# Patient Record
Sex: MALE | Race: WHITE | HISPANIC OR LATINO
[De-identification: names, ages, dates, MRNs, and addresses within clinical notes are randomized per-mention and may not be internally consistent; named-entity substitution may affect disease eponyms.]

---

## 2017-03-10 ENCOUNTER — APPOINTMENT (OUTPATIENT)
Dept: OPHTHALMOLOGY | Facility: CLINIC | Age: 72
End: 2017-03-10

## 2017-03-10 DIAGNOSIS — Z78.9 OTHER SPECIFIED HEALTH STATUS: ICD-10-CM

## 2017-03-10 DIAGNOSIS — H26.493 OTHER SECONDARY CATARACT, BILATERAL: ICD-10-CM

## 2017-11-14 ENCOUNTER — APPOINTMENT (OUTPATIENT)
Dept: UROLOGY | Facility: CLINIC | Age: 72
End: 2017-11-14
Payer: MEDICARE

## 2017-11-14 VITALS
HEART RATE: 88 BPM | SYSTOLIC BLOOD PRESSURE: 147 MMHG | TEMPERATURE: 98.6 F | BODY MASS INDEX: 30.53 KG/M2 | RESPIRATION RATE: 17 BRPM | HEIGHT: 66 IN | WEIGHT: 190 LBS | DIASTOLIC BLOOD PRESSURE: 79 MMHG

## 2017-11-14 PROCEDURE — 99213 OFFICE O/P EST LOW 20 MIN: CPT

## 2017-11-14 PROCEDURE — 51798 US URINE CAPACITY MEASURE: CPT

## 2017-11-14 RX ORDER — CEPHALEXIN 500 MG/1
500 CAPSULE ORAL
Qty: 30 | Refills: 0 | Status: DISCONTINUED | COMMUNITY
Start: 2016-11-15

## 2017-11-14 RX ORDER — AMOXICILLIN 500 MG/1
500 TABLET, FILM COATED ORAL
Qty: 30 | Refills: 0 | Status: DISCONTINUED | COMMUNITY
Start: 2017-07-22

## 2017-11-16 LAB — PSA SERPL-MCNC: 9.05 NG/ML

## 2017-11-20 ENCOUNTER — MESSAGE (OUTPATIENT)
Age: 72
End: 2017-11-20

## 2017-11-21 ENCOUNTER — FORM ENCOUNTER (OUTPATIENT)
Age: 72
End: 2017-11-21

## 2017-11-22 ENCOUNTER — APPOINTMENT (OUTPATIENT)
Dept: MRI IMAGING | Facility: IMAGING CENTER | Age: 72
End: 2017-11-22
Payer: MEDICARE

## 2017-11-22 ENCOUNTER — OUTPATIENT (OUTPATIENT)
Dept: OUTPATIENT SERVICES | Facility: HOSPITAL | Age: 72
LOS: 1 days | End: 2017-11-22
Payer: COMMERCIAL

## 2017-11-22 DIAGNOSIS — R97.20 ELEVATED PROSTATE SPECIFIC ANTIGEN [PSA]: ICD-10-CM

## 2017-11-22 PROCEDURE — 82565 ASSAY OF CREATININE: CPT

## 2017-11-22 PROCEDURE — 72197 MRI PELVIS W/O & W/DYE: CPT

## 2017-11-22 PROCEDURE — A9585: CPT

## 2017-11-22 PROCEDURE — 72197 MRI PELVIS W/O & W/DYE: CPT | Mod: 26

## 2018-11-13 ENCOUNTER — APPOINTMENT (OUTPATIENT)
Dept: UROLOGY | Facility: CLINIC | Age: 73
End: 2018-11-13
Payer: MEDICARE

## 2018-11-13 VITALS
TEMPERATURE: 98.4 F | HEART RATE: 91 BPM | HEIGHT: 66 IN | SYSTOLIC BLOOD PRESSURE: 106 MMHG | BODY MASS INDEX: 30.53 KG/M2 | WEIGHT: 190 LBS | RESPIRATION RATE: 17 BRPM | DIASTOLIC BLOOD PRESSURE: 65 MMHG

## 2018-11-13 PROCEDURE — 99214 OFFICE O/P EST MOD 30 MIN: CPT

## 2018-11-14 LAB — PSA SERPL-MCNC: 10.83 NG/ML

## 2018-12-10 ENCOUNTER — FORM ENCOUNTER (OUTPATIENT)
Age: 73
End: 2018-12-10

## 2018-12-11 ENCOUNTER — OUTPATIENT (OUTPATIENT)
Dept: OUTPATIENT SERVICES | Facility: HOSPITAL | Age: 73
LOS: 1 days | End: 2018-12-11
Payer: COMMERCIAL

## 2018-12-11 ENCOUNTER — APPOINTMENT (OUTPATIENT)
Dept: UROLOGY | Facility: CLINIC | Age: 73
End: 2018-12-11
Payer: MEDICARE

## 2018-12-11 ENCOUNTER — APPOINTMENT (OUTPATIENT)
Dept: ULTRASOUND IMAGING | Facility: IMAGING CENTER | Age: 73
End: 2018-12-11
Payer: MEDICARE

## 2018-12-11 DIAGNOSIS — Z87.442 PERSONAL HISTORY OF URINARY CALCULI: ICD-10-CM

## 2018-12-11 DIAGNOSIS — Z00.8 ENCOUNTER FOR OTHER GENERAL EXAMINATION: ICD-10-CM

## 2018-12-11 PROCEDURE — 76770 US EXAM ABDO BACK WALL COMP: CPT | Mod: 26

## 2018-12-11 PROCEDURE — 99212 OFFICE O/P EST SF 10 MIN: CPT

## 2018-12-11 PROCEDURE — 76770 US EXAM ABDO BACK WALL COMP: CPT

## 2018-12-13 LAB — BACTERIA UR CULT: NORMAL

## 2019-01-08 ENCOUNTER — MEDICATION RENEWAL (OUTPATIENT)
Age: 74
End: 2019-01-08

## 2019-01-14 ENCOUNTER — APPOINTMENT (OUTPATIENT)
Dept: UROLOGY | Facility: HOSPITAL | Age: 74
End: 2019-01-14

## 2019-02-01 ENCOUNTER — OUTPATIENT (OUTPATIENT)
Dept: OUTPATIENT SERVICES | Facility: HOSPITAL | Age: 74
LOS: 1 days | End: 2019-02-01
Payer: COMMERCIAL

## 2019-02-01 ENCOUNTER — APPOINTMENT (OUTPATIENT)
Dept: MRI IMAGING | Facility: IMAGING CENTER | Age: 74
End: 2019-02-01
Payer: MEDICARE

## 2019-02-01 DIAGNOSIS — Z00.8 ENCOUNTER FOR OTHER GENERAL EXAMINATION: ICD-10-CM

## 2019-02-01 PROCEDURE — 72197 MRI PELVIS W/O & W/DYE: CPT | Mod: 26

## 2019-02-01 PROCEDURE — A9585: CPT

## 2019-02-01 PROCEDURE — 72197 MRI PELVIS W/O & W/DYE: CPT

## 2019-08-11 ENCOUNTER — EMERGENCY (EMERGENCY)
Facility: HOSPITAL | Age: 74
LOS: 1 days | Discharge: HOME | End: 2019-08-11
Attending: EMERGENCY MEDICINE
Payer: COMMERCIAL

## 2019-08-11 VITALS
DIASTOLIC BLOOD PRESSURE: 89 MMHG | OXYGEN SATURATION: 97 % | HEART RATE: 89 BPM | RESPIRATION RATE: 16 BRPM | SYSTOLIC BLOOD PRESSURE: 147 MMHG

## 2019-08-11 VITALS
RESPIRATION RATE: 17 BRPM | HEART RATE: 93 BPM | OXYGEN SATURATION: 97 % | SYSTOLIC BLOOD PRESSURE: 144 MMHG | TEMPERATURE: 98 F | DIASTOLIC BLOOD PRESSURE: 92 MMHG

## 2019-08-11 LAB
ALBUMIN SERPL ELPH-MCNC: 4 G/DL — SIGNIFICANT CHANGE UP (ref 3.3–5)
ALP SERPL-CCNC: 69 U/L — SIGNIFICANT CHANGE UP (ref 40–120)
ALT FLD-CCNC: 47 U/L — HIGH (ref 10–45)
ANION GAP SERPL CALC-SCNC: 11 MMOL/L — SIGNIFICANT CHANGE UP (ref 5–17)
APTT BLD: 30.9 SEC — SIGNIFICANT CHANGE UP (ref 27.5–36.3)
AST SERPL-CCNC: 42 U/L — HIGH (ref 10–40)
BASOPHILS # BLD AUTO: 0 K/UL — SIGNIFICANT CHANGE UP (ref 0–0.2)
BASOPHILS NFR BLD AUTO: 0.2 % — SIGNIFICANT CHANGE UP (ref 0–2)
BILIRUB SERPL-MCNC: 0.3 MG/DL — SIGNIFICANT CHANGE UP (ref 0.2–1.2)
BUN SERPL-MCNC: 14 MG/DL — SIGNIFICANT CHANGE UP (ref 7–23)
CALCIUM SERPL-MCNC: 9.1 MG/DL — SIGNIFICANT CHANGE UP (ref 8.4–10.5)
CHLORIDE SERPL-SCNC: 106 MMOL/L — SIGNIFICANT CHANGE UP (ref 96–108)
CO2 SERPL-SCNC: 23 MMOL/L — SIGNIFICANT CHANGE UP (ref 22–31)
CREAT SERPL-MCNC: 0.87 MG/DL — SIGNIFICANT CHANGE UP (ref 0.5–1.3)
EOSINOPHIL # BLD AUTO: 0.4 K/UL — SIGNIFICANT CHANGE UP (ref 0–0.5)
EOSINOPHIL NFR BLD AUTO: 6.2 % — HIGH (ref 0–6)
GLUCOSE SERPL-MCNC: 127 MG/DL — HIGH (ref 70–99)
HCT VFR BLD CALC: 46.9 % — SIGNIFICANT CHANGE UP (ref 39–50)
HGB BLD-MCNC: 14.7 G/DL — SIGNIFICANT CHANGE UP (ref 13–17)
INR BLD: 0.95 RATIO — SIGNIFICANT CHANGE UP (ref 0.88–1.16)
LYMPHOCYTES # BLD AUTO: 1.6 K/UL — SIGNIFICANT CHANGE UP (ref 1–3.3)
LYMPHOCYTES # BLD AUTO: 25.2 % — SIGNIFICANT CHANGE UP (ref 13–44)
MCHC RBC-ENTMCNC: 27.1 PG — SIGNIFICANT CHANGE UP (ref 27–34)
MCHC RBC-ENTMCNC: 31.4 GM/DL — LOW (ref 32–36)
MCV RBC AUTO: 86.4 FL — SIGNIFICANT CHANGE UP (ref 80–100)
MONOCYTES # BLD AUTO: 0.7 K/UL — SIGNIFICANT CHANGE UP (ref 0–0.9)
MONOCYTES NFR BLD AUTO: 10.4 % — SIGNIFICANT CHANGE UP (ref 2–14)
NEUTROPHILS # BLD AUTO: 3.7 K/UL — SIGNIFICANT CHANGE UP (ref 1.8–7.4)
NEUTROPHILS NFR BLD AUTO: 58 % — SIGNIFICANT CHANGE UP (ref 43–77)
PLATELET # BLD AUTO: 297 K/UL — SIGNIFICANT CHANGE UP (ref 150–400)
POTASSIUM SERPL-MCNC: 3.9 MMOL/L — SIGNIFICANT CHANGE UP (ref 3.5–5.3)
POTASSIUM SERPL-SCNC: 3.9 MMOL/L — SIGNIFICANT CHANGE UP (ref 3.5–5.3)
PROT SERPL-MCNC: 7 G/DL — SIGNIFICANT CHANGE UP (ref 6–8.3)
PROTHROM AB SERPL-ACNC: 10.9 SEC — SIGNIFICANT CHANGE UP (ref 10–12.9)
RBC # BLD: 5.43 M/UL — SIGNIFICANT CHANGE UP (ref 4.2–5.8)
RBC # FLD: 13 % — SIGNIFICANT CHANGE UP (ref 10.3–14.5)
SODIUM SERPL-SCNC: 140 MMOL/L — SIGNIFICANT CHANGE UP (ref 135–145)
WBC # BLD: 6.4 K/UL — SIGNIFICANT CHANGE UP (ref 3.8–10.5)
WBC # FLD AUTO: 6.4 K/UL — SIGNIFICANT CHANGE UP (ref 3.8–10.5)

## 2019-08-11 PROCEDURE — 70498 CT ANGIOGRAPHY NECK: CPT | Mod: 26

## 2019-08-11 PROCEDURE — 93005 ELECTROCARDIOGRAM TRACING: CPT

## 2019-08-11 PROCEDURE — 82962 GLUCOSE BLOOD TEST: CPT

## 2019-08-11 PROCEDURE — 70496 CT ANGIOGRAPHY HEAD: CPT

## 2019-08-11 PROCEDURE — 85027 COMPLETE CBC AUTOMATED: CPT

## 2019-08-11 PROCEDURE — 70498 CT ANGIOGRAPHY NECK: CPT

## 2019-08-11 PROCEDURE — 80053 COMPREHEN METABOLIC PANEL: CPT

## 2019-08-11 PROCEDURE — 70496 CT ANGIOGRAPHY HEAD: CPT | Mod: 26

## 2019-08-11 PROCEDURE — 99291 CRITICAL CARE FIRST HOUR: CPT

## 2019-08-11 PROCEDURE — 70450 CT HEAD/BRAIN W/O DYE: CPT | Mod: 26,59

## 2019-08-11 PROCEDURE — 85730 THROMBOPLASTIN TIME PARTIAL: CPT

## 2019-08-11 PROCEDURE — 70450 CT HEAD/BRAIN W/O DYE: CPT

## 2019-08-11 PROCEDURE — 93010 ELECTROCARDIOGRAM REPORT: CPT

## 2019-08-11 PROCEDURE — 85610 PROTHROMBIN TIME: CPT

## 2019-08-11 PROCEDURE — 99291 CRITICAL CARE FIRST HOUR: CPT | Mod: 25

## 2019-08-11 RX ORDER — ASPIRIN/CALCIUM CARB/MAGNESIUM 324 MG
81 TABLET ORAL DAILY
Refills: 0 | Status: DISCONTINUED | OUTPATIENT
Start: 2019-08-11 | End: 2019-08-15

## 2019-08-11 NOTE — ED ADULT TRIAGE NOTE - CHIEF COMPLAINT QUOTE
Patient c/o left sided numbness since 3 am, denies weakness, accompanied with nausea. Wife gave him 2 baby aspirin. No medical history, no medications, only vitamins.

## 2019-08-11 NOTE — ED ADULT NURSE NOTE - OBJECTIVE STATEMENT
74 year old male presents to the ED via walk in with c/o numbness. Pt awoke at 3am to use the restroom and felt pins and needles in left leg, left cheek, and left arm. Last known normal 12am when he went to bed. 74 year old male presents to the ED via walk in with c/o numbness. Pt awoke at 3am to use the restroom and felt pins and needles in left leg, left cheek, and left arm. Last known normal 12am when he went to bed. Code stroke called at 0437. Pt ambulated with steady gait with no assist. Equal sensation on all extremities. Normal speech, symmetrical face. Pt endorses all feelings have resolved at this time other than tingling in left fingers. Pt has no other PMH aside from prostate. Denies c/o pain or discomfort at this time. Comfort and safety measures maintained.

## 2019-08-11 NOTE — ED PROVIDER NOTE - PHYSICAL EXAMINATION
Physical Exam:  Gen: NAD, AOx3, non-toxic appearing, able to ambulate without assistance  Head: NCAT  HEENT: EOMI, PEERLA, normal conjunctiva, tongue midline, oral mucosa moist  Lung: CTAB, no respiratory distress, no wheezes/rhonchi/rales B/L, speaking in full sentences  CV: RRR, no murmurs, rubs or gallops  Abd: soft, NT, ND, no guarding, no rigidity, no rebound tenderness, no CVA tenderness   MSK: no visible deformities, ROM normal in UE/LE, no back pain  Neuro: No focal sensory or motor deficits, subjective numbness in left perioral area, left fingertips, left outerthigh  Skin: Warm, well perfused, no rash, no leg swelling  Psych: normal affect, calm  ~Curly Catherine D.O. -Resident

## 2019-08-11 NOTE — ED PROVIDER NOTE - ATTENDING CONTRIBUTION TO CARE
74M, pmh bph, presents with subjective numbness to L face, arms, torso, leg, onset ~1.5 hours PTA. pt states he awoke to urinate when noted sx. last noted normal ~4.5 hours pta when went to sleep. denies weakness, vision changes, speech changes, confusion, headache, dizziness. Denies cp, sob. Pt's sx improving with time, only with numbness noted to fingers L hand and region of L face. Took 2 81 mg asa pta. Denies recnet illnesses, f/c, n/v/d.    PE: NAD, NCAT, MMM, Trachea midline, Normal conjunctiva, lungs CTAB, S1/S2 RRR, Normal perfusion, 2+ radial pulses bilat, Abdomen Soft, NTND, No rebound/guarding, No LE edema, No deformity of extremities, No rashes,  No focal motor or sensory deficits. CN II-XII intact. Visual fields intact. EOMI, PERRLA. Facial sensation equal bilat. Smile and eye closure equal bilat. Hearing intact bilat. Palate elevation equal, tongue protrusion midline. Lateral head rotation equal bilat. 5/5 strength UE and LE bilat. Sensation grossly intact. No pronator drift. Normal finger to nose. Negative Romberg. Normal gait.     Due to acute onset of sx, possible cva distribution, code stroke called on arrival. Neuro to bedside in minutes. Stat CT/CTA ordered, along with labs. To re-eval, appreciate neuro rec's. - Clay Nowak MD

## 2019-08-11 NOTE — ED PROVIDER NOTE - NSFOLLOWUPCLINICS_GEN_ALL_ED_FT
A Neurologist  Neurology  .  [place]   Phone:   Fax:   Follow Up Time: 4-6 Days    [facility]  Neurology  [address] [place] [id number]  Phone: [phone]  Fax:   Follow Up Time: 4-6 Days A Neurologist  Neurology  .  [place]   Phone:   Fax:   Follow Up Time: 4-6 Days

## 2019-08-11 NOTE — ED PROVIDER NOTE - CARE PROVIDER_API CALL
Lb Mendez (DO)  Neurology; Vascular Neurology  [address] [facility] [address]  [place] [address]  Phone: [phone]  Fax: [phone]  Follow Up Time: 1-3 Days

## 2019-08-11 NOTE — ED PROVIDER NOTE - CLINICAL SUMMARY MEDICAL DECISION MAKING FREE TEXT BOX
74 year old male with past medical history of BPH presenting for 1.5 hours of left sided numbness. Patient reports improvement of symptoms in ED compared to onset. Strength in all extremities and face is symmetric bilaterally on exam, but continues to report subjective numbness on his face and left fingertips. Code stroke activated. 74 year old male with past medical history of BPH presenting for 1.5 hours of left sided numbness. Patient reports improvement of symptoms in ED compared to onset. Strength in all extremities and face is symmetric bilaterally on exam, but continues to report subjective numbness on his face and left fingertips. Code stroke called, will get cthead, cta, labs, r/o metabolic causes of numbness, reassess neruo cnslt

## 2019-08-11 NOTE — ED PROVIDER NOTE - OBJECTIVE STATEMENT
74 year old male with past medical history of BPH presenting after waking up 1.5 hours ago with subjective numbness of his entire left side, including face, arms, torso and leg, without any loss in strength. He reports that he woke up to urinate when he noticed the symptoms. He reports the last time he was normal was 4.5 hours prior to arrival to ED when he went to sleep. He is currently denying any pain, nausea or vomiting. At the time of exam, he is reporting improvement of his symptoms, with only numbness in the fingers of his left hand and left sided face. Prior to arrival, he reports he took 2 baby aspirins. 74 year old male with past medical history of BPH presenting after waking up 1.5 hours ago with subjective numbness of his entire left side, including face, arms, torso and leg, without any loss in strength. He reports that he woke up to urinate when he noticed the symptoms. He reports the last time he was normal was 4.5 hours prior to arrival to ED when he went to sleep. He is currently denying any pain, nausea or vomiting. At the time of exam, he is reporting improvement of his symptoms, with only numbness in the fingers of his left hand and left sided face. Prior to arrival, he reports he took 2 baby aspirin. Denies recent trauma, fevers, chills, headache, dizziness, nausea, vomiting, dysuria, freq, hematuria, diarrhea, constipation, chest pain, shortness of breath, cough.

## 2019-08-11 NOTE — ED PROVIDER NOTE - NSFOLLOWUPINSTRUCTIONS_ED_ALL_ED_FT
1. TAKE BABY ASPIRIN DAILY   2. FOR PAIN OR FEVER YOU CAN TAKE IBUPROFEN (MOTRIN, ADVIL) OR ACETAMINOPHEN (TYLENOL) AS NEEDED, AS DIRECTED ON PACKAGING.  3. FOLLOW UP WITH YOUR NEUROLOGIST WITHIN 5 DAYS AS DIRECTED.  4. IF YOU HAD LABS OR IMAGING DONE, YOU WERE GIVEN COPIES OF ALL LABS AND/OR IMAGING RESULTS FROM YOUR ER VISIT--PLEASE TAKE THEM WITH YOU TO YOUR FOLLOW UP APPOINTMENTS.  5. IF NEEDED, CALL PATIENT ACCESS SERVICES AT [phone] TO FIND A PRIMARY CARE PHYSICIAN.  OR CALL [phone] TO MAKE AN APPOINTMENT WITH THE CLINIC.  6. RETURN TO THE ER FOR ANY WORSENING SYMPTOMS OR CONCERNS.

## 2019-08-11 NOTE — ED PROVIDER NOTE - NS ED ATTENDING STATEMENT MOD
Pt c/o near syncopal episode tonight, cough and congestion x 1 week I have personally seen and examined this patient.  I have fully participated in the care of this patient. I have reviewed all pertinent clinical information, including history, physical exam, plan and the Resident’s note and agree except as noted.

## 2019-08-11 NOTE — ED PROVIDER NOTE - PROGRESS NOTE DETAILS
patient re-evaluated and doing well.  No acute issues at  this time. Updated patient with results thus far and pending neuro reccs Pt re-evaluated. Asymptomatic. Pt evaluated by neuro resident, who discussed case with stroke fellow. They recommend d/c with 81 asa and outpatient neuro f/u. Pt updated on this. To discharge. - Clay Nowak MD Patient feels well, tolerating PO at baseline ms, asymptomatic at present. Discussed lab and radiology findings with patient. Patient feels comfortable going home. Gave home care and follow up instructions. Discussed which symptoms to look out for and when to return to the ED for further evaluation. Patient given opportunity to ask questions about their medical condition and had all questions answered.

## 2019-08-11 NOTE — STROKE CODE NOTE - NIH STROKE SCALE: 9. BEST LANGUAGE, QM
(0) No aphasia; normal normal... Well appearing, well nourished, awake, alert, oriented to person, place, time/situation and in no apparent distress.

## 2020-03-03 NOTE — ED ADULT NURSE NOTE - CHIEF COMPLAINT
Pending prescription.  Does he have a local pharmacy to send this to?   The patient is a 74y Male complaining of numbness.

## 2021-01-21 ENCOUNTER — APPOINTMENT (OUTPATIENT)
Dept: OPHTHALMOLOGY | Facility: CLINIC | Age: 76
End: 2021-01-21

## 2021-09-04 ENCOUNTER — TRANSCRIPTION ENCOUNTER (OUTPATIENT)
Age: 76
End: 2021-09-04

## 2021-09-05 ENCOUNTER — INPATIENT (INPATIENT)
Facility: HOSPITAL | Age: 76
LOS: 2 days | Discharge: HOME | DRG: 854 | End: 2021-09-08
Attending: UROLOGY | Admitting: UROLOGY
Payer: COMMERCIAL

## 2021-09-05 VITALS
WEIGHT: 184.97 LBS | RESPIRATION RATE: 18 BRPM | HEART RATE: 123 BPM | HEIGHT: 66 IN | SYSTOLIC BLOOD PRESSURE: 113 MMHG | TEMPERATURE: 100 F | OXYGEN SATURATION: 92 % | DIASTOLIC BLOOD PRESSURE: 65 MMHG

## 2021-09-05 DIAGNOSIS — A41.9 SEPSIS, UNSPECIFIED ORGANISM: ICD-10-CM

## 2021-09-05 LAB
ALBUMIN SERPL ELPH-MCNC: 3.7 G/DL — SIGNIFICANT CHANGE UP (ref 3.3–5)
ALP SERPL-CCNC: 102 U/L — SIGNIFICANT CHANGE UP (ref 40–120)
ALT FLD-CCNC: 55 U/L — HIGH (ref 10–45)
ANION GAP SERPL CALC-SCNC: 16 MMOL/L — SIGNIFICANT CHANGE UP (ref 5–17)
APPEARANCE UR: ABNORMAL
APTT BLD: 28.7 SEC — SIGNIFICANT CHANGE UP (ref 27.5–35.5)
AST SERPL-CCNC: 44 U/L — HIGH (ref 10–40)
BACTERIA # UR AUTO: ABNORMAL
BASE EXCESS BLDV CALC-SCNC: 0.7 MMOL/L — SIGNIFICANT CHANGE UP (ref -2–2)
BASOPHILS # BLD AUTO: 0 K/UL — SIGNIFICANT CHANGE UP (ref 0–0.2)
BASOPHILS NFR BLD AUTO: 0 % — SIGNIFICANT CHANGE UP (ref 0–2)
BILIRUB SERPL-MCNC: 1.3 MG/DL — HIGH (ref 0.2–1.2)
BILIRUB UR-MCNC: NEGATIVE — SIGNIFICANT CHANGE UP
BLD GP AB SCN SERPL QL: NEGATIVE — SIGNIFICANT CHANGE UP
BUN SERPL-MCNC: 19 MG/DL — SIGNIFICANT CHANGE UP (ref 7–23)
CA-I SERPL-SCNC: 1.23 MMOL/L — SIGNIFICANT CHANGE UP (ref 1.15–1.33)
CALCIUM SERPL-MCNC: 9.9 MG/DL — SIGNIFICANT CHANGE UP (ref 8.4–10.5)
CHLORIDE BLDV-SCNC: 102 MMOL/L — SIGNIFICANT CHANGE UP (ref 96–108)
CHLORIDE SERPL-SCNC: 102 MMOL/L — SIGNIFICANT CHANGE UP (ref 96–108)
CO2 BLDV-SCNC: 27 MMOL/L — HIGH (ref 22–26)
CO2 SERPL-SCNC: 19 MMOL/L — LOW (ref 22–31)
COLOR SPEC: YELLOW — SIGNIFICANT CHANGE UP
CREAT SERPL-MCNC: 1.05 MG/DL — SIGNIFICANT CHANGE UP (ref 0.5–1.3)
DIFF PNL FLD: ABNORMAL
EOSINOPHIL # BLD AUTO: 0 K/UL — SIGNIFICANT CHANGE UP (ref 0–0.5)
EOSINOPHIL NFR BLD AUTO: 0 % — SIGNIFICANT CHANGE UP (ref 0–6)
EPI CELLS # UR: 2 /HPF — SIGNIFICANT CHANGE UP
GAS PNL BLDV: 133 MMOL/L — LOW (ref 136–145)
GAS PNL BLDV: SIGNIFICANT CHANGE UP
GAS PNL BLDV: SIGNIFICANT CHANGE UP
GLUCOSE BLDV-MCNC: 163 MG/DL — HIGH (ref 70–99)
GLUCOSE SERPL-MCNC: 154 MG/DL — HIGH (ref 70–99)
GLUCOSE UR QL: NEGATIVE — SIGNIFICANT CHANGE UP
HCO3 BLDV-SCNC: 25 MMOL/L — SIGNIFICANT CHANGE UP (ref 22–29)
HCT VFR BLD CALC: 41.4 % — SIGNIFICANT CHANGE UP (ref 39–50)
HCT VFR BLDA CALC: 43 % — SIGNIFICANT CHANGE UP (ref 39–51)
HGB BLD CALC-MCNC: 14.3 G/DL — SIGNIFICANT CHANGE UP (ref 12.6–17.4)
HGB BLD-MCNC: 13.3 G/DL — SIGNIFICANT CHANGE UP (ref 13–17)
HYALINE CASTS # UR AUTO: 5 /LPF — HIGH (ref 0–2)
INR BLD: 1.25 RATIO — HIGH (ref 0.88–1.16)
KETONES UR-MCNC: NEGATIVE — SIGNIFICANT CHANGE UP
LACTATE BLDV-MCNC: 1.7 MMOL/L — SIGNIFICANT CHANGE UP (ref 0.7–2)
LEUKOCYTE ESTERASE UR-ACNC: ABNORMAL
LYMPHOCYTES # BLD AUTO: 0.16 K/UL — LOW (ref 1–3.3)
LYMPHOCYTES # BLD AUTO: 0.9 % — LOW (ref 13–44)
MANUAL SMEAR VERIFICATION: SIGNIFICANT CHANGE UP
MCHC RBC-ENTMCNC: 27.4 PG — SIGNIFICANT CHANGE UP (ref 27–34)
MCHC RBC-ENTMCNC: 32.1 GM/DL — SIGNIFICANT CHANGE UP (ref 32–36)
MCV RBC AUTO: 85.4 FL — SIGNIFICANT CHANGE UP (ref 80–100)
MONOCYTES # BLD AUTO: 0.77 K/UL — SIGNIFICANT CHANGE UP (ref 0–0.9)
MONOCYTES NFR BLD AUTO: 4.3 % — SIGNIFICANT CHANGE UP (ref 2–14)
NEUTROPHILS # BLD AUTO: 16.93 K/UL — HIGH (ref 1.8–7.4)
NEUTROPHILS NFR BLD AUTO: 89.6 % — HIGH (ref 43–77)
NEUTS BAND # BLD: 5.2 % — SIGNIFICANT CHANGE UP (ref 0–8)
NITRITE UR-MCNC: NEGATIVE — SIGNIFICANT CHANGE UP
PCO2 BLDV: 40 MMHG — LOW (ref 42–55)
PH BLDV: 7.41 — SIGNIFICANT CHANGE UP (ref 7.32–7.43)
PH UR: 6 — SIGNIFICANT CHANGE UP (ref 5–8)
PLAT MORPH BLD: NORMAL — SIGNIFICANT CHANGE UP
PLATELET # BLD AUTO: 242 K/UL — SIGNIFICANT CHANGE UP (ref 150–400)
PO2 BLDV: 33 MMHG — SIGNIFICANT CHANGE UP (ref 25–45)
POTASSIUM BLDV-SCNC: 3.8 MMOL/L — SIGNIFICANT CHANGE UP (ref 3.5–5.1)
POTASSIUM SERPL-MCNC: 3.6 MMOL/L — SIGNIFICANT CHANGE UP (ref 3.5–5.3)
POTASSIUM SERPL-SCNC: 3.6 MMOL/L — SIGNIFICANT CHANGE UP (ref 3.5–5.3)
PROT SERPL-MCNC: 7.3 G/DL — SIGNIFICANT CHANGE UP (ref 6–8.3)
PROT UR-MCNC: ABNORMAL
PROTHROM AB SERPL-ACNC: 14.8 SEC — HIGH (ref 10.6–13.6)
RBC # BLD: 4.85 M/UL — SIGNIFICANT CHANGE UP (ref 4.2–5.8)
RBC # FLD: 14.3 % — SIGNIFICANT CHANGE UP (ref 10.3–14.5)
RBC BLD AUTO: SIGNIFICANT CHANGE UP
RBC CASTS # UR COMP ASSIST: 4 /HPF — SIGNIFICANT CHANGE UP (ref 0–4)
RH IG SCN BLD-IMP: POSITIVE — SIGNIFICANT CHANGE UP
RH IG SCN BLD-IMP: POSITIVE — SIGNIFICANT CHANGE UP
SAO2 % BLDV: 57.8 % — LOW (ref 67–88)
SARS-COV-2 RNA SPEC QL NAA+PROBE: SIGNIFICANT CHANGE UP
SODIUM SERPL-SCNC: 137 MMOL/L — SIGNIFICANT CHANGE UP (ref 135–145)
SP GR SPEC: 1.03 — HIGH (ref 1.01–1.02)
UROBILINOGEN FLD QL: ABNORMAL
WBC # BLD: 17.86 K/UL — HIGH (ref 3.8–10.5)
WBC # FLD AUTO: 17.86 K/UL — HIGH (ref 3.8–10.5)
WBC UR QL: 460 /HPF — HIGH (ref 0–5)

## 2021-09-05 PROCEDURE — 93010 ELECTROCARDIOGRAM REPORT: CPT

## 2021-09-05 PROCEDURE — 71045 X-RAY EXAM CHEST 1 VIEW: CPT | Mod: 26

## 2021-09-05 PROCEDURE — 99285 EMERGENCY DEPT VISIT HI MDM: CPT

## 2021-09-05 PROCEDURE — 52332 CYSTOSCOPY AND TREATMENT: CPT | Mod: RT,GC

## 2021-09-05 PROCEDURE — 74177 CT ABD & PELVIS W/CONTRAST: CPT | Mod: 26,MA

## 2021-09-05 RX ORDER — SENNA PLUS 8.6 MG/1
1 TABLET ORAL AT BEDTIME
Refills: 0 | Status: DISCONTINUED | OUTPATIENT
Start: 2021-09-05 | End: 2021-09-05

## 2021-09-05 RX ORDER — ACETAMINOPHEN 500 MG
650 TABLET ORAL EVERY 6 HOURS
Refills: 0 | Status: DISCONTINUED | OUTPATIENT
Start: 2021-09-05 | End: 2021-09-05

## 2021-09-05 RX ORDER — SODIUM CHLORIDE 9 MG/ML
1000 INJECTION, SOLUTION INTRAVENOUS
Refills: 0 | Status: DISCONTINUED | OUTPATIENT
Start: 2021-09-05 | End: 2021-09-05

## 2021-09-05 RX ORDER — ONDANSETRON 8 MG/1
4 TABLET, FILM COATED ORAL EVERY 6 HOURS
Refills: 0 | Status: DISCONTINUED | OUTPATIENT
Start: 2021-09-05 | End: 2021-09-06

## 2021-09-05 RX ORDER — OXYCODONE AND ACETAMINOPHEN 5; 325 MG/1; MG/1
1 TABLET ORAL EVERY 4 HOURS
Refills: 0 | Status: DISCONTINUED | OUTPATIENT
Start: 2021-09-05 | End: 2021-09-05

## 2021-09-05 RX ORDER — ONDANSETRON 8 MG/1
4 TABLET, FILM COATED ORAL ONCE
Refills: 0 | Status: COMPLETED | OUTPATIENT
Start: 2021-09-05 | End: 2021-09-05

## 2021-09-05 RX ORDER — ACETAMINOPHEN 500 MG
650 TABLET ORAL ONCE
Refills: 0 | Status: COMPLETED | OUTPATIENT
Start: 2021-09-05 | End: 2021-09-05

## 2021-09-05 RX ORDER — PIPERACILLIN AND TAZOBACTAM 4; .5 G/20ML; G/20ML
3.38 INJECTION, POWDER, LYOPHILIZED, FOR SOLUTION INTRAVENOUS ONCE
Refills: 0 | Status: COMPLETED | OUTPATIENT
Start: 2021-09-05 | End: 2021-09-05

## 2021-09-05 RX ORDER — HEPARIN SODIUM 5000 [USP'U]/ML
5000 INJECTION INTRAVENOUS; SUBCUTANEOUS EVERY 8 HOURS
Refills: 0 | Status: DISCONTINUED | OUTPATIENT
Start: 2021-09-05 | End: 2021-09-05

## 2021-09-05 RX ORDER — SODIUM CHLORIDE 9 MG/ML
1000 INJECTION, SOLUTION INTRAVENOUS ONCE
Refills: 0 | Status: COMPLETED | OUTPATIENT
Start: 2021-09-05 | End: 2021-09-05

## 2021-09-05 RX ORDER — ONDANSETRON 8 MG/1
4 TABLET, FILM COATED ORAL EVERY 6 HOURS
Refills: 0 | Status: DISCONTINUED | OUTPATIENT
Start: 2021-09-05 | End: 2021-09-05

## 2021-09-05 RX ORDER — HYDROMORPHONE HYDROCHLORIDE 2 MG/ML
0.25 INJECTION INTRAMUSCULAR; INTRAVENOUS; SUBCUTANEOUS
Refills: 0 | Status: DISCONTINUED | OUTPATIENT
Start: 2021-09-05 | End: 2021-09-06

## 2021-09-05 RX ADMIN — PIPERACILLIN AND TAZOBACTAM 200 GRAM(S): 4; .5 INJECTION, POWDER, LYOPHILIZED, FOR SOLUTION INTRAVENOUS at 18:57

## 2021-09-05 RX ADMIN — ONDANSETRON 4 MILLIGRAM(S): 8 TABLET, FILM COATED ORAL at 18:42

## 2021-09-05 RX ADMIN — Medication 650 MILLIGRAM(S): at 18:42

## 2021-09-05 RX ADMIN — SODIUM CHLORIDE 1000 MILLILITER(S): 9 INJECTION, SOLUTION INTRAVENOUS at 21:15

## 2021-09-05 RX ADMIN — SODIUM CHLORIDE 1000 MILLILITER(S): 9 INJECTION, SOLUTION INTRAVENOUS at 18:17

## 2021-09-05 NOTE — ED PROVIDER NOTE - ATTENDING CONTRIBUTION TO CARE
Attending Karolyn: I performed a history and physical exam of the patient and discussed their management with the resident/fellow/ACP/student. I have reviewed the resident/fellow/ACP/student note and agree with the documented findings and plan of care, except as noted. My medical decision making and observations are found above. Please refer to any progress notes for updates on clinical course.

## 2021-09-05 NOTE — H&P ADULT - ASSESSMENT
76y M with PMHx of BPH, nephrolithiasis spontaneously passed 20 years ago, BIBEMS from home with AMS, fevers, right flank pain. In ED, patient with a temp of 100.8, tachycardic to 120's, WC 17, Cr 1.05, UA with large leuks, 460 WBC's, bacteria, CT scan reveals mild R hydro with a 7mm distal ureteral stone. Admitted for emergent Right ureteral stent placement.     - Admit to Dr. Baird  - Consented for OR, Right ureteral stent placement   - Diet -NPO  - IVF  - Analgesia and antiemetics PRN  - ABX  - follow up urine and blood cultures  - Monitor vitals  - AM labs  - Strict I &Os  - home meds added  - DVT ppx  - Incentive Spirometry    Discussed with Dr. Baird

## 2021-09-05 NOTE — H&P ADULT - NSHPLABSRESULTS_GEN_ALL_CORE
Labs:  09-05  17.86 / 41.4  /x      09-05  x     / x     /1.05                           13.3   17.86 )-----------( 242      ( 05 Sep 2021 18:18 )             41.4     09-05    137  |  102  |  19  ----------------------------<  154<H>  3.6   |  19<L>  |  1.05    Ca    9.9      05 Sep 2021 18:17    TPro  7.3  /  Alb  3.7  /  TBili  1.3<H>  /  DBili  x   /  AST  44<H>  /  ALT  55<H>  /  AlkPhos  102  09-05    PT/INR - ( 05 Sep 2021 19:50 )   PT: 14.8 sec;   INR: 1.25 ratio         PTT - ( 05 Sep 2021 19:50 )  PTT:28.7 sec    Urine Cx: pending   Blood Cx: pending     < from: CT Abdomen and Pelvis w/ IV Cont (09.05.21 @ 19:29) >      EXAM:  CT ABDOMEN AND PELVIS IC                            PROCEDURE DATE:  09/05/2021            INTERPRETATION:  CLINICAL INFORMATION: Distended abdomen.    COMPARISON: None.    CONTRAST/COMPLICATIONS:  IV Contrast: 90 cc of Omnipaque 350. 10 cc discarded.  Oral Contrast: None.  Complications: No adverse reactions at the time of the exam.    PROCEDURE:  CT of the Abdomen and Pelvis was performed.  Sagittal and coronal reformats were performed.    FINDINGS:  LOWER CHEST: Calcified granuloma inthe left lower lobe. Mild bibasilar dependent atelectasis. Calcified mediastinal and left hilar lymph nodes.    LIVER: Within normal limits.  BILE DUCTS: Normal caliber.  GALLBLADDER: Within normal limits.  SPLEEN: Within normal limits.  PANCREAS: Within normal limits.  ADRENALS: Within normal limits.  KIDNEYS/URETERS: Mild right-sided hydroureteronephrosis in comparison to the left with urothelial enhancement and mild asymmetric periureteral and perinephric stranding secondary to a 7 mm calculusin the distal right ureter. Additional nonobstructing bilateral intrarenal calculi. Bilateral renal cysts and subcentimeter low-attenuation lesions that are too small to characterize.    BLADDER: Underdistended. Bladder calculus measuring up to 2.4 cm.  REPRODUCTIVE ORGANS: Prostate gland is enlarged.    BOWEL: No bowel obstruction or overt bowel wall thickening. Appendix is normal. Colonic diverticulosis without evidence of diverticulitis.  PERITONEUM: No ascites, pneumoperitoneum, or loculated collection. No mesenteric lymphadenopathy.  VESSELS: Atherosclerotic calcifications of the aortoiliac tree. Normal caliber abdominal aorta.  RETROPERITONEUM/LYMPH NODES: No lymphadenopathy.  ABDOMINAL WALL: Within normal limits.  BONES: Degenerative changes of the spine.    IMPRESSION:  No bowel obstruction or evidence of bowel inflammation.    Mild right-sided hydroureteronephrosis and asymmetric perinephric/periureteral fat stranding with urothelial enhancement secondary to a 7 mm calculus in the distal right ureter. Additional nonobstructing bilateral intrarenal calculi.    --- End of Report ---    < end of copied text >

## 2021-09-05 NOTE — ED PROVIDER NOTE - PROGRESS NOTE DETAILS
Beata, PGY3: pt with UTI, obstructing 7mm kidney stone  urology consulted they will see pt Attending Karolyn: seen by uro. Plan to take urgently to OR for stent placement.

## 2021-09-05 NOTE — BRIEF OPERATIVE NOTE - NSICDXBRIEFPOSTOP_GEN_ALL_CORE_FT
POST-OP DIAGNOSIS:  Sepsis 06-Sep-2021 00:00:43  Natalie Hutchins  Right ureteral stone 06-Sep-2021 00:00:42  Natalie Hutchins

## 2021-09-05 NOTE — BRIEF OPERATIVE NOTE - NSICDXBRIEFPREOP_GEN_ALL_CORE_FT
PRE-OP DIAGNOSIS:  Right ureteral stone 06-Sep-2021 00:00:30  Natalie Hutchins  Sepsis 06-Sep-2021 00:00:38  Natalie Hutchins

## 2021-09-05 NOTE — ED PROVIDER NOTE - OBJECTIVE STATEMENT
76y M with PMHx of presents to the ED BIBEMS from home c/o momentary AMS, fevers, LLQ abd pain and dysuria. Tmax today of 105 associated with an episode of AMS. + n/v.  Pt was recently seen at the urologist and was found to have blood in urine. Reports decreased urine out put however increased frequency.  Reports normal bowel movements. No PSHx    urologist: Nolberto Elder 76y M with PMHx BPH,  presents to the ED BIBEMS from home c/o momentary AMS, fevers, LLQ abd pain and dysuria. Tmax today of 105 associated with an episode of AMS. + n/v.  Pt was recently seen at the urologist and was found to have blood in urine. Reports decreased urine out put however increased frequency.  Reports normal bowel movements. No PSHx    urologist: Nolberto Elder

## 2021-09-05 NOTE — H&P ADULT - NSHPPHYSICALEXAM_GEN_ALL_CORE
general: NAD, resting comfortably, A&Ox3   respiratory: no respiratory distress   abd: soft, nontender, nondistended   back: no CVAT bilaterally   gu: voiding

## 2021-09-05 NOTE — ED PROVIDER NOTE - CARE PLAN
1 Principal Discharge DX:	Sepsis  Secondary Diagnosis:	Complicated UTI (urinary tract infection)  Secondary Diagnosis:	Kidney stone

## 2021-09-05 NOTE — ED PROVIDER NOTE - CLINICAL SUMMARY MEDICAL DECISION MAKING FREE TEXT BOX
Paco Mckeon): 76y M presents to the ED c/o momentary AMS, increased frequency, dysuria. Now at baseline. Symptoms clinically septic. Likely urinary in nature. Plan sepsis protocol, CTAP. Likely to be admitted. Paco Mckeon): 76y M presents to the ED c/o momentary AMS, increased frequency, dysuria. Now at baseline. Symptoms clinically septic. Likely urinary in nature. Plan sepsis protocol, CTAP. Likely to be admitted.    Attending Karolyn: 77 y/o M w/ PMH of BPH presenting w/ abdominal pain and fevers. Seen in [place], w/ daughter, JESSY. Reporting since yesterday having R sided flank pain radiating to abdomen. Sharp, intermittent. Was seen by urologist earlier this week for similar, was reportedly found to have blood in urine and told to come to ED if pain worsened. Today pain worsened and pt developed fever of 105F. Per daughter was confused at that time as well. Now back to baseline (A&O x3). Does endorse dark colored urine, increased urinary frequency, and decreased urine output nausea, and NBNB vomiting. On exam overall in no acute distress. Tachycardic. Lungs clear. R CVA tenderness. Abd w/ mild tenderness throughout. Concern for infectious process, suspect urinary source. Possible kidney stone given symptoms. Will r/o acute abdominal process as well. Plan for labs, IVF, imaging, urine studies, abx, meds. Pt will require admission. Will reassess the need for additional interventions as clinically warranted.

## 2021-09-05 NOTE — ED PROVIDER NOTE - PHYSICAL EXAMINATION
Paco Mckeon (MD)    GENERAL: well appearing in no acute distress, non-toxic appearing  HEAD: normocephalic, atraumatic  HEENT: normal conjunctiva, oral mucosa moist, uvula midline, no tonsilar exudates, neck supple, no JVD  CARDIAC: tachycardic, regular rhythm, normal S1S2, no appreciable murmurs, 2+ pulses in UE/LE b/l  PULM: normal breath sounds, clear to ascultation bilaterally, no rales, rhonchi, wheezing  GI: R sided CVAT, distended abd. tender throughout nonfocal. voluntary guarding no rebounds.   : no CVA tenderness b/l, no suprapubic tenderness  NEURO: no focal motor or sensory deficits, CN2-12 intact, normal speech, PERRLA, EOMI, normal gait, AAOx3  MSK: no peripheral edema, no calf tenderness b/l  SKIN: flush, no visible rashes  PSYCH: appropriate mood and affect

## 2021-09-05 NOTE — ED ADULT NURSE NOTE - OBJECTIVE STATEMENT
76 year old male PMH of BPH presents to the ED via EMS from home complaining of 2 days of R Flank pain and fever (tmax 105 yesterday). Patient states he saw his urologist 3 days ago for flank pain and dribbling/concentrated urine, had UA which showed trace blood, was advised to come to ED if pain worsened or if fever developed. Patient states temp was >101 at home, took ibuprofen at 1530. On arrival to ED, oral temp is 100.8. Patient is A&Ox3, complaining of R flank pain. Bladder scan showing 35mL. Pt. endorses nausea with no vomiting. denies chest pain, sob, vomiting, diarrhea, dysuria, hematuria, recent travel/sick contacts. Daughter at bedside.

## 2021-09-05 NOTE — H&P ADULT - HISTORY OF PRESENT ILLNESS
76y M with PMHx of BPH, nephrolithiasis spontaneously passed 20 years ago, BIBEMS from home with AMS, fevers, right flank pain. Tmax today of 105 with ear thermometer associated with an episode of AMS. Pt states he began having right flank pain yesterday with fevers which was controlled with motrin and this morning he developed AMS, nausea with 2 episodes of vomiting and was brought in to the ED. Pt was recently seen by Dr. Elder and was found to have blood in urine but no workup was done. Reports increased urinary frequency and dark colored urine since yesterday. Denies dysuria, abdominal pain, constipation, CP, SOB.     In ED: patient with a temp of 100.8, tachycardic to 120's, WC 17, Cr 1.05, UA with large leuks, 460 WBC's, bacteria, CT scan reveals mild R hydro with a 7mm distal ureteral stone.

## 2021-09-06 LAB
ANION GAP SERPL CALC-SCNC: 14 MMOL/L — SIGNIFICANT CHANGE UP (ref 5–17)
BUN SERPL-MCNC: 14 MG/DL — SIGNIFICANT CHANGE UP (ref 7–23)
CALCIUM SERPL-MCNC: 9.5 MG/DL — SIGNIFICANT CHANGE UP (ref 8.4–10.5)
CHLORIDE SERPL-SCNC: 102 MMOL/L — SIGNIFICANT CHANGE UP (ref 96–108)
CO2 SERPL-SCNC: 20 MMOL/L — LOW (ref 22–31)
CREAT SERPL-MCNC: 0.88 MG/DL — SIGNIFICANT CHANGE UP (ref 0.5–1.3)
E COLI DNA BLD POS QL NAA+NON-PROBE: SIGNIFICANT CHANGE UP
GLUCOSE SERPL-MCNC: 146 MG/DL — HIGH (ref 70–99)
GRAM STN FLD: SIGNIFICANT CHANGE UP
HCT VFR BLD CALC: 40.5 % — SIGNIFICANT CHANGE UP (ref 39–50)
HGB BLD-MCNC: 12.9 G/DL — LOW (ref 13–17)
MCHC RBC-ENTMCNC: 27.3 PG — SIGNIFICANT CHANGE UP (ref 27–34)
MCHC RBC-ENTMCNC: 31.9 GM/DL — LOW (ref 32–36)
MCV RBC AUTO: 85.8 FL — SIGNIFICANT CHANGE UP (ref 80–100)
METHOD TYPE: SIGNIFICANT CHANGE UP
NRBC # BLD: 0 /100 WBCS — SIGNIFICANT CHANGE UP (ref 0–0)
PLATELET # BLD AUTO: 247 K/UL — SIGNIFICANT CHANGE UP (ref 150–400)
POTASSIUM SERPL-MCNC: 3.9 MMOL/L — SIGNIFICANT CHANGE UP (ref 3.5–5.3)
POTASSIUM SERPL-SCNC: 3.9 MMOL/L — SIGNIFICANT CHANGE UP (ref 3.5–5.3)
RBC # BLD: 4.72 M/UL — SIGNIFICANT CHANGE UP (ref 4.2–5.8)
RBC # FLD: 14.6 % — HIGH (ref 10.3–14.5)
SARS-COV-2 RNA SPEC QL NAA+PROBE: SIGNIFICANT CHANGE UP
SODIUM SERPL-SCNC: 136 MMOL/L — SIGNIFICANT CHANGE UP (ref 135–145)
WBC # BLD: 20.35 K/UL — HIGH (ref 3.8–10.5)
WBC # FLD AUTO: 20.35 K/UL — HIGH (ref 3.8–10.5)

## 2021-09-06 PROCEDURE — 99231 SBSQ HOSP IP/OBS SF/LOW 25: CPT | Mod: GC

## 2021-09-06 RX ORDER — TAMSULOSIN HYDROCHLORIDE 0.4 MG/1
0.4 CAPSULE ORAL AT BEDTIME
Refills: 0 | Status: DISCONTINUED | OUTPATIENT
Start: 2021-09-06 | End: 2021-09-08

## 2021-09-06 RX ORDER — PIPERACILLIN AND TAZOBACTAM 4; .5 G/20ML; G/20ML
3.38 INJECTION, POWDER, LYOPHILIZED, FOR SOLUTION INTRAVENOUS EVERY 8 HOURS
Refills: 0 | Status: DISCONTINUED | OUTPATIENT
Start: 2021-09-06 | End: 2021-09-08

## 2021-09-06 RX ORDER — FINASTERIDE 5 MG/1
5 TABLET, FILM COATED ORAL DAILY
Refills: 0 | Status: DISCONTINUED | OUTPATIENT
Start: 2021-09-06 | End: 2021-09-08

## 2021-09-06 RX ORDER — ONDANSETRON 8 MG/1
4 TABLET, FILM COATED ORAL EVERY 6 HOURS
Refills: 0 | Status: DISCONTINUED | OUTPATIENT
Start: 2021-09-05 | End: 2021-09-08

## 2021-09-06 RX ORDER — ACETAMINOPHEN 500 MG
975 TABLET ORAL EVERY 6 HOURS
Refills: 0 | Status: DISCONTINUED | OUTPATIENT
Start: 2021-09-06 | End: 2021-09-08

## 2021-09-06 RX ORDER — INFLUENZA VIRUS VACCINE 15; 15; 15; 15 UG/.5ML; UG/.5ML; UG/.5ML; UG/.5ML
0.5 SUSPENSION INTRAMUSCULAR ONCE
Refills: 0 | Status: DISCONTINUED | OUTPATIENT
Start: 2021-09-06 | End: 2021-09-08

## 2021-09-06 RX ORDER — HEPARIN SODIUM 5000 [USP'U]/ML
5000 INJECTION INTRAVENOUS; SUBCUTANEOUS EVERY 8 HOURS
Refills: 0 | Status: DISCONTINUED | OUTPATIENT
Start: 2021-09-05 | End: 2021-09-08

## 2021-09-06 RX ORDER — SENNA PLUS 8.6 MG/1
1 TABLET ORAL AT BEDTIME
Refills: 0 | Status: DISCONTINUED | OUTPATIENT
Start: 2021-09-05 | End: 2021-09-08

## 2021-09-06 RX ADMIN — PIPERACILLIN AND TAZOBACTAM 25 GRAM(S): 4; .5 INJECTION, POWDER, LYOPHILIZED, FOR SOLUTION INTRAVENOUS at 16:13

## 2021-09-06 RX ADMIN — HEPARIN SODIUM 5000 UNIT(S): 5000 INJECTION INTRAVENOUS; SUBCUTANEOUS at 13:17

## 2021-09-06 RX ADMIN — FINASTERIDE 5 MILLIGRAM(S): 5 TABLET, FILM COATED ORAL at 11:45

## 2021-09-06 RX ADMIN — HEPARIN SODIUM 5000 UNIT(S): 5000 INJECTION INTRAVENOUS; SUBCUTANEOUS at 05:07

## 2021-09-06 RX ADMIN — TAMSULOSIN HYDROCHLORIDE 0.4 MILLIGRAM(S): 0.4 CAPSULE ORAL at 21:29

## 2021-09-06 RX ADMIN — PIPERACILLIN AND TAZOBACTAM 25 GRAM(S): 4; .5 INJECTION, POWDER, LYOPHILIZED, FOR SOLUTION INTRAVENOUS at 01:00

## 2021-09-06 RX ADMIN — HEPARIN SODIUM 5000 UNIT(S): 5000 INJECTION INTRAVENOUS; SUBCUTANEOUS at 21:29

## 2021-09-06 RX ADMIN — PIPERACILLIN AND TAZOBACTAM 25 GRAM(S): 4; .5 INJECTION, POWDER, LYOPHILIZED, FOR SOLUTION INTRAVENOUS at 09:08

## 2021-09-06 NOTE — PROVIDER CONTACT NOTE (CRITICAL VALUE NOTIFICATION) - TEST AND RESULT REPORTED:
blood culture from 9/5/21. preliminary growth in anaerobic bottle gram negative rods.
blood culture from 9/5. Preliminary growth in aerobic bottle gram negative rods.

## 2021-09-06 NOTE — PROGRESS NOTE ADULT - SUBJECTIVE AND OBJECTIVE BOX
Post op Check    Pt seen and examined without complaints. Pain is controlled. Denies SOB/CP/N/V. Pt voided 2x, pink tinged urine. States he had discomfort during first void but denies any further discomfort.     Vital Signs Last 24 Hrs  T(C): 36.2 (06 Sep 2021 00:22), Max: 38.2 (05 Sep 2021 18:10)  T(F): 97.2 (06 Sep 2021 00:22), Max: 100.8 (05 Sep 2021 18:10)  HR: 76 (06 Sep 2021 01:00) (69 - 123)  BP: 130/69 (06 Sep 2021 01:00) (108/75 - 164/88)  BP(mean): 94 (06 Sep 2021 01:00) (88 - 120)  RR: 16 (06 Sep 2021 01:00) (16 - 20)  SpO2: 100% (06 Sep 2021 01:00) (92% - 100%)    I&O's Summary    05 Sep 2021 07:01  -  06 Sep 2021 01:33  --------------------------------------------------------  IN: 143 mL / OUT: 300 mL / NET: -157 mL    I&O's Detail    05 Sep 2021 07:01  -  06 Sep 2021 01:33  --------------------------------------------------------  IN:    IV PiggyBack: 25 mL    Oral Fluid: 118 mL  Total IN: 143 mL    OUT:    Voided (mL): 300 mL  Total OUT: 300 mL    Total NET: -157 mL          Physical Exam  Gen: awake alert NAD AXOX3  Pulm: No respiratory distress  Abd: Soft, NT, ND  Back: no CVAT bilaterally  : voiding                           13.3   17.86 )-----------( 242      ( 05 Sep 2021 18:18 )             41.4       09-05    137  |  102  |  19  ----------------------------<  154<H>  3.6   |  19<L>  |  1.05    Ca    9.9      05 Sep 2021 18:17    TPro  7.3  /  Alb  3.7  /  TBili  1.3<H>  /  DBili  x   /  AST  44<H>  /  ALT  55<H>  /  AlkPhos  102  09-05

## 2021-09-06 NOTE — PATIENT PROFILE ADULT - ARE SIGNIFICANT INDICATORS COMPLETE.
Anesthesia ROS/Med Hx    Overall Review:  Pts. EKG was reviewed and Pts.echo was reviewed   Pt. has no history of anesthetic complications  Pt. does not have difficult airway    Neuro/Psych Review:  Neuro/Psych Comments: DM neuropathy  Pt. positive for neuromuscular disease    Cardiovascular Review:  Cardiovascular ROS notes: PAS 41  PVOD - bilateral iliac stents, s/p CEA, claudication  TR/MR/AS  Pt. positive for CHF  Pt. positive for pulmonary hypertension  Pt. positive for CAD  Pt. positive for valvular problems/murmurs  Pt. positive for hypertension  Pt. positive for hyperlipidemia  Pt. positive for PVD    GI/HEPATIC/RENAL Review:    Pt. positive for renal disease - CRI    End/Other Review:    Pt. positive for diabetes  Pt. positive for anemia  Pt. positive for obesity     Anesthesia Plan  ASA Status: 3  Anesthesia Type: MAC  Induction: Intravenous  Maintenance: TIVA  Reviewed: Lab Results, Patient Summary, Past Med History, EKG, Beta Blocker Status, NPO Status, Allergies, Nursing Notes, Medications, Problem List and Pre-Induction Reassessment  The proposed anesthetic plan, including its risks and benefits, have been discussed with the Patient - along with the risks and benefits of alternatives.  Questions were encouraged and answered and the patient and/or representative agrees to proceed.  Informed Consent for Blood: Consented  Blood Products: Not Anticipated      Physical Exam   Yes

## 2021-09-06 NOTE — PROVIDER CONTACT NOTE (CRITICAL VALUE NOTIFICATION) - SITUATION
blood culture from 9/5. Preliminary growth in aerobic bottle gram negative rods.
blood culture from 9/5/21. preliminary growth in anaerobic bottle gram negative rods.

## 2021-09-06 NOTE — PROVIDER CONTACT NOTE (CRITICAL VALUE NOTIFICATION) - ASSESSMENT
pt is alert, awake ,[id number]. see flow sheet for vitals, pt denies any complains.
pt is alert, awake and [id number]. vitals stable. denies any complains.

## 2021-09-06 NOTE — PATIENT PROFILE ADULT - NSASFALLATTEMPTOOB_GEN_A_NUR
[de-identified] : CANDE VELÁSQUEZ is a 37 year  patient With a history of chronic sinusitis.  He has had persistent symptoms since February. He felt like he got sick and it never fully resolved. He has occasional green phlegm, constant nasal congestion, nasal obstruction, and crusting in the nose. he has no sinus pain or pressure.  He has about 3-4 infections each year. He did not notice any seasonal changes with his symptoms. He has tried a nasal saline spray and Claritin. He feels like his nasal obstruction is interfering with his sleep.\par \par Sinus medications: nasal saline spray and claritin as needed. \par \par History of frequent or recurrent sinus infections: 4 per year\par \par Allergy testing:  not tested\par Nasal/sinus surgery:  no\par Nasal trauma: he said he was told it was broken in the past\par Reflux or throat symptoms: occasional heartburn\par \par Pets at home: no\par Secondhand smoke exposure: no\par \par Sinus questionnaire was reviewed\par \par  no

## 2021-09-06 NOTE — PROGRESS NOTE ADULT - SUBJECTIVE AND OBJECTIVE BOX
Subjective    Objective    Vital signs  T(F): , Max: 100.8 (09-05-21 @ 18:10)  HR: 98 (09-06-21 @ 04:50)  BP: 121/81 (09-06-21 @ 04:50)  SpO2: 96% (09-06-21 @ 04:50)  Wt(kg): --    Output     09-05 @ 07:01  -  09-06 @ 07:00  --------------------------------------------------------  IN: 143 mL / OUT: 1050 mL / NET: -907 mL        Gen awake alert nad axox3  Abd  Back      Labs      09-06 @ 07:18    WBC 20.35 / Hct 40.5  / SCr 0.88     09-05 @ 18:18    WBC 17.86 / Hct 41.4  / SCr --         Urine Cx: sent  OR cx sent   Blood Cx: sent     Imaging Subjective  without complaints last fever yesterday evening   Objective    Vital signs  T(F): , Max: 100.8 (09-05-21 @ 18:10)  HR: 98 (09-06-21 @ 04:50)  BP: 121/81 (09-06-21 @ 04:50)  SpO2: 96% (09-06-21 @ 04:50)  Wt(kg): --    Output     09-05 @ 07:01  -  09-06 @ 07:00  --------------------------------------------------------  IN: 143 mL / OUT: 1050 mL / NET: -907 mL        Gen awake alert nad axox3  Abd soft ntnd   Back no cvat bl    nonpalp bladder     Labs      09-06 @ 07:18    WBC 20.35 / Hct 40.5  / SCr 0.88     09-05 @ 18:18    WBC 17.86 / Hct 41.4  / SCr --         Urine Cx: sent  OR cx sent   Blood Cx: sent     Imaging

## 2021-09-07 ENCOUNTER — TRANSCRIPTION ENCOUNTER (OUTPATIENT)
Age: 76
End: 2021-09-07

## 2021-09-07 LAB
-  AMIKACIN: SIGNIFICANT CHANGE UP
-  AMOXICILLIN/CLAVULANIC ACID: SIGNIFICANT CHANGE UP
-  AMPICILLIN/SULBACTAM: SIGNIFICANT CHANGE UP
-  AMPICILLIN: SIGNIFICANT CHANGE UP
-  AZTREONAM: SIGNIFICANT CHANGE UP
-  CEFAZOLIN: SIGNIFICANT CHANGE UP
-  CEFEPIME: SIGNIFICANT CHANGE UP
-  CEFOXITIN: SIGNIFICANT CHANGE UP
-  CEFTRIAXONE: SIGNIFICANT CHANGE UP
-  CIPROFLOXACIN: SIGNIFICANT CHANGE UP
-  ERTAPENEM: SIGNIFICANT CHANGE UP
-  GENTAMICIN: SIGNIFICANT CHANGE UP
-  IMIPENEM: SIGNIFICANT CHANGE UP
-  LEVOFLOXACIN: SIGNIFICANT CHANGE UP
-  MEROPENEM: SIGNIFICANT CHANGE UP
-  NITROFURANTOIN: SIGNIFICANT CHANGE UP
-  PIPERACILLIN/TAZOBACTAM: SIGNIFICANT CHANGE UP
-  TIGECYCLINE: SIGNIFICANT CHANGE UP
-  TOBRAMYCIN: SIGNIFICANT CHANGE UP
-  TRIMETHOPRIM/SULFAMETHOXAZOLE: SIGNIFICANT CHANGE UP
COVID-19 SPIKE DOMAIN AB INTERP: POSITIVE
COVID-19 SPIKE DOMAIN ANTIBODY RESULT: 225 U/ML — HIGH
CULTURE RESULTS: SIGNIFICANT CHANGE UP
GRAM STN FLD: SIGNIFICANT CHANGE UP
HCT VFR BLD CALC: 37.8 % — LOW (ref 39–50)
HGB BLD-MCNC: 12.3 G/DL — LOW (ref 13–17)
MCHC RBC-ENTMCNC: 27.7 PG — SIGNIFICANT CHANGE UP (ref 27–34)
MCHC RBC-ENTMCNC: 32.5 GM/DL — SIGNIFICANT CHANGE UP (ref 32–36)
MCV RBC AUTO: 85.1 FL — SIGNIFICANT CHANGE UP (ref 80–100)
METHOD TYPE: SIGNIFICANT CHANGE UP
NRBC # BLD: 0 /100 WBCS — SIGNIFICANT CHANGE UP (ref 0–0)
ORGANISM # SPEC MICROSCOPIC CNT: SIGNIFICANT CHANGE UP
ORGANISM # SPEC MICROSCOPIC CNT: SIGNIFICANT CHANGE UP
PLATELET # BLD AUTO: 264 K/UL — SIGNIFICANT CHANGE UP (ref 150–400)
RBC # BLD: 4.44 M/UL — SIGNIFICANT CHANGE UP (ref 4.2–5.8)
RBC # FLD: 14.6 % — HIGH (ref 10.3–14.5)
SARS-COV-2 IGG+IGM SERPL QL IA: 225 U/ML — HIGH
SARS-COV-2 IGG+IGM SERPL QL IA: POSITIVE
SPECIMEN SOURCE: SIGNIFICANT CHANGE UP
SPECIMEN SOURCE: SIGNIFICANT CHANGE UP
WBC # BLD: 12.78 K/UL — HIGH (ref 3.8–10.5)
WBC # FLD AUTO: 12.78 K/UL — HIGH (ref 3.8–10.5)

## 2021-09-07 RX ORDER — POLYETHYLENE GLYCOL 3350 17 G/17G
17 POWDER, FOR SOLUTION ORAL DAILY
Refills: 0 | Status: DISCONTINUED | OUTPATIENT
Start: 2021-09-08 | End: 2021-09-08

## 2021-09-07 RX ADMIN — FINASTERIDE 5 MILLIGRAM(S): 5 TABLET, FILM COATED ORAL at 12:19

## 2021-09-07 RX ADMIN — HEPARIN SODIUM 5000 UNIT(S): 5000 INJECTION INTRAVENOUS; SUBCUTANEOUS at 13:18

## 2021-09-07 RX ADMIN — TAMSULOSIN HYDROCHLORIDE 0.4 MILLIGRAM(S): 0.4 CAPSULE ORAL at 21:29

## 2021-09-07 RX ADMIN — HEPARIN SODIUM 5000 UNIT(S): 5000 INJECTION INTRAVENOUS; SUBCUTANEOUS at 21:29

## 2021-09-07 RX ADMIN — HEPARIN SODIUM 5000 UNIT(S): 5000 INJECTION INTRAVENOUS; SUBCUTANEOUS at 05:19

## 2021-09-07 RX ADMIN — PIPERACILLIN AND TAZOBACTAM 25 GRAM(S): 4; .5 INJECTION, POWDER, LYOPHILIZED, FOR SOLUTION INTRAVENOUS at 17:12

## 2021-09-07 RX ADMIN — PIPERACILLIN AND TAZOBACTAM 25 GRAM(S): 4; .5 INJECTION, POWDER, LYOPHILIZED, FOR SOLUTION INTRAVENOUS at 00:59

## 2021-09-07 RX ADMIN — Medication 10 MILLIGRAM(S): at 18:14

## 2021-09-07 RX ADMIN — PIPERACILLIN AND TAZOBACTAM 25 GRAM(S): 4; .5 INJECTION, POWDER, LYOPHILIZED, FOR SOLUTION INTRAVENOUS at 08:52

## 2021-09-07 RX ADMIN — SENNA PLUS 1 TABLET(S): 8.6 TABLET ORAL at 21:29

## 2021-09-07 NOTE — PROGRESS NOTE ADULT - SUBJECTIVE AND OBJECTIVE BOX
Urology PA Progress Note    Subjective:  Patient seen and examined at bedside. No acute events overnight. Pain controlled, no nausea/vomiting    Objective:  Vital signs  T(F): , Max: 99.8 (21 @ 01:48)  HR: 88 (21 @ 05:17)  BP: 121/67 (21 @ 05:17)  SpO2: 95% (21 @ 05:17)  Wt(kg): --    Output      @ 07:01  -   @ 07:00  --------------------------------------------------------  IN: 1100 mL / OUT: 1975 mL / NET: -875 mL    void 1125    Physical Exam:  Gen: NAD. AAOx3  Pulm: nonlabored  Abd: soft, NT/ND  : no CVAT b/l    Labs:    12.78 / 37.8  /x        20.35 / 40.5  /0.88                           12.3   12.78 )-----------( 264      ( 07 Sep 2021 07:12 )             37.8         136  |  102  |  14  ----------------------------<  146<H>  3.9   |  20<L>  |  0.88    Ca    9.5      06 Sep 2021 07:18    TPro  7.3  /  Alb  3.7  /  TBili  1.3<H>  /  DBili  x   /  AST  44<H>  /  ALT  55<H>  /  AlkPhos  102      PT/INR - ( 05 Sep 2021 19:50 )   PT: 14.8 sec;   INR: 1.25 ratio         PTT - ( 05 Sep 2021 19:50 )  PTT:28.7 sec  Urinalysis Basic - ( 05 Sep 2021 18:48 )    Color: Yellow / Appearance: Slightly Turbid / S.027 / pH: x  Gluc: x / Ketone: Negative  / Bili: Negative / Urobili: 2 mg/dL   Blood: x / Protein: 300 mg/dL / Nitrite: Negative   Leuk Esterase: Large / RBC: 4 /hpf /  /HPF   Sq Epi: x / Non Sq Epi: 2 /hpf / Bacteria: Many    Cx: Clean Catch Clean Catch (Midstream)   @ 22:21   >100,000 CFU/ml Escherichia coli  --  --      .Blood Blood-Peripheral   @ 22:02   Growth in aerobic bottle: Gram Negative Rods  ***Blood Panel PCR results on this specimen are available  approximately 3 hours after the Gram stain result.***  Gram stain, PCR, and/or culture results may not always  correspond due to difference in methodologies.  ************************************************************  This PCR assay was performed by multiplex PCR. This  Assay tests for 66 bacterial and resistance gene targets.  Please refer to the [facility] test directory  at [email or web address] for details.  --  Blood Culture PCR

## 2021-09-07 NOTE — DISCHARGE NOTE PROVIDER - NSDCCPTREATMENT_GEN_ALL_CORE_FT
PRINCIPAL PROCEDURE  Procedure: Cystoscopic insertion of right ureteral stent  Findings and Treatment:

## 2021-09-07 NOTE — DISCHARGE NOTE PROVIDER - CARE PROVIDER_API CALL
Obi Alvraado)  Urology  [facility] - Dept of Urology, [address] [place] [address]  Phone: [phone]  Fax: [phone]  Follow Up Time:    Obi Alvarado)  Urology  [facility] - Dept of Urology, [address] [place] [address]  Phone: [phone]  Fax: [phone]  Follow Up Time: 2 weeks

## 2021-09-07 NOTE — DISCHARGE NOTE PROVIDER - NSDCMRMEDTOKEN_GEN_ALL_CORE_FT
finasteride 5 mg oral tablet: 1 tab(s) orally once a day  tamsulosin 0.4 mg oral capsule: 1 cap(s) orally once a day   Bactrim  mg-160 mg oral tablet: 1 tab(s) orally 2 times a day   finasteride 5 mg oral tablet: 1 tab(s) orally once a day  tamsulosin 0.4 mg oral capsule: 1 cap(s) orally once a day

## 2021-09-07 NOTE — DISCHARGE NOTE PROVIDER - NSDCFUADDINST_GEN_ALL_CORE_FT
PAIN CONTROL: You may take over the counter Tylenol as needed for pain. Do not exceed more than 4000mg or 4 grams of Tylenol daily.    ANTIBIOTICS: Please complete the course of antibiotics sent your pharmacy as instructed.    BATHING: no restrictions    ACTIVITY: No heavy lifting or straining while stent in place. Drink plenty of fluids. You may have intermittent pink tinged urine and slight flank pain when you urinate.  This is normal and due to the stent in your ureter. If you are unable to urinate or your urine becomes bright red or with clots, please call the office.        NOTIFY YOUR SURGEON IF:  Fevers (over 100.4F) or chills, persistent nausea/vomiting, Pain not controlled on pain medications     PAIN CONTROL: You may take over the counter Tylenol as needed for pain. Do not exceed more than 4000mg or 4 grams of Tylenol daily.    STENT: You may have intermittent pink tinged urine and slight flank pain when you urinate.  This is normal and due to the stent in your ureter. It is not uncommon to have some burning when you urinate.  Some patients do not feel any discomfort from the stent, while others may feel the sensation of needing to urinate frequently, burning on urination, or even back pain that worsens with urination. These symptoms usually improve gradually, however it may be necessary to take pain medication until the discomfort subsides.  If you are unable to urinate or your urine becomes bright red or with clots, please call the office. Please make sure you drink plenty of fluids.     ANTIBIOTICS: Please complete the course of antibiotics sent your pharmacy as instructed.    BATHING: no restrictions    ACTIVITY: No heavy lifting or straining while stent in place. Drink plenty of fluids. You may have intermittent pink tinged urine and slight flank pain when you urinate.  This is normal and due to the stent in your ureter. If you are unable to urinate or your urine becomes bright red or with clots, please call the office.        NOTIFY YOUR SURGEON IF:  Fevers (over 100.4F) or chills, persistent nausea/vomiting, Pain not controlled on pain medications

## 2021-09-07 NOTE — DISCHARGE NOTE PROVIDER - NSDCCPCAREPLAN_GEN_ALL_CORE_FT
PRINCIPAL DISCHARGE DIAGNOSIS  Diagnosis: Sepsis  Assessment and Plan of Treatment:       SECONDARY DISCHARGE DIAGNOSES  Diagnosis: Complicated UTI (urinary tract infection)  Assessment and Plan of Treatment:     Diagnosis: Kidney stone  Assessment and Plan of Treatment:      PRINCIPAL DISCHARGE DIAGNOSIS  Diagnosis: Sepsis  Assessment and Plan of Treatment: You were found to be septic from a urinary tract infection in the setting of an obstructing ureteral stone when you first got to the hospital. You underwent cystoscopy with insertion of right ureteral stent and were treated with antibiotics. You urine and blood cultures initially grew E. coli. Repeat blood culutres were no growth to date. You were treated with Zosyn through the IV and transitioned to Bactrim orally. Please complete Bactrim course sent to your pharmacy. Call the office if you have fever greater than 101, difficulty urinating, pain not relieved with pain medication, nausea/vomiting or other worrisome symptoms arise.      SECONDARY DISCHARGE DIAGNOSES  Diagnosis: Complicated UTI (urinary tract infection)  Assessment and Plan of Treatment: You were treated with antibiotics. please finish antibiotic course sent to pharmacy.    Diagnosis: Kidney stone  Assessment and Plan of Treatment: follow up with Dr. Alvarado in 2 weeks to discuss definitive stone management.

## 2021-09-07 NOTE — DISCHARGE NOTE PROVIDER - HOSPITAL COURSE
76 year old male with hx of BPH, nephrolithiasis presented to the ED with AMS, fevers and right flank pain. Patient found to have 7mm right distal ureteral stone on CT scan. Patient emergently taken to the OR 9/5 for cystoscopy, right ureteral stent placement. Urine and blood cultures grew E.Coli on Zosyn IV. Repeat blood cultures no growth to date. Hospital course uneventful. On the day of discharge, patient was afebrile, tolerating diet, ambulating, voiding without difficultly and pain controlled. Patient is deemed stable for discharge home with follow up instructions 76 year old male with hx of BPH, nephrolithiasis presented to the ED with AMS, fevers and right flank pain. Patient found to have 7mm right distal ureteral stone on CT scan. Patient emergently taken to the OR 9/5 for cystoscopy, right ureteral stent placement. Urine and blood cultures grew E.Coli on Zosyn IV. Repeat blood cultures no growth to date. Hospital course uneventful. On the day of discharge, patient was afebrile, tolerating diet, ambulating, voiding without difficultly and pain controlled. Patient is deemed stable for discharge home with follow up instructions. Bactrim for 10days

## 2021-09-07 NOTE — DISCHARGE NOTE PROVIDER - NSDCACTIVITY_GEN_ALL_CORE
No heavy lifting/straining Showering allowed/Walking - Indoors allowed/No heavy lifting/straining/Walking - Outdoors allowed

## 2021-09-08 ENCOUNTER — TRANSCRIPTION ENCOUNTER (OUTPATIENT)
Age: 76
End: 2021-09-08

## 2021-09-08 VITALS
DIASTOLIC BLOOD PRESSURE: 74 MMHG | TEMPERATURE: 98 F | HEART RATE: 88 BPM | SYSTOLIC BLOOD PRESSURE: 155 MMHG | OXYGEN SATURATION: 97 % | RESPIRATION RATE: 17 BRPM

## 2021-09-08 LAB
-  AMIKACIN: SIGNIFICANT CHANGE UP
-  AMPICILLIN/SULBACTAM: SIGNIFICANT CHANGE UP
-  AMPICILLIN: SIGNIFICANT CHANGE UP
-  AZTREONAM: SIGNIFICANT CHANGE UP
-  CEFAZOLIN: SIGNIFICANT CHANGE UP
-  CEFEPIME: SIGNIFICANT CHANGE UP
-  CEFOXITIN: SIGNIFICANT CHANGE UP
-  CEFTRIAXONE: SIGNIFICANT CHANGE UP
-  CIPROFLOXACIN: SIGNIFICANT CHANGE UP
-  ERTAPENEM: SIGNIFICANT CHANGE UP
-  GENTAMICIN: SIGNIFICANT CHANGE UP
-  IMIPENEM: SIGNIFICANT CHANGE UP
-  LEVOFLOXACIN: SIGNIFICANT CHANGE UP
-  MEROPENEM: SIGNIFICANT CHANGE UP
-  PIPERACILLIN/TAZOBACTAM: SIGNIFICANT CHANGE UP
-  TOBRAMYCIN: SIGNIFICANT CHANGE UP
-  TRIMETHOPRIM/SULFAMETHOXAZOLE: SIGNIFICANT CHANGE UP
CULTURE RESULTS: SIGNIFICANT CHANGE UP
METHOD TYPE: SIGNIFICANT CHANGE UP
ORGANISM # SPEC MICROSCOPIC CNT: SIGNIFICANT CHANGE UP
SPECIMEN SOURCE: SIGNIFICANT CHANGE UP

## 2021-09-08 PROCEDURE — C2617: CPT

## 2021-09-08 PROCEDURE — C1769: CPT

## 2021-09-08 PROCEDURE — 85027 COMPLETE CBC AUTOMATED: CPT

## 2021-09-08 PROCEDURE — 85025 COMPLETE CBC W/AUTO DIFF WBC: CPT

## 2021-09-08 PROCEDURE — 86769 SARS-COV-2 COVID-19 ANTIBODY: CPT

## 2021-09-08 PROCEDURE — 82947 ASSAY GLUCOSE BLOOD QUANT: CPT

## 2021-09-08 PROCEDURE — 85730 THROMBOPLASTIN TIME PARTIAL: CPT

## 2021-09-08 PROCEDURE — 87186 SC STD MICRODIL/AGAR DIL: CPT

## 2021-09-08 PROCEDURE — 99285 EMERGENCY DEPT VISIT HI MDM: CPT | Mod: 25

## 2021-09-08 PROCEDURE — U0005: CPT

## 2021-09-08 PROCEDURE — C1758: CPT

## 2021-09-08 PROCEDURE — 96375 TX/PRO/DX INJ NEW DRUG ADDON: CPT

## 2021-09-08 PROCEDURE — U0003: CPT

## 2021-09-08 PROCEDURE — 84295 ASSAY OF SERUM SODIUM: CPT

## 2021-09-08 PROCEDURE — 96374 THER/PROPH/DIAG INJ IV PUSH: CPT

## 2021-09-08 PROCEDURE — 74177 CT ABD & PELVIS W/CONTRAST: CPT | Mod: MA

## 2021-09-08 PROCEDURE — 36415 COLL VENOUS BLD VENIPUNCTURE: CPT

## 2021-09-08 PROCEDURE — 87086 URINE CULTURE/COLONY COUNT: CPT

## 2021-09-08 PROCEDURE — 84132 ASSAY OF SERUM POTASSIUM: CPT

## 2021-09-08 PROCEDURE — 82803 BLOOD GASES ANY COMBINATION: CPT

## 2021-09-08 PROCEDURE — 86900 BLOOD TYPING SEROLOGIC ABO: CPT

## 2021-09-08 PROCEDURE — 80053 COMPREHEN METABOLIC PANEL: CPT

## 2021-09-08 PROCEDURE — 87040 BLOOD CULTURE FOR BACTERIA: CPT

## 2021-09-08 PROCEDURE — 81001 URINALYSIS AUTO W/SCOPE: CPT

## 2021-09-08 PROCEDURE — 82330 ASSAY OF CALCIUM: CPT

## 2021-09-08 PROCEDURE — 82435 ASSAY OF BLOOD CHLORIDE: CPT

## 2021-09-08 PROCEDURE — 93005 ELECTROCARDIOGRAM TRACING: CPT

## 2021-09-08 PROCEDURE — C9399: CPT

## 2021-09-08 PROCEDURE — 85018 HEMOGLOBIN: CPT

## 2021-09-08 PROCEDURE — 86901 BLOOD TYPING SEROLOGIC RH(D): CPT

## 2021-09-08 PROCEDURE — 87150 DNA/RNA AMPLIFIED PROBE: CPT

## 2021-09-08 PROCEDURE — 85014 HEMATOCRIT: CPT

## 2021-09-08 PROCEDURE — 80048 BASIC METABOLIC PNL TOTAL CA: CPT

## 2021-09-08 PROCEDURE — 86850 RBC ANTIBODY SCREEN: CPT

## 2021-09-08 PROCEDURE — 71045 X-RAY EXAM CHEST 1 VIEW: CPT

## 2021-09-08 PROCEDURE — 76000 FLUOROSCOPY <1 HR PHYS/QHP: CPT

## 2021-09-08 PROCEDURE — 83605 ASSAY OF LACTIC ACID: CPT

## 2021-09-08 PROCEDURE — 85610 PROTHROMBIN TIME: CPT

## 2021-09-08 RX ORDER — AZTREONAM 2 G
1 VIAL (EA) INJECTION
Qty: 20 | Refills: 0
Start: 2021-09-08 | End: 2021-09-17

## 2021-09-08 RX ADMIN — PIPERACILLIN AND TAZOBACTAM 25 GRAM(S): 4; .5 INJECTION, POWDER, LYOPHILIZED, FOR SOLUTION INTRAVENOUS at 09:54

## 2021-09-08 RX ADMIN — PIPERACILLIN AND TAZOBACTAM 25 GRAM(S): 4; .5 INJECTION, POWDER, LYOPHILIZED, FOR SOLUTION INTRAVENOUS at 00:40

## 2021-09-08 RX ADMIN — PIPERACILLIN AND TAZOBACTAM 25 GRAM(S): 4; .5 INJECTION, POWDER, LYOPHILIZED, FOR SOLUTION INTRAVENOUS at 16:31

## 2021-09-08 RX ADMIN — HEPARIN SODIUM 5000 UNIT(S): 5000 INJECTION INTRAVENOUS; SUBCUTANEOUS at 05:58

## 2021-09-08 RX ADMIN — FINASTERIDE 5 MILLIGRAM(S): 5 TABLET, FILM COATED ORAL at 13:16

## 2021-09-08 RX ADMIN — HEPARIN SODIUM 5000 UNIT(S): 5000 INJECTION INTRAVENOUS; SUBCUTANEOUS at 13:16

## 2021-09-08 NOTE — PROGRESS NOTE ADULT - SUBJECTIVE AND OBJECTIVE BOX
UROLOGY DAILY PROGRESS NOTE:     Subjective: Patient seen and examined at bedside. No overnight events.       Objective:  Vital signs  T(F): , Max: 100.3 (09-07-21 @ 17:14)  HR: 89 (09-08-21 @ 05:38)  BP: 135/75 (09-08-21 @ 05:38)  SpO2: 95% (09-08-21 @ 05:38)  Wt(kg): --    I&O's Summary    07 Sep 2021 07:01  -  08 Sep 2021 07:00  --------------------------------------------------------  IN: 960 mL / OUT: 1300 mL / NET: -340 mL        Gen: NAD  Pulm: No respiratory distress, no subcostal retractions  CV: RRR, no JVD  Abd: Soft, NT, ND      Labs:  09-07  12.78 / 37.8  /x                              12.3   12.78 )-----------( 264      ( 07 Sep 2021 07:12 )             37.8               Urine Cx:  Culture - Urine (09.05.21 @ 22:21)    -  Amikacin: S <=16    -  Amoxicillin/Clavulanic Acid: S <=8/4    -  Ampicillin: R >16 These ampicillin results predict results for amoxicillin    -  Ampicillin/Sulbactam: I 16/8 Enterobacter, Citrobacter, and Serratia may develop resistance during prolonged therapy (3-4 days)    -  Aztreonam: S <=4    -  Cefazolin: S <=2 (MIC_CL_COM_ENTERIC_CEFAZU) For uncomplicated UTI with K. pneumoniae, E. coli, or P. mirablis: JOSHUA <=16 is sensitive and JOSHUA >=32 is resistant. This also predicts results for oral agents cefaclor, cefdinir, cefpodoxime, cefprozil, cefuroxime axetil, cephalexin and locarbef for uncomplicated UTI. Note that some isolates may be susceptible to these agents while testing resistant to cefazolin.    -  Cefepime: S <=2    -  Cefoxitin: S <=8    -  Ceftriaxone: S <=1 Enterobacter, Citrobacter, and Serratia may develop resistance during prolonged therapy    -  Ciprofloxacin: S <=0.25    -  Ertapenem: S <=0.5    -  Gentamicin: S <=2    -  Imipenem: S <=1    -  Levofloxacin: S <=0.5    -  Meropenem: S <=1    -  Nitrofurantoin: S <=32 Should not be used to treat pyelonephritis    -  Piperacillin/Tazobactam: S <=8    -  Tigecycline: S <=2    -  Tobramycin: S <=2    -  Trimethoprim/Sulfamethoxazole: S <=0.5/9.5    Specimen Source: Clean Catch Clean Catch (Midstream)    Culture Results:   >100,000 CFU/ml Escherichia coli    Organism Identification: Escherichia coli    Organism: Escherichia coli    Method Type: JOSHUA    Culture - Blood (09.06.21 @ 18:25)    Gram Stain:   Growth in aerobic bottle: Gram Negative Rods    Specimen Source: .Blood Blood-Venous    Culture Results:   Growth in aerobic bottle: Gram Negative Rods

## 2021-09-08 NOTE — PROGRESS NOTE ADULT - ASSESSMENT
A/P: 76y Male s/p R ureteral stent placement due to obstructing 7mm right distal stone causing sepsis     -DVT prophylaxis/OOB  -Incentive spirometry  -Strict I&O's  -Analgesia and antiemetics as needed  -Regular Diet  -AM labs  - awaiting cx and trending fever curve 
A/P: 76 year old male a/w septic 7mm right distal stone s/p right ureteral stent placement 9/5. E.coli bacteremia & UTI    - Diet  - pain control prn  - c/w Zosyn  - OOB/ambulate  - encourage incentive spirometry  - f/u repeat blood cultures  - flomax  - DVT ppx  - DC planning once new blood culture negative 
A/P: 76y Male s/p R ureteral stent placement     -DVT prophylaxis/OOB  -Incentive spirometry  -Strict I&O's  -Analgesia and antiemetics as needed  -Regular Diet  -AM labs
A/P: 76 year old male a/w septic 7mm right distal stone s/p right ureteral stent placement 9/5. E.coli bacteremia & UTI    - Diet  - pain control prn  - c/w Zosyn  - OOB/ambulate  - encourage incentive spirometry  - f/u repeat blood cultures  - flomax  - DVT ppx

## 2021-09-08 NOTE — DISCHARGE NOTE NURSING/CASE MANAGEMENT/SOCIAL WORK - PATIENT PORTAL LINK FT
You can access the FollowMyHealth Patient Portal offered by [facility] by registering at the following website: [email or web address]. By joining [facility]’s FollowMyHealth portal, you will also be able to view your health information using other applications (apps) compatible with our system.

## 2021-09-08 NOTE — PROGRESS NOTE ADULT - REASON FOR ADMISSION
septic 7mm R distal ureteral stone

## 2021-09-12 LAB
CULTURE RESULTS: SIGNIFICANT CHANGE UP
CULTURE RESULTS: SIGNIFICANT CHANGE UP
SPECIMEN SOURCE: SIGNIFICANT CHANGE UP
SPECIMEN SOURCE: SIGNIFICANT CHANGE UP

## 2021-09-27 ENCOUNTER — APPOINTMENT (OUTPATIENT)
Dept: UROLOGY | Facility: CLINIC | Age: 76
End: 2021-09-27
Payer: MEDICARE

## 2021-09-27 PROCEDURE — 99213 OFFICE O/P EST LOW 20 MIN: CPT

## 2021-09-27 NOTE — PHYSICAL EXAM
[General Appearance - Well Developed] : well developed [General Appearance - Well Nourished] : well nourished [Heart Rate And Rhythm] : Heart rate and rhythm were normal [] : no respiratory distress [Respiration, Rhythm And Depth] : normal respiratory rhythm and effort [Bowel Sounds] : normal bowel sounds [Abdomen Soft] : soft [Normal Station and Gait] : the gait and station were normal for the patient's age [Skin Color & Pigmentation] : normal skin color and pigmentation [Skin Turgor] : supple [No Focal Deficits] : no focal deficits [Oriented To Time, Place, And Person] : oriented to person, place, and time [Not Anxious] : not anxious

## 2021-10-05 LAB
BACTERIA UR CULT: ABNORMAL
PSA SERPL-MCNC: 5.76 NG/ML

## 2021-10-05 NOTE — HISTORY OF PRESENT ILLNESS
[FreeTextEntry1] : HPI: Mr. Storey is a 76 year old male with hx of BPH, nephrolithiasis presented to the ED with AMS, \par fevers and right flank pain. Patient found to have 7mm right distal ureteral  stone on CT scan. Patient emergently taken to the OR 9/5 for cystoscopy, right ureteral stent placement. Urine and blood cultures grew E.Coli on Zosyn IV. Repeat blood cultures no growth to date. Also found to have a 2-3 cm bladder stone. No nausea/vomiting, but has had fever at home to 102 since Saturday but did not want to go to the emergency room. \par \par PVR today 35 cc. Nocturia previously 3-4x. Had prevoiusly seen Dr. Pringle with PSA to 10.8, mpMRI PIRADS1 lesion noted, 145g gland. \par \par Anticoagulation: Previously on ASA\par All: None\par Social: lives at home, never smoker, never ETOH\par PMHx: BPH, nephrolithiasis\par FHx: gastric cancer\par PSHx: No prior surgeries\par Labs: Cr 0.9 9/6, UCx with E. coli, PIRADS1 lesion \par Imaging: CT in PACS with 7 mm ureteral stone, 2 cm bladder stone\par  [Urinary Incontinence] : no urinary incontinence [Urinary Retention] : no urinary retention [Urinary Urgency] : no urinary urgency [Urinary Frequency] : no urinary frequency

## 2021-10-05 NOTE — ASSESSMENT
[FreeTextEntry1] : 75 yo M with BPH and STERLING with bladder stone, 7 mm R ureteral stone and kidney stone

## 2021-10-08 ENCOUNTER — OUTPATIENT (OUTPATIENT)
Dept: OUTPATIENT SERVICES | Facility: HOSPITAL | Age: 76
LOS: 1 days | End: 2021-10-08
Payer: COMMERCIAL

## 2021-10-08 VITALS
HEART RATE: 91 BPM | WEIGHT: 179.02 LBS | DIASTOLIC BLOOD PRESSURE: 78 MMHG | OXYGEN SATURATION: 96 % | RESPIRATION RATE: 16 BRPM | TEMPERATURE: 98 F | SYSTOLIC BLOOD PRESSURE: 130 MMHG

## 2021-10-08 DIAGNOSIS — Z01.818 ENCOUNTER FOR OTHER PREPROCEDURAL EXAMINATION: ICD-10-CM

## 2021-10-08 DIAGNOSIS — N20.1 CALCULUS OF URETER: ICD-10-CM

## 2021-10-08 DIAGNOSIS — Z98.49 CATARACT EXTRACTION STATUS, UNSPECIFIED EYE: Chronic | ICD-10-CM

## 2021-10-08 DIAGNOSIS — Z96.0 PRESENCE OF UROGENITAL IMPLANTS: Chronic | ICD-10-CM

## 2021-10-08 LAB
ANION GAP SERPL CALC-SCNC: 15 MMOL/L — SIGNIFICANT CHANGE UP (ref 5–17)
BUN SERPL-MCNC: 15 MG/DL — SIGNIFICANT CHANGE UP (ref 7–23)
CALCIUM SERPL-MCNC: 9.6 MG/DL — SIGNIFICANT CHANGE UP (ref 8.4–10.5)
CHLORIDE SERPL-SCNC: 100 MMOL/L — SIGNIFICANT CHANGE UP (ref 96–108)
CO2 SERPL-SCNC: 23 MMOL/L — SIGNIFICANT CHANGE UP (ref 22–31)
CREAT SERPL-MCNC: 0.92 MG/DL — SIGNIFICANT CHANGE UP (ref 0.5–1.3)
GLUCOSE SERPL-MCNC: 99 MG/DL — SIGNIFICANT CHANGE UP (ref 70–99)
HCT VFR BLD CALC: 44.2 % — SIGNIFICANT CHANGE UP (ref 39–50)
HGB BLD-MCNC: 13.6 G/DL — SIGNIFICANT CHANGE UP (ref 13–17)
MCHC RBC-ENTMCNC: 27.1 PG — SIGNIFICANT CHANGE UP (ref 27–34)
MCHC RBC-ENTMCNC: 30.8 GM/DL — LOW (ref 32–36)
MCV RBC AUTO: 88.2 FL — SIGNIFICANT CHANGE UP (ref 80–100)
NRBC # BLD: 0 /100 WBCS — SIGNIFICANT CHANGE UP (ref 0–0)
PLATELET # BLD AUTO: 406 K/UL — HIGH (ref 150–400)
POTASSIUM SERPL-MCNC: 4.7 MMOL/L — SIGNIFICANT CHANGE UP (ref 3.5–5.3)
POTASSIUM SERPL-SCNC: 4.7 MMOL/L — SIGNIFICANT CHANGE UP (ref 3.5–5.3)
RBC # BLD: 5.01 M/UL — SIGNIFICANT CHANGE UP (ref 4.2–5.8)
RBC # FLD: 14.3 % — SIGNIFICANT CHANGE UP (ref 10.3–14.5)
SODIUM SERPL-SCNC: 138 MMOL/L — SIGNIFICANT CHANGE UP (ref 135–145)
WBC # BLD: 6.97 K/UL — SIGNIFICANT CHANGE UP (ref 3.8–10.5)
WBC # FLD AUTO: 6.97 K/UL — SIGNIFICANT CHANGE UP (ref 3.8–10.5)

## 2021-10-08 PROCEDURE — 87086 URINE CULTURE/COLONY COUNT: CPT

## 2021-10-08 PROCEDURE — G0463: CPT

## 2021-10-08 PROCEDURE — 80048 BASIC METABOLIC PNL TOTAL CA: CPT

## 2021-10-08 PROCEDURE — 85027 COMPLETE CBC AUTOMATED: CPT

## 2021-10-08 RX ORDER — ASCORBIC ACID 60 MG
1 TABLET,CHEWABLE ORAL
Qty: 0 | Refills: 0 | DISCHARGE

## 2021-10-08 RX ORDER — TAMSULOSIN HYDROCHLORIDE 0.4 MG/1
1 CAPSULE ORAL
Qty: 0 | Refills: 0 | DISCHARGE

## 2021-10-08 RX ORDER — FINASTERIDE 5 MG/1
1 TABLET, FILM COATED ORAL
Qty: 0 | Refills: 0 | DISCHARGE

## 2021-10-08 NOTE — H&P PST ADULT - NSICDXFAMILYHX_GEN_ALL_CORE_FT
FAMILY HISTORY:  Father  Still living? No  FH: stomach cancer, Age at diagnosis: Age Unknown    Sibling  Still living? No  FH: stomach cancer, Age at diagnosis: Age Unknown

## 2021-10-08 NOTE — H&P PST ADULT - VENOUS THROMBOEMBOLISM CURRENT LABS/TEST RESULTS
[address] [facility] ED  eMERGENCY dEPARTMENT eNCOUnter      Pt Name: Damaris Brannon  MRN: [id number]  [place] 2018  Date of evaluation: 3/9/2018  Provider: Maco [address] Dr       Chief Complaint   Patient presents with    Fever     x 2 days         HISTORY OF PRESENT ILLNESS   (Location/Symptom, Timing/Onset, Context/Setting, Quality, Duration, Modifying Factors, Severity)  Note limiting factors. Damaris Brannon is a 24 m.o. male who presents to the emergency department With 2 day history of fever cough congestion single episode vomiting. Patient irritable tugging at right ear. Symptoms moderate in severity nothing improves or worsens symptoms. HPI    Nursing Notes were reviewed. REVIEW OF SYSTEMS    (2-9 systems for level 4, 10 or more for level 5)     Review of Systems   Constitutional: Positive for fever and irritability. Negative for unexpected weight change. HENT: Positive for congestion and ear pain. Negative for dental problem, ear discharge and tinnitus. Eyes: Negative for photophobia and visual disturbance. Respiratory: Positive for cough. Negative for apnea. Cardiovascular: Negative for cyanosis. Gastrointestinal: Positive for vomiting. Negative for abdominal distention, abdominal pain and anal bleeding. Endocrine: Negative for cold intolerance and heat intolerance. Genitourinary: Negative for difficulty urinating and genital sores. Musculoskeletal: Negative for joint swelling. Skin: Negative for pallor. Allergic/Immunologic: Negative for immunocompromised state. Neurological: Negative for facial asymmetry and speech difficulty. Psychiatric/Behavioral: Negative for self-injury. All other systems reviewed and are negative. Except as noted above the remainder of the review of systems was reviewed and negative. PAST MEDICAL HISTORY   History reviewed. No pertinent past medical history.       SURGICAL HISTORY History reviewed. No pertinent surgical history. CURRENT MEDICATIONS       Previous Medications    ALBUTEROL SULFATE HFA (VENTOLIN HFA) 108 (90 BASE) MCG/ACT INHALER    Inhale 2 puffs into the lungs every 4 hours as needed for Wheezing    CETIRIZINE (ZYRTEC) 1 MG/ML SYRUP    Take 2.5 mLs by mouth daily    IBUPROFEN (ADVIL;MOTRIN) 100 MG/5ML SUSPENSION    Take by mouth every 4 hours as needed for Fever    ORAL ELECTROLYTES (PEDIALYTE) SOLN    Take 40 mLs by mouth 3 times daily as needed (vomiting)    PEDIATRIC MULTIVITAMIN-IRON (POLY-VI-SOL WITH IRON) SOLUTION    Take 1 mL by mouth    SPACER/AERO-HOLDING CHAMBERS (AEROCHAMBER PLUS PARMINDER-VU MEDIUM) MISC    1 Device by Does not apply route as needed (cough and wheezing)       ALLERGIES     Patient has no known allergies. FAMILY HISTORY       Family History   Problem Relation Age of Onset    Diabetes Maternal Grandmother           SOCIAL HISTORY       Social History     Social History    Marital status: Single     Spouse name: N/A    Number of children: N/A    Years of education: N/A     Social History Main Topics    Smoking status: Never Smoker    Smokeless tobacco: Never Used    Alcohol use None    Drug use: Unknown    Sexual activity: Not Asked     Other Topics Concern    None     Social History Narrative    None       SCREENINGS             PHYSICAL EXAM    (up to 7 for level 4, 8 or more for level 5)     ED Triage Vitals [03/09/18 2144]   BP Temp Temp Source Heart Rate Resp SpO2 Height Weight - Scale   -- 103.3 °F (39.6 °C) Rectal 190 (!) 36 95 % -- 30 lb (13.6 kg)       Physical Exam   Constitutional: He appears well-developed and well-nourished. He is active. No distress. HENT:   Head: No signs of injury. Right Ear: Tympanic membrane normal.   Left Ear: Tympanic membrane normal.   Nose: Nasal discharge present. Mouth/Throat: Mucous membranes are moist. Dentition is normal. No dental caries.    Eyes: Conjunctivae are normal. Pupils are equal, round, and reactive to light. Right eye exhibits no discharge. Left eye exhibits no discharge. Neck: Normal range of motion. Neck supple. Cardiovascular: S1 normal and S2 normal.    Tachycardic. Pulmonary/Chest: Effort normal and breath sounds normal. No nasal flaring or stridor. No respiratory distress. He has no wheezes. He has no rhonchi. He has no rales. He exhibits no retraction. Abdominal: Soft. Bowel sounds are normal. He exhibits no distension and no mass. There is no tenderness. Musculoskeletal: Normal range of motion. He exhibits no deformity or signs of injury. Neurological: He is alert. Skin: Skin is warm. Capillary refill takes less than 3 seconds. No petechiae noted. No cyanosis. Nursing note and vitals reviewed. DIAGNOSTIC RESULTS     EKG: All EKG's are interpreted by the Emergency Department Physician who either signs or Co-signs this chart in the absence of a cardiologist.         RADIOLOGY:   Non-plain film images such as CT, Ultrasound and MRI are read by the radiologist. Plain radiographic images are visualized and preliminarily interpreted by the emergency physician with the below findings:         Interpretation per the Radiologist below, if available at the time of this note:    No orders to display         ED BEDSIDE ULTRASOUND:   Performed by ED Physician - none    LABS:  Labs Reviewed   RSV RAPID ANTIGEN - Abnormal; Notable for the following:        Result Value    RSV Rapid Ag POSITIVE (*)     All other components within normal limits   RAPID INFLUENZA A/B ANTIGENS       All other labs were within normal range or not returned as of this dictation.     EMERGENCY DEPARTMENT COURSE and DIFFERENTIAL DIAGNOSIS/MDM:   Vitals:    Vitals:    03/09/18 2144 03/09/18 2335   Pulse: 190 172   Resp: (!) 36 28   Temp: 103.3 °F (39.6 °C) 100.2 °F (37.9 °C)   TempSrc: Rectal Rectal   SpO2: 95% 97%   Weight: 30 lb (13.6 kg)             MDM  Number of Diagnoses or Management none

## 2021-10-08 NOTE — H&P PST ADULT - GENERAL COMMENTS
Pt denies having traveled outside the country for the last 14 days. Pt denies having had the COVID19 infection and denies COVID19 positive contacts within the last 14 days. Pt received the 2nd dose of the Moderna COVID19 vaccine 3/2021. Pt denies having traveled outside the country for the last 14 days. Pt denies having had the COVID19 infection and denies COVID19 positive contacts within the last 14 days. Pt received the 2nd dose of the Moderna COVID19 vaccine 3/28/2021.

## 2021-10-08 NOTE — H&P PST ADULT - PROBLEM SELECTOR PLAN 2
Medical clearance needed as per surgeon. CBC, Basic panel and Urine culture ordered. Pre-op instructions given and pt verbalized understanding. Pt will make the appt for the COVID19 PCR testing at the [facility] testing site 3 days before surgery.

## 2021-10-08 NOTE — H&P PST ADULT - NSICDXPASTSURGICALHX_GEN_ALL_CORE_FT
PAST SURGICAL HISTORY:  Prostate Cancer Screening biopsy    S/P cataract surgery (Bilateral eyes, 2011)    S/P ureteral stent placement (9/4/2021)

## 2021-10-08 NOTE — H&P PST ADULT - NSICDXPASTMEDICALHX_GEN_ALL_CORE_FT
PAST MEDICAL HISTORY:  BPH (benign prostatic hyperplasia)      PAST MEDICAL HISTORY:  BPH (benign prostatic hyperplasia)     Calculus of ureter

## 2021-10-08 NOTE — H&P PST ADULT - NSANTHOSAYNRD_GEN_A_CORE
s/p sleep study in 2011, diagnosed with QUINTEN/No. QUINTEN screening performed.  STOP BANG Legend: 0-2 = LOW Risk; 3-4 = INTERMEDIATE Risk; 5-8 = HIGH Risk s/p sleep study in 2011, diagnosed with QUINTEN but refusing to use CPAP/No. QUINTEN screening performed.  STOP BANG Legend: 0-2 = LOW Risk; 3-4 = INTERMEDIATE Risk; 5-8 = HIGH Risk

## 2021-10-08 NOTE — H&P PST ADULT - HISTORY OF PRESENT ILLNESS
75 y/o male with PMH of BPH here for PST. Pt admitted to [facility] on 9/5/21 and diagnosed with 7mm right distal ureteral stone on CT scan. Pt s/p emergent cystoscopy and right ureteral stent placement. Pt was discharged to follow up with surgeon. Pt was found to have E.Coli in UCx and finished Augmentin on 10/5/2021. Pt complaining of occasional dysuria but denies hematuria and other voiding difficulties. Pt reports to intermittent right flank pain at times. Pt electing for cystoscopy, right ureteroscopy, laser lithotripsy, stent placement on 10/20/21.

## 2021-10-09 LAB
CULTURE RESULTS: NO GROWTH — SIGNIFICANT CHANGE UP
SPECIMEN SOURCE: SIGNIFICANT CHANGE UP

## 2021-10-15 PROBLEM — N40.0 BENIGN PROSTATIC HYPERPLASIA WITHOUT LOWER URINARY TRACT SYMPTOMS: Chronic | Status: ACTIVE | Noted: 2021-10-08

## 2021-10-16 ENCOUNTER — INPATIENT (INPATIENT)
Facility: HOSPITAL | Age: 76
LOS: 2 days | Discharge: HOME | DRG: 872 | End: 2021-10-19
Attending: INTERNAL MEDICINE | Admitting: INTERNAL MEDICINE
Payer: COMMERCIAL

## 2021-10-16 VITALS
TEMPERATURE: 102 F | HEART RATE: 114 BPM | DIASTOLIC BLOOD PRESSURE: 81 MMHG | SYSTOLIC BLOOD PRESSURE: 135 MMHG | HEIGHT: 78 IN | WEIGHT: 179.9 LBS | RESPIRATION RATE: 20 BRPM | OXYGEN SATURATION: 96 %

## 2021-10-16 DIAGNOSIS — N45.1 EPIDIDYMITIS: ICD-10-CM

## 2021-10-16 DIAGNOSIS — Z98.49 CATARACT EXTRACTION STATUS, UNSPECIFIED EYE: Chronic | ICD-10-CM

## 2021-10-16 DIAGNOSIS — Z96.0 PRESENCE OF UROGENITAL IMPLANTS: Chronic | ICD-10-CM

## 2021-10-16 LAB
ALBUMIN SERPL ELPH-MCNC: 3.2 G/DL — LOW (ref 3.3–5)
ALBUMIN SERPL ELPH-MCNC: 4 G/DL — SIGNIFICANT CHANGE UP (ref 3.3–5)
ALP SERPL-CCNC: 173 U/L — HIGH (ref 40–120)
ALP SERPL-CCNC: 206 U/L — HIGH (ref 40–120)
ALT FLD-CCNC: 65 U/L — HIGH (ref 10–45)
ALT FLD-CCNC: 74 U/L — HIGH (ref 10–45)
ANION GAP SERPL CALC-SCNC: 13 MMOL/L — SIGNIFICANT CHANGE UP (ref 5–17)
ANION GAP SERPL CALC-SCNC: 20 MMOL/L — HIGH (ref 5–17)
APPEARANCE UR: ABNORMAL
APTT BLD: 30.3 SEC — SIGNIFICANT CHANGE UP (ref 27.5–35.5)
AST SERPL-CCNC: 50 U/L — HIGH (ref 10–40)
AST SERPL-CCNC: 50 U/L — HIGH (ref 10–40)
BACTERIA # UR AUTO: ABNORMAL
BASE EXCESS BLDV CALC-SCNC: 0.3 MMOL/L — SIGNIFICANT CHANGE UP (ref -2–2)
BASE EXCESS BLDV CALC-SCNC: 0.8 MMOL/L — SIGNIFICANT CHANGE UP (ref -2–2)
BASOPHILS # BLD AUTO: 0.04 K/UL — SIGNIFICANT CHANGE UP (ref 0–0.2)
BASOPHILS NFR BLD AUTO: 0.2 % — SIGNIFICANT CHANGE UP (ref 0–2)
BILIRUB SERPL-MCNC: 1 MG/DL — SIGNIFICANT CHANGE UP (ref 0.2–1.2)
BILIRUB SERPL-MCNC: 1.2 MG/DL — SIGNIFICANT CHANGE UP (ref 0.2–1.2)
BILIRUB UR-MCNC: NEGATIVE — SIGNIFICANT CHANGE UP
BUN SERPL-MCNC: 9 MG/DL — SIGNIFICANT CHANGE UP (ref 7–23)
BUN SERPL-MCNC: 9 MG/DL — SIGNIFICANT CHANGE UP (ref 7–23)
CA-I SERPL-SCNC: 1.18 MMOL/L — SIGNIFICANT CHANGE UP (ref 1.15–1.33)
CA-I SERPL-SCNC: 1.22 MMOL/L — SIGNIFICANT CHANGE UP (ref 1.15–1.33)
CALCIUM SERPL-MCNC: 9.1 MG/DL — SIGNIFICANT CHANGE UP (ref 8.4–10.5)
CALCIUM SERPL-MCNC: 9.7 MG/DL — SIGNIFICANT CHANGE UP (ref 8.4–10.5)
CHLORIDE BLDV-SCNC: 100 MMOL/L — SIGNIFICANT CHANGE UP (ref 96–108)
CHLORIDE BLDV-SCNC: 99 MMOL/L — SIGNIFICANT CHANGE UP (ref 96–108)
CHLORIDE SERPL-SCNC: 100 MMOL/L — SIGNIFICANT CHANGE UP (ref 96–108)
CHLORIDE SERPL-SCNC: 96 MMOL/L — SIGNIFICANT CHANGE UP (ref 96–108)
CK SERPL-CCNC: 62 U/L — SIGNIFICANT CHANGE UP (ref 30–200)
CO2 BLDV-SCNC: 26 MMOL/L — SIGNIFICANT CHANGE UP (ref 22–26)
CO2 BLDV-SCNC: 26 MMOL/L — SIGNIFICANT CHANGE UP (ref 22–26)
CO2 SERPL-SCNC: 19 MMOL/L — LOW (ref 22–31)
CO2 SERPL-SCNC: 20 MMOL/L — LOW (ref 22–31)
COLOR SPEC: YELLOW — SIGNIFICANT CHANGE UP
CREAT SERPL-MCNC: 0.66 MG/DL — SIGNIFICANT CHANGE UP (ref 0.5–1.3)
CREAT SERPL-MCNC: 0.67 MG/DL — SIGNIFICANT CHANGE UP (ref 0.5–1.3)
DIFF PNL FLD: ABNORMAL
EOSINOPHIL # BLD AUTO: 0.01 K/UL — SIGNIFICANT CHANGE UP (ref 0–0.5)
EOSINOPHIL NFR BLD AUTO: 0.1 % — SIGNIFICANT CHANGE UP (ref 0–6)
EPI CELLS # UR: 0 /HPF — SIGNIFICANT CHANGE UP
GAS PNL BLDV: 130 MMOL/L — LOW (ref 136–145)
GAS PNL BLDV: 133 MMOL/L — LOW (ref 136–145)
GAS PNL BLDV: SIGNIFICANT CHANGE UP
GLUCOSE BLDV-MCNC: 130 MG/DL — HIGH (ref 70–99)
GLUCOSE BLDV-MCNC: 135 MG/DL — HIGH (ref 70–99)
GLUCOSE SERPL-MCNC: 117 MG/DL — HIGH (ref 70–99)
GLUCOSE SERPL-MCNC: 121 MG/DL — HIGH (ref 70–99)
GLUCOSE UR QL: NEGATIVE — SIGNIFICANT CHANGE UP
HCO3 BLDV-SCNC: 25 MMOL/L — SIGNIFICANT CHANGE UP (ref 22–29)
HCO3 BLDV-SCNC: 25 MMOL/L — SIGNIFICANT CHANGE UP (ref 22–29)
HCT VFR BLD CALC: 40.7 % — SIGNIFICANT CHANGE UP (ref 39–50)
HCT VFR BLDA CALC: 37 % — LOW (ref 39–51)
HCT VFR BLDA CALC: 40 % — SIGNIFICANT CHANGE UP (ref 39–51)
HGB BLD CALC-MCNC: 12.2 G/DL — LOW (ref 12.6–17.4)
HGB BLD CALC-MCNC: 13.4 G/DL — SIGNIFICANT CHANGE UP (ref 12.6–17.4)
HGB BLD-MCNC: 13.3 G/DL — SIGNIFICANT CHANGE UP (ref 13–17)
IMM GRANULOCYTES NFR BLD AUTO: 0.3 % — SIGNIFICANT CHANGE UP (ref 0–1.5)
INR BLD: 1.11 RATIO — SIGNIFICANT CHANGE UP (ref 0.88–1.16)
KETONES UR-MCNC: ABNORMAL
LACTATE BLDV-MCNC: 1.2 MMOL/L — SIGNIFICANT CHANGE UP (ref 0.7–2)
LACTATE BLDV-MCNC: 1.9 MMOL/L — SIGNIFICANT CHANGE UP (ref 0.7–2)
LEUKOCYTE ESTERASE UR-ACNC: ABNORMAL
LYMPHOCYTES # BLD AUTO: 0.74 K/UL — LOW (ref 1–3.3)
LYMPHOCYTES # BLD AUTO: 4.5 % — LOW (ref 13–44)
MCHC RBC-ENTMCNC: 27.4 PG — SIGNIFICANT CHANGE UP (ref 27–34)
MCHC RBC-ENTMCNC: 32.7 GM/DL — SIGNIFICANT CHANGE UP (ref 32–36)
MCV RBC AUTO: 83.9 FL — SIGNIFICANT CHANGE UP (ref 80–100)
MONOCYTES # BLD AUTO: 1.5 K/UL — HIGH (ref 0–0.9)
MONOCYTES NFR BLD AUTO: 9.2 % — SIGNIFICANT CHANGE UP (ref 2–14)
NEUTROPHILS # BLD AUTO: 13.98 K/UL — HIGH (ref 1.8–7.4)
NEUTROPHILS NFR BLD AUTO: 85.7 % — HIGH (ref 43–77)
NITRITE UR-MCNC: NEGATIVE — SIGNIFICANT CHANGE UP
NRBC # BLD: 0 /100 WBCS — SIGNIFICANT CHANGE UP (ref 0–0)
PCO2 BLDV: 38 MMHG — LOW (ref 42–55)
PCO2 BLDV: 39 MMHG — LOW (ref 42–55)
PH BLDV: 7.42 — SIGNIFICANT CHANGE UP (ref 7.32–7.43)
PH BLDV: 7.42 — SIGNIFICANT CHANGE UP (ref 7.32–7.43)
PH UR: 6.5 — SIGNIFICANT CHANGE UP (ref 5–8)
PLATELET # BLD AUTO: 421 K/UL — HIGH (ref 150–400)
PO2 BLDV: 46 MMHG — HIGH (ref 25–45)
PO2 BLDV: 48 MMHG — HIGH (ref 25–45)
POTASSIUM BLDV-SCNC: 3.8 MMOL/L — SIGNIFICANT CHANGE UP (ref 3.5–5.1)
POTASSIUM BLDV-SCNC: 5.2 MMOL/L — HIGH (ref 3.5–5.1)
POTASSIUM SERPL-MCNC: 4 MMOL/L — SIGNIFICANT CHANGE UP (ref 3.5–5.3)
POTASSIUM SERPL-MCNC: 4.6 MMOL/L — SIGNIFICANT CHANGE UP (ref 3.5–5.3)
POTASSIUM SERPL-SCNC: 4 MMOL/L — SIGNIFICANT CHANGE UP (ref 3.5–5.3)
POTASSIUM SERPL-SCNC: 4.6 MMOL/L — SIGNIFICANT CHANGE UP (ref 3.5–5.3)
PROT SERPL-MCNC: 7.1 G/DL — SIGNIFICANT CHANGE UP (ref 6–8.3)
PROT SERPL-MCNC: 7.8 G/DL — SIGNIFICANT CHANGE UP (ref 6–8.3)
PROT UR-MCNC: ABNORMAL
PROTHROM AB SERPL-ACNC: 13.2 SEC — SIGNIFICANT CHANGE UP (ref 10.6–13.6)
RAPID RVP RESULT: SIGNIFICANT CHANGE UP
RBC # BLD: 4.85 M/UL — SIGNIFICANT CHANGE UP (ref 4.2–5.8)
RBC # FLD: 14.3 % — SIGNIFICANT CHANGE UP (ref 10.3–14.5)
RBC CASTS # UR COMP ASSIST: 43 /HPF — HIGH (ref 0–4)
SAO2 % BLDV: 75.8 % — SIGNIFICANT CHANGE UP (ref 67–88)
SAO2 % BLDV: 80.7 % — SIGNIFICANT CHANGE UP (ref 67–88)
SARS-COV-2 RNA SPEC QL NAA+PROBE: SIGNIFICANT CHANGE UP
SODIUM SERPL-SCNC: 133 MMOL/L — LOW (ref 135–145)
SODIUM SERPL-SCNC: 135 MMOL/L — SIGNIFICANT CHANGE UP (ref 135–145)
SP GR SPEC: 1.01 — SIGNIFICANT CHANGE UP (ref 1.01–1.02)
UROBILINOGEN FLD QL: ABNORMAL
WBC # BLD: 16.32 K/UL — HIGH (ref 3.8–10.5)
WBC # FLD AUTO: 16.32 K/UL — HIGH (ref 3.8–10.5)
WBC UR QL: 222 /HPF — HIGH (ref 0–5)

## 2021-10-16 PROCEDURE — 74176 CT ABD & PELVIS W/O CONTRAST: CPT | Mod: 26,MA

## 2021-10-16 PROCEDURE — 99285 EMERGENCY DEPT VISIT HI MDM: CPT

## 2021-10-16 PROCEDURE — 76775 US EXAM ABDO BACK WALL LIM: CPT | Mod: 26,59

## 2021-10-16 PROCEDURE — 76870 US EXAM SCROTUM: CPT | Mod: 26

## 2021-10-16 PROCEDURE — 99223 1ST HOSP IP/OBS HIGH 75: CPT

## 2021-10-16 PROCEDURE — 93975 VASCULAR STUDY: CPT | Mod: 26

## 2021-10-16 PROCEDURE — 93976 VASCULAR STUDY: CPT | Mod: 26

## 2021-10-16 PROCEDURE — 71045 X-RAY EXAM CHEST 1 VIEW: CPT | Mod: 26

## 2021-10-16 RX ORDER — ENOXAPARIN SODIUM 100 MG/ML
40 INJECTION SUBCUTANEOUS DAILY
Refills: 0 | Status: DISCONTINUED | OUTPATIENT
Start: 2021-10-16 | End: 2021-10-19

## 2021-10-16 RX ORDER — SODIUM CHLORIDE 9 MG/ML
1000 INJECTION, SOLUTION INTRAVENOUS
Refills: 0 | Status: DISCONTINUED | OUTPATIENT
Start: 2021-10-16 | End: 2021-10-19

## 2021-10-16 RX ORDER — SODIUM CHLORIDE 9 MG/ML
1000 INJECTION, SOLUTION INTRAVENOUS ONCE
Refills: 0 | Status: COMPLETED | OUTPATIENT
Start: 2021-10-16 | End: 2021-10-16

## 2021-10-16 RX ORDER — ACETAMINOPHEN 500 MG
650 TABLET ORAL EVERY 6 HOURS
Refills: 0 | Status: DISCONTINUED | OUTPATIENT
Start: 2021-10-16 | End: 2021-10-19

## 2021-10-16 RX ORDER — ONDANSETRON 8 MG/1
4 TABLET, FILM COATED ORAL EVERY 8 HOURS
Refills: 0 | Status: DISCONTINUED | OUTPATIENT
Start: 2021-10-16 | End: 2021-10-19

## 2021-10-16 RX ORDER — TAMSULOSIN HYDROCHLORIDE 0.4 MG/1
0.4 CAPSULE ORAL AT BEDTIME
Refills: 0 | Status: DISCONTINUED | OUTPATIENT
Start: 2021-10-16 | End: 2021-10-19

## 2021-10-16 RX ORDER — INFLUENZA VIRUS VACCINE 15; 15; 15; 15 UG/.5ML; UG/.5ML; UG/.5ML; UG/.5ML
0.5 SUSPENSION INTRAMUSCULAR ONCE
Refills: 0 | Status: DISCONTINUED | OUTPATIENT
Start: 2021-10-16 | End: 2021-10-19

## 2021-10-16 RX ORDER — IBUPROFEN 200 MG
400 TABLET ORAL ONCE
Refills: 0 | Status: COMPLETED | OUTPATIENT
Start: 2021-10-16 | End: 2021-10-16

## 2021-10-16 RX ORDER — PIPERACILLIN AND TAZOBACTAM 4; .5 G/20ML; G/20ML
3.38 INJECTION, POWDER, LYOPHILIZED, FOR SOLUTION INTRAVENOUS ONCE
Refills: 0 | Status: COMPLETED | OUTPATIENT
Start: 2021-10-16 | End: 2021-10-16

## 2021-10-16 RX ORDER — ERTAPENEM SODIUM 1 G/1
1000 INJECTION, POWDER, LYOPHILIZED, FOR SOLUTION INTRAMUSCULAR; INTRAVENOUS EVERY 24 HOURS
Refills: 0 | Status: DISCONTINUED | OUTPATIENT
Start: 2021-10-17 | End: 2021-10-18

## 2021-10-16 RX ORDER — ACETAMINOPHEN 500 MG
650 TABLET ORAL ONCE
Refills: 0 | Status: COMPLETED | OUTPATIENT
Start: 2021-10-16 | End: 2021-10-16

## 2021-10-16 RX ORDER — FINASTERIDE 5 MG/1
5 TABLET, FILM COATED ORAL DAILY
Refills: 0 | Status: DISCONTINUED | OUTPATIENT
Start: 2021-10-16 | End: 2021-10-19

## 2021-10-16 RX ADMIN — Medication 650 MILLIGRAM(S): at 14:56

## 2021-10-16 RX ADMIN — SODIUM CHLORIDE 80 MILLILITER(S): 9 INJECTION, SOLUTION INTRAVENOUS at 21:49

## 2021-10-16 RX ADMIN — Medication 650 MILLIGRAM(S): at 18:19

## 2021-10-16 RX ADMIN — SODIUM CHLORIDE 1000 MILLILITER(S): 9 INJECTION, SOLUTION INTRAVENOUS at 18:21

## 2021-10-16 RX ADMIN — Medication 400 MILLIGRAM(S): at 18:47

## 2021-10-16 RX ADMIN — TAMSULOSIN HYDROCHLORIDE 0.4 MILLIGRAM(S): 0.4 CAPSULE ORAL at 21:49

## 2021-10-16 RX ADMIN — SODIUM CHLORIDE 1000 MILLILITER(S): 9 INJECTION, SOLUTION INTRAVENOUS at 14:58

## 2021-10-16 RX ADMIN — PIPERACILLIN AND TAZOBACTAM 200 GRAM(S): 4; .5 INJECTION, POWDER, LYOPHILIZED, FOR SOLUTION INTRAVENOUS at 14:58

## 2021-10-16 RX ADMIN — Medication 400 MILLIGRAM(S): at 19:14

## 2021-10-16 NOTE — ED PROVIDER NOTE - PHYSICAL EXAMINATION
Objective:    Vitals: Vital Signs Last 24 Hrs  T(C): 38.7 (10-16-21 @ 13:34), Max: 38.7 (10-16-21 @ 13:34)  T(F): 101.7 (10-16-21 @ 13:34), Max: 101.7 (10-16-21 @ 13:34)  HR: 114 (10-16-21 @ 13:34) (114 - 114)  BP: 135/81 (10-16-21 @ 13:34) (135/81 - 135/81)  BP(mean): --  RR: 20 (10-16-21 @ 13:34) (20 - 20)  SpO2: 96% (10-16-21 @ 13:34) (96% - 96%)            I&O's Summary      PHYSICAL EXAM:  GENERAL: NAD, well-groomed, well-developed  HEAD:  Atraumatic, Normocephalic  EYES: EOMI, PERRLA, conjunctiva and sclera clear  ENMT: No tonsillar erythema, exudates, or enlargement; Moist mucous membranes, Good dentition, No lesions  NECK: Supple, No JVD, Normal thyroid  CHEST/LUNG: Clear to auscultation bilaterally; No rales, rhonchi, wheezing, or rubs  HEART: Regular rate and rhythm; No murmurs, rubs, or gallops  ABDOMEN: Soft, tender to right flank, Nondistended; Bowel sounds present  EXTREMITIES:  2+ Peripheral Pulses, No clubbing, cyanosis, or edema  : Right testicle swollen, +cremasteric reflex, erythema, ttp   NERVOUS SYSTEM:  Alert & Oriented X4, Good concentration  PSYCH: Normal Affect. Speaking in Full Sentences. Laying in bed comfortably; not agitated Attending Daria Iverson: Gen: NAD, heent: atrauamtic, eomi, perrla, mmm, op pink, uvula midline, neck; nttp, no nuchal rigidity, chest: nttp, no crepitus, cv: rrr, no murmurs, lungs: ctab, abd: soft, mild ttp RLQ, no peritoneal signs, +BS, no guarding, ext: wwp, neg homans, skin: no rash, neuro: awake and alert, following commands, speech clear, sensation and strength intact, no focal deficits  right testicular swelling and ttp, no crepitus    PHYSICAL EXAM:  GENERAL: NAD, well-groomed, well-developed  HEAD:  Atraumatic, Normocephalic  EYES: EOMI, PERRLA, conjunctiva and sclera clear  ENMT: No tonsillar erythema, exudates, or enlargement; Moist mucous membranes, Good dentition, No lesions  NECK: Supple, No JVD, Normal thyroid  CHEST/LUNG: Clear to auscultation bilaterally; No rales, rhonchi, wheezing, or rubs  HEART: Regular rate and rhythm; No murmurs, rubs, or gallops  ABDOMEN: Soft, tender to right flank, Nondistended; Bowel sounds present  EXTREMITIES:  2+ Peripheral Pulses, No clubbing, cyanosis, or edema  : Right testicle swollen, +cremasteric reflex, erythema, ttp   NERVOUS SYSTEM:  Alert & Oriented X4, Good concentration  PSYCH: Normal Affect. Speaking in Full Sentences. Laying in bed comfortably; not agitated

## 2021-10-16 NOTE — ED ADULT NURSE NOTE - OBJECTIVE STATEMENT
75 y/o male PMHX BPH, HTN, HLD, Urosepsis, presents with complaints of right testicular pain radiating to right lower abdominal area x2 days. pt states he had a fever 2 days ago 101 took tylenol. denies any fever or chills now. denies any nausea vomiting or diarrhea sara. states he had a lot of nausea and vomiting two days ago. denies any burning urination, denies any flank pain. pt states he was admitted her last month for urosepsis and had a stent placed, pt states he was scheduled for surgery for kidney stone removal but his EKG showed extra heart beats. safety precautions in place pt placed on cardiac monitor call bell in reach.

## 2021-10-16 NOTE — ED ADULT NURSE NOTE - NS PRO AD NO ADVANCE DIRECTIVE
[de-identified] : Patient is status post Right knee Arthroscopy on 4-15-21\par Lateral meniscus vertical tear repair and bone marrow vents\par \par \par The patient is doing well with improved pain postoperatively, is not taking narcotics for pain.\par overnight swelling into monday, forgot asa over weekend\par no calf pain to palp, no sob cp\par advised loosen ace and advised ER\par here for eval\par no drainage from any incisions\par \par The patient denies shortness of breath, chest pain, numbness tingling, worsening calf pain or swelling.\par \par Right Lower Ext\par \par mild hyperemia BL leg\par Lateral stitch not fully closed, no drainage\par Incisions are intact\par There is no erythema induration warmth or tenderness about the incisions\par There is small effusion\par Knee ROM is from [ 0 - 100 painless ]\par Motor is intact knee/ankle/toe flexor/extensor\par Sensory intact deep+superficial peroneal/posterior tibial/sural/saphenous\par Palpable DP with brisk capillary refill\par Mild quadriceps muscle weakness and decreased tone\par \par Assessment Plan\par 2+ week status post Lateral meniscus vertical tear repair and bone marrow vents\par \par Continue posteroperative exercises\par Continue compressive dressing and ice for pain and swelling\par \par completed NWB RLE 2 weeks\par transition to wbat at 4 weeks\par \par hold PT\par \par lateral incision not fully closed, no drainage, new steri applied keflex 1 week\par hold showers\par \par lower risk of DVT given age and fam hx and improvement with loosening ace wrap. appears that the ace was too tight.\par \par \par \par Follow up:\par 1 week
No

## 2021-10-16 NOTE — ED PROVIDER NOTE - NS ED ROS FT
REVIEW OF SYSTEMS:  CONSTITUTIONAL: No weakness, fevers, chills, sick contacts, or unintended weight loss  EYES: No visual changes or vertigo  ENT: No throat pain, rhinorrhea, or hearing loss   NECK: No pain or stiffness  RESPIRATORY: No cough, wheezing, hemoptysis; No shortness of breath  CARDIOVASCULAR: No chest pain or palpitations  GASTROINTESTINAL: No abdominal or epigastric pain. No nausea, vomiting, or hematemesis; No diarrhea or constipation. No melena or hematochezia.  GENITOURINARY: +right testicular swelling. +pain  NEUROLOGICAL: No numbness or weakness

## 2021-10-16 NOTE — H&P ADULT - NSHPADDITIONALINFOADULT_GEN_ALL_CORE
Admitted on behalf of Dr. Rancho Gavin  Pager: [phone]  Off-Hours: [phone]  Available on MS Teams

## 2021-10-16 NOTE — ED PROVIDER NOTE - OBJECTIVE STATEMENT
76 year old male with hx of BPH, nephrolithiasis s/p stent here for right testicular pain (x1d) and fevers (x5d). Per patient he was due to have a urologic procedure in 4 days to remove his stent (Dr. Obi Alvarado) and remove the kidney stones and was instructed to come back if he had a fever. He noted 5 days of fever up and down 100.7F. He also noted 1 day of right testicular swelling, redness, and pain. He tried tylenol which mildly helped but he then threw up. No headache, cp,sob, diarrhea, constipation, trauma.

## 2021-10-16 NOTE — H&P ADULT - HISTORY OF PRESENT ILLNESS
76M with recent admission for E. Coli bacteremia and nephrolithiasis requiring right urethral stent presenting with one day history of right testicular pain and swelling. Patient was recently on urology service for which he was treated for E. Coli bacteremia with Zosyn with likely urinary source necessitating a right urethral stent after imaging revealed hydronephrosis and nephrolithiasis. Patient eventually cleared cultures and discharged on 10 days of Bactrim of which he completed. In addition, he has been on Augmentin for 5 days. He was planning to have a cystoscopy, right ureteroscopy, laser lithotripsy and stent placed on 10/20, but unfortunately his right testicle swelled up. He has also been experiencing fever, nausea, vomiting and decrease PO intake. He denies penile discharge. No prior history of gonorrhea or chlamydia. Patient had CT A&P showing mild right hydronephrosis with stent in place and 4 mm distal ureteral calculus. Patient had testicular US to rule out torsion and it showed right epididymoorchitis and bilateral hydroceles. In the ED patient given Zosyn, 2 L LR, Tylenol and Ibuprofen. Patient feeling better after interventions. There is no testicular pain when he doesn't move.

## 2021-10-16 NOTE — CONSULT NOTE ADULT - SUBJECTIVE AND OBJECTIVE BOX
HPI:  76 year old male with hx of BPH, nephrolithiasis (c/b urosepsis w R. JJ stent placement on ) who presents to the ER with persistent fevers and new right testicular pain.  He was recently admitted last month for stent placement and associated urosepsis and E coli UTI/bacteremia, discharged.  Procedure scheduled for 10/20; however, per the patient noted 101F-102F fevers and severe right testicular pain.  He denies any issues with urination, denies n/v, denies flank pain, change in urine output, CP/SOB.        PAST MEDICAL & SURGICAL HISTORY:  BPH (benign prostatic hyperplasia)    Calculus of ureter    Prostate Cancer Screening  biopsy    S/P ureteral stent placement  (2021)    S/P cataract surgery  (Bilateral eyes, )      MEDICATIONS  (STANDING):  levoFLOXacin IVPB 500 milliGRAM(s) IV Intermittent every 24 hours    MEDICATIONS  (PRN):    FAMILY HISTORY:  FH: stomach cancer (Father)    FH: stomach cancer (Sibling)      Allergies    No Known Allergies    Intolerances      SOCIAL HISTORY:   Tobacco hx:    REVIEW OF SYSTEMS: Pertinent positives and negatives as stated in HPI, otherwise negative    Vital signs  T(C): 37.8, Max: 38.7 (10-16 @ 13:34)  HR: 107  BP: 148/88  SpO2: 97%    Output    UOP    Physical Exam  Gen: NAD  Pulm: No respiratory distress, no subcostal retractions  CV: RRR, no JVD  Abd: Soft, NT, ND  : Uncircumcised, no lesions.  No discharge or blood at urethral meatus.  Testes descended bilaterally.  Testes and epididymis nontender bilaterally.  Cremasteric reflex present bilaterally.  MSK: No edema present    LABS:          10-16 @ 15:10    WBC 16.32 / Hct 40.7  / SCr 0.66     10-16    135  |  96  |  9   ----------------------------<  117<H>  4.0   |  19<L>  |  0.66    Ca    9.7      16 Oct 2021 15:10    TPro  7.8  /  Alb  4.0  /  TBili  1.0  /  DBili  x   /  AST  50<H>  /  ALT  74<H>  /  AlkPhos  206<H>  10-16    PT/INR - ( 16 Oct 2021 15:11 )   PT: 13.2 sec;   INR: 1.11 ratio         PTT - ( 16 Oct 2021 15:11 )  PTT:30.3 sec  Urinalysis Basic - ( 16 Oct 2021 15:12 )    Color: Yellow / Appearance: Slightly Turbid / S.012 / pH: x  Gluc: x / Ketone: Small  / Bili: Negative / Urobili: 2 mg/dL   Blood: x / Protein: 30 mg/dL / Nitrite: Negative   Leuk Esterase: Large / RBC: 43 /hpf /  /HPF   Sq Epi: x / Non Sq Epi: 0 /hpf / Bacteria: Moderate        Urine Cx:   Blood Cx:    RADIOLOGY:     HPI:  76 year old male with hx of BPH, nephrolithiasis (c/b urosepsis w R. JJ stent placement on ) who presents to the ER with persistent fevers and new right testicular pain.  He was recently admitted last month for stent placement and associated urosepsis and E coli UTI/bacteremia, discharged.  Procedure scheduled for 10/20; however, per the patient noted 101F-102F fevers and severe right testicular pain.  He denies any issues with urination, denies n/v, denies flank pain, change in urine output, CP/SOB.      In ER, noted bedside testicular pain.  Febrile to 101.7 associated leukocytosis WBC 16.3.      PAST MEDICAL & SURGICAL HISTORY:  BPH (benign prostatic hyperplasia)    Calculus of ureter    Prostate Cancer Screening  biopsy    S/P ureteral stent placement  (2021)    S/P cataract surgery  (Bilateral eyes, )      MEDICATIONS  (STANDING):  levoFLOXacin IVPB 500 milliGRAM(s) IV Intermittent every 24 hours    MEDICATIONS  (PRN):    FAMILY HISTORY:  FH: stomach cancer (Father)    FH: stomach cancer (Sibling)      Allergies    No Known Allergies    Intolerances      SOCIAL HISTORY:   Tobacco hx:    REVIEW OF SYSTEMS: Pertinent positives and negatives as stated in HPI, otherwise negative    Vital signs  T(C): 37.8, Max: 38.7 (10-16 @ 13:34)  HR: 107  BP: 148/88  SpO2: 97%    Output    UOP    Physical Exam  Gen: NAD  Pulm: No respiratory distress, no subcostal retractions  CV: RRR, no JVD  Abd: Soft, NT, ND, no CVAT  : Uncircumcised, no lesions.  No discharge or blood at urethral meatus.  Testes descended bilaterally.  Right testicular induration and epididymal tenderness to palpation. Mild associated hydrocele, no fluctuance/crepitus.       LABS:  10-16 @ 15:10    WBC 16.32 / Hct 40.7  / SCr 0.66     10-16    135  |  96  |  9   ----------------------------<  117<H>  4.0   |  19<L>  |  0.66    Ca    9.7      16 Oct 2021 15:10    TPro  7.8  /  Alb  4.0  /  TBili  1.0  /  DBili  x   /  AST  50<H>  /  ALT  74<H>  /  AlkPhos  206<H>  10-16    PT/INR - ( 16 Oct 2021 15:11 )   PT: 13.2 sec;   INR: 1.11 ratio         PTT - ( 16 Oct 2021 15:11 )  PTT:30.3 sec  Urinalysis Basic - ( 16 Oct 2021 15:12 )    Color: Yellow / Appearance: Slightly Turbid / S.012 / pH: x  Gluc: x / Ketone: Small  / Bili: Negative / Urobili: 2 mg/dL   Blood: x / Protein: 30 mg/dL / Nitrite: Negative   Leuk Esterase: Large / RBC: 43 /hpf /  /HPF   Sq Epi: x / Non Sq Epi: 0 /hpf / Bacteria: Moderate        Urine Cx: pending  Blood Cx: pending    RADIOLOGY:  Pending

## 2021-10-16 NOTE — ED PROVIDER NOTE - CLINICAL SUMMARY MEDICAL DECISION MAKING FREE TEXT BOX
76 year old male with hx of BPH, nephrolithiasis s/p stent here for right testicular pain (x1d) and fevers (x5d) with concern for epididymitis vs. torsion vs. stent/stone infection. Cbc, cmp, cultures, will start zosyn, official testicular ultrasound, Urology consult 76 year old male with hx of BPH, nephrolithiasis s/p stent here for right testicular pain (x1d) and fevers (x5d) with concern for epididymitis vs. torsion vs. stent/stone infection. Cbc, cmp, cultures, will start zosyn, official testicular ultrasound, Urology consult pending 76 year old male with hx of BPH, nephrolithiasis s/p stent here for right testicular pain (x1d) and fevers (x5d) with concern for epididymitis vs. torsion vs. stent/stone infection. Cbc, cmp, cultures, will start zosyn, official testicular ultrasound, Urology consult pending  Attending Daria Iverson: 75 yo male s/p recent stent placement for right uteral stone presenting with right abdominal and testicle pain. pocus performed showing mild right hydro and evidence of right epiddmyitis orchitis. pt initially given abx for concern for stent infection as well as orchitis. cultures sent. dw urology recommend continued abx and admission to medicine.

## 2021-10-16 NOTE — H&P ADULT - NSHPREVIEWOFSYSTEMS_GEN_ALL_CORE
GEN: no night sweats or change in appetite  EYES: no changes in vision or diplopia   ENT: no epistaxis, sinus pain, gingival bleeding, odynophagia or dysphagia  CV: no CP, PND or palpitations  RESP: no cough, wheezing, or hemoptysis  GI: no hematemesis, hematochezia, or melena  : no dysuria, polyuria, or hematuria  MSK: no arthralgias or joint swelling   NEURO: no gross sensory changes, numbness, focal deficits  PSYCH: no depression or changes in concentration  HEME/ONC: no purpura, petechiae or night sweats  SKIN: no pruritus, hair loss or skin lesions  ALL: no photosensitivity, no complaints of anaphylaxis (SOB, throat swelling) GEN: no night sweats; +loss of appetite   EYES: no changes in vision or diplopia   ENT: no epistaxis, sinus pain, gingival bleeding, odynophagia or dysphagia  CV: no CP, PND or palpitations  RESP: no cough, wheezing, or hemoptysis  GI: no hematemesis, hematochezia, or melena; +nausea/vomiting  : no dysuria, polyuria, or hematuria; +testicular swelling  MSK: no arthralgias or joint swelling   NEURO: no gross sensory changes, numbness, focal deficits  PSYCH: no depression or changes in concentration  HEME/ONC: no purpura, petechiae or night sweats  SKIN: no pruritus, hair loss or skin lesions  ALL: no photosensitivity, no complaints of anaphylaxis (SOB, throat swelling)

## 2021-10-16 NOTE — ED PROVIDER NOTE - PROGRESS NOTE DETAILS
Attending Daria Iverson: d/w urology upon arrival will come to evaluate Attending Daria Iverson: rob urology reviewed ct scan and u/s feel fever likely from epididmyorchitis and less likely from stent infection. recommend admisson to medicine. cultures sent

## 2021-10-16 NOTE — ED ADULT NURSE NOTE - DRUG PRE-SCREENING (DAST -1)
Patient: Dulce Pinedo    
Statement Selected

## 2021-10-16 NOTE — ED PROVIDER NOTE - ATTENDING CONTRIBUTION TO CARE
Attending MD Daria Iverson:  I personally have seen and examined this patient.  Resident note reviewed and agree on plan of care and except where noted.  See HPI, PE, and MDM for details.

## 2021-10-16 NOTE — H&P ADULT - NSHPPHYSICALEXAM_GEN_ALL_CORE
T(C): 38.2 (10-16-21 @ 17:30), Max: 38.7 (10-16-21 @ 13:34)  HR: 93 (10-16-21 @ 18:00) (91 - 114)  BP: 145/77 (10-16-21 @ 18:00) (135/81 - 149/81)  RR: 18 (10-16-21 @ 18:00) (18 - 20)  SpO2: 98% (10-16-21 @ 18:00) (96% - 98%)    GEN: (fe)male in NAD, appears comfortable, no diaphoresis  EYES: No scleral injection, PERRL, EOMI  ENTM: neck supple & symmetric without tracheal deviation, moist membranes, no gross hearing impairment, thyroid gland not enlarged  CV: +S1/S2, no m/r/g, no abdominal bruit, no LE edema  RESP: breathing comfortably, no respiratory accessory muscle use, CTAB, no w/r/r  GI: normoactive BS, soft, NTND, no rebounding/guarding, no palpable masses  LYMPHATICS: no LAD or tenderness to palpation  NEURO: AOx3, no focal deficits, CNII-XII grossly intact  PSYCH: No SI/HI/AVH, appropriate affect, appropriate insight/judgment   SKIN: no petechiae, ecchymosis or maculopapular rash noted T(C): 38.2 (10-16-21 @ 17:30), Max: 38.7 (10-16-21 @ 13:34)  HR: 93 (10-16-21 @ 18:00) (91 - 114)  BP: 145/77 (10-16-21 @ 18:00) (135/81 - 149/81)  RR: 18 (10-16-21 @ 18:00) (18 - 20)  SpO2: 98% (10-16-21 @ 18:00) (96% - 98%)    GEN: male in NAD, appears comfortable, no diaphoresis  EYES: No scleral injection, PERRL, EOMI  ENTM: neck supple & symmetric without tracheal deviation, moist membranes, no gross hearing impairment, thyroid gland not enlarged  CV: +S1/S2, no m/r/g, no abdominal bruit, no LE edema  RESP: breathing comfortably, no respiratory accessory muscle use, CTAB, no w/r/r  GI: normoactive BS, soft, NTND, no rebounding/guarding, no palpable masses  LYMPHATICS: no LAD or tenderness to palpation  NEURO: AOx3, no focal deficits, CNII-XII grossly intact  PSYCH: No SI/HI/AVH, appropriate affect, appropriate insight/judgment   SKIN: no petechiae, ecchymosis or maculopapular rash noted  : +right testicle swollen, no redness or warmth noted, tender to touch

## 2021-10-16 NOTE — H&P ADULT - ASSESSMENT
76M with recent admission for E. Coli bacteremia and nephrolithiasis requiring right urethral stent presenting with one day history of right testicular pain and swelling. Patient had testicular US to rule out torsion and it showed right epididymoorchitis and bilateral hydroceles.    #Right Epididymoorchitis   -Most likely bacteria is E. Coli (doubt gonorrhea and chlamydia in this elderly gentleman) and given he has been on Augmentin & this developed will provide Ertapenem  -Follow up blood and urine cultures  -Send urine GC  -Tylenol & Zofran PRN  -Scrotal Support    #Nephrolithiasis  -Will hydrate overnight  -Continue with Flomax for medical expulsion therapy  -Urology recommendations appreciated: likely will have procedure as an outpatient if there is no concern for infected/obstructive nephrolithiasis

## 2021-10-16 NOTE — ED ADULT TRIAGE NOTE - CHIEF COMPLAINT QUOTE
had kidney stones; had urosepsis; now has fever, painful urination, back pain and swollen testicle while on amoxicillin

## 2021-10-17 LAB
BACTERIA UR CULT: ABNORMAL
CULTURE RESULTS: SIGNIFICANT CHANGE UP
SPECIMEN SOURCE: SIGNIFICANT CHANGE UP

## 2021-10-17 PROCEDURE — 93010 ELECTROCARDIOGRAM REPORT: CPT

## 2021-10-17 PROCEDURE — 99231 SBSQ HOSP IP/OBS SF/LOW 25: CPT

## 2021-10-17 RX ORDER — AMOXICILLIN AND CLAVULANATE POTASSIUM 875; 125 MG/1; MG/1
875-125 TABLET, COATED ORAL
Qty: 20 | Refills: 0 | Status: ACTIVE | COMMUNITY
Start: 2021-09-27 | End: 1900-01-01

## 2021-10-17 RX ADMIN — Medication 650 MILLIGRAM(S): at 17:10

## 2021-10-17 RX ADMIN — TAMSULOSIN HYDROCHLORIDE 0.4 MILLIGRAM(S): 0.4 CAPSULE ORAL at 21:13

## 2021-10-17 RX ADMIN — Medication 650 MILLIGRAM(S): at 01:00

## 2021-10-17 RX ADMIN — Medication 650 MILLIGRAM(S): at 00:26

## 2021-10-17 RX ADMIN — ENOXAPARIN SODIUM 40 MILLIGRAM(S): 100 INJECTION SUBCUTANEOUS at 11:34

## 2021-10-17 RX ADMIN — FINASTERIDE 5 MILLIGRAM(S): 5 TABLET, FILM COATED ORAL at 11:34

## 2021-10-17 RX ADMIN — Medication 650 MILLIGRAM(S): at 18:32

## 2021-10-17 RX ADMIN — ERTAPENEM SODIUM 120 MILLIGRAM(S): 1 INJECTION, POWDER, LYOPHILIZED, FOR SOLUTION INTRAMUSCULAR; INTRAVENOUS at 00:17

## 2021-10-17 NOTE — PROGRESS NOTE ADULT - SUBJECTIVE AND OBJECTIVE BOX
Subjective: Pt seen and examined at the bedside. Reports feeling the same. Occasionally with R flank pain when he voids, which he describes as not that bad. R testicle . Denies chest pain, shortness of breath, abd pain, N/V, or other acute complaints at this time.     Objective    Vital signs  T(F): , Max: 101.7 (10-16-21 @ 13:34)  HR: 82 (10-17-21 @ 09:12)  BP: 102/66 (10-17-21 @ 09:12)  SpO2: 98% (10-17-21 @ 09:12)    Output     Gen: NAD  Resp: no resp distress  Abd: soft, nontender, nondistended  : no blood or discharge at urethral meatus. R hemiscrotum swollen and indurated. TTP of R testicle/hemiscrotum. L hemiscrotum soft and nontender.     Labs                          13.3   16.32 )-----------( 421      ( 16 Oct 2021 15:10 )             40.7   10-16    133<L>  |  100  |  9   ----------------------------<  121<H>  4.6   |  20<L>  |  0.67    Ca    9.1      16 Oct 2021 17:52    TPro  7.1  /  Alb  3.2<L>  /  TBili  1.2  /  DBili  x   /  AST  50<H>  /  ALT  65<H>  /  AlkPhos  173<H>  10-16      Urine Cx: pending  Blood Cx: pending    Imaging  < from: US Doppler Scrotum (10.16.21 @ 17:12) >  EXAM:  US DPLX SCROTUM                          EXAM:  US SCROTUM AND CONTENTS                            PROCEDURE DATE:  10/16/2021            INTERPRETATION:  CLINICAL INFORMATION: Right testicular pain. Concern for right hydrocele.    COMPARISON: None available.    TECHNIQUE: Testicular ultrasound utilizing color and spectral Doppler.    FINDINGS:    RIGHT:  Right testis: 3.5 cm x 2.9 cm x  3.0 cm. Normal echogenicity and echotexture with no masses or areas of architectural distortion. Normal arterial and venous blood flow pattern. Hyperemia.  Right epididymis: Enlarged with Hyperemia.  Right hydrocele: Moderate  Right varicocele: None.    LEFT:  Left testis: 3.7 cm x 2.0 cm x 2.8 cm. Normal echogenicity and echotexture with no masses or areas of architectural distortion. Normal arterial and venous blood flow pattern.  Left epididymis: Multiple epididymal cysts. The largest measures 1.5 x 0.9 x 0.9 cm.  Left hydrocele: Small  Left varicocele: None.    IMPRESSION:  Right epididymoorchitis.    Bilateral hydroceles, right greater than left    --- End of Report ---        DEBRA REINOSO MD; Resident Radiologist  This document has been electronically signed.  MARY ANN MÉNDEZ MD; Attending Radiologist  This document has been electronically signed. Oct 16 2021  6:56PM    < end of copied text >

## 2021-10-17 NOTE — PROGRESS NOTE ADULT - ASSESSMENT
A/P: 75yo M BPH, Nephrolithiasis who presents with R. testicular pain, fevers found to have right epididymoorchitis.  Pt recently admitted in September and underwent Rt. JJ stent placement.      - No urgent urologic indication, f/u ER imaging to ensure proper stent placement  - f/u UCx/BCx  - Empiric Abx per prior sensitivities  - Serial scrotal examinations  - scrotal support  - analgesia PRN  - Scheduled for URS and definitive stone/stent mgmt on 10/20 with Dr. Alvarado. Likely will need to reschedule pt's surgery given fevers/infection     A/P: 75yo M BPH, Nephrolithiasis who presents with R. testicular pain, fevers found to have right epididymoorchitis.  Pt recently admitted in September and underwent Rt. JJ stent placement.  CT demonstrates right ureteral stent in place, known 4 mm distal right ureteral stone.     - Continue antibiotics per primary team  - f/u UCx/BCx  - scrotal support  - analgesia PRN  - Scheduled for URS and definitive stone/stent mgmt on 10/20 with Dr. Alvarado. Will need to reschedule pt's surgery given fevers/infection after at least 2 weeks of treatment.     Seen and discussed with Dr. Alvarado.

## 2021-10-17 NOTE — PROGRESS NOTE ADULT - ASSESSMENT
75yo M     h/o  BPH, Nephrolithiasis/   s/p Right  JJ  ureteral  stent 9/21  by urology      * who presents with R. testicular pain, fevers.   pt with   epididymoorchitis  on imaging   also with wbc of  16,000 on arrival   on iv ertapenem  seen by urology,  no intervention  follow  blood/ urine   c/s  *  elevated lfts   ct a/p, normal liver/ normal ducts  follow lfts  in am   on dvt ppx     rad< from: CT Abdomen and Pelvis No Cont (10.16.21 @ 17:15) >  IMPRESSION: Mild right hydronephrosis with stent in place. A 4 mm distal right ureteral calculus.  A 2 cm denzel stone calculus in the bladder.  --- End of Report ---  < end of copied text >        77yo M     h/o  BPH, Nephrolithiasis/   s/p Right  JJ  ureteral  stent 9/21  by urology      * who presents with R. testicular pain, fevers.   pt with   epididymoorchitis  on imaging   also with wbc of  16,000 on arrival   on iv ertapenem  seen by urology,  no intervention  follow  blood/ urine   c/s  *  elevated lfts   ct a/p, normal liver/ normal ducts  follow lfts  in am  abnormal  ekg/ inf  infarct/  card covering for  dr randhawa/   on dvt ppx  pmd  d juliana randhawa     rad< from: CT Abdomen and Pelvis No Cont (10.16.21 @ 17:15) >  IMPRESSION: Mild right hydronephrosis with stent in place. A 4 mm distal right ureteral calculus.  A 2 cm denzel stone calculus in the bladder.  --- End of Report ---  < end of copied text >        77yo M     h/o  BPH, Nephrolithiasis/   s/p Right  JJ  ureteral  stent 9/21  by urology      * who presents with R. testicular pain, fevers.   pt with   epididymoorchitis  on imaging   also with wbc of  16,000 on arrival   on iv ertapenem  seen by urology,  no intervention  follow  blood/ urine   c/s  *  elevated lfts   ct a/p, normal liver/ normal ducts  follow lfts  in am  abnormal  ekg/ inf  infarct/   pt  was  scgeduled  to see card this week for card  clearance  for  upcoming surgery/  card covering for  dr randhawa/   on dvt ppx  pmd  d r kalyn     rad< from: CT Abdomen and Pelvis No Cont (10.16.21 @ 17:15) >  IMPRESSION: Mild right hydronephrosis with stent in place. A 4 mm distal right ureteral calculus.  A 2 cm denzel stone calculus in the bladder.  --- End of Report ---  < end of copied text >

## 2021-10-17 NOTE — CONSULT NOTE ADULT - SUBJECTIVE AND OBJECTIVE BOX
CHIEF COMPLAINT:Patient is a 76y old  Male who presents with a chief complaint of Right Testicle Swelling (17 Oct 2021 09:36)      HPI:  76M with recent admission for E. Coli bacteremia and nephrolithiasis requiring right urethral stent presenting with one day history of right testicular pain and swelling. Patient was recently on urology service for which he was treated for E. Coli bacteremia with Zosyn with likely urinary source necessitating a right urethral stent after imaging revealed hydronephrosis and nephrolithiasis. Patient eventually cleared cultures and discharged on 10 days of Bactrim of which he completed. In addition, he has been on Augmentin for 5 days. He was planning to have a cystoscopy, right ureteroscopy, laser lithotripsy and stent placed on 10/20, but unfortunately his right testicle swelled up. He has also been experiencing fever, nausea, vomiting and decrease PO intake. He denies penile discharge. No prior history of gonorrhea or chlamydia. Patient had CT A&P showing mild right hydronephrosis with stent in place and 4 mm distal ureteral calculus. Patient had testicular US to rule out torsion and it showed right epididymoorchitis and bilateral hydroceles. In the ED patient given Zosyn, 2 L LR, Tylenol and Ibuprofen. Patient feeling better after interventions. There is no testicular pain when he doesn't move.   pt was supposed to see dr Friedman for cardiology clearance with  abnormal ecg.      PAST MEDICAL & SURGICAL HISTORY:  BPH (benign prostatic hyperplasia)    Calculus of ureter    Prostate Cancer Screening  biopsy    S/P ureteral stent placement  (9/4/2021)    S/P cataract surgery  (Bilateral eyes, 2011)        MEDICATIONS  (STANDING):  enoxaparin Injectable 40 milliGRAM(s) SubCutaneous daily  ertapenem  IVPB 1000 milliGRAM(s) IV Intermittent every 24 hours  finasteride 5 milliGRAM(s) Oral daily  influenza   Vaccine 0.5 milliLiter(s) IntraMuscular once  lactated ringers. 1000 milliLiter(s) (80 mL/Hr) IV Continuous <Continuous>  tamsulosin 0.4 milliGRAM(s) Oral at bedtime    MEDICATIONS  (PRN):  acetaminophen   Tablet .. 650 milliGRAM(s) Oral every 6 hours PRN Temp greater or equal to 38C (100.4F), Mild Pain (1 - 3)  ondansetron Injectable 4 milliGRAM(s) IV Push every 8 hours PRN Nausea and/or Vomiting      FAMILY HISTORY:  FH: stomach cancer (Father)    FH: stomach cancer (Sibling)        SOCIAL HISTORY:    [ ] Non-smoker  [ ] Smoker  [ ] Alcohol    Allergies    No Known Allergies    Intolerances    	    REVIEW OF SYSTEMS:  CONSTITUTIONAL: No fever, weight loss, or fatigue  EYES: No eye pain, visual disturbances, or discharge  ENT:  No difficulty hearing, tinnitus, vertigo; No sinus or throat pain  NECK: No pain or stiffness  RESPIRATORY: No cough, wheezing, chills or hemoptysis; No Shortness of Breath  CARDIOVASCULAR: No chest pain, palpitations, passing out, dizziness, or leg swelling  GASTROINTESTINAL: No abdominal or epigastric pain. No nausea, vomiting, or hematemesis; No diarrhea or constipation. No melena or hematochezia.  GENITOURINARY: No dysuria, frequency, hematuria, or incontinence  NEUROLOGICAL: No headaches, memory loss, loss of strength, numbness, or tremors  SKIN: No itching, burning, rashes, or lesions   LYMPH Nodes: No enlarged glands  ENDOCRINE: No heat or cold intolerance; No hair loss  MUSCULOSKELETAL: No joint pain or swelling; No muscle, back, or extremity pain  PSYCHIATRIC: No depression, anxiety, mood swings, or difficulty sleeping  HEME/LYMPH: No easy bruising, or bleeding gums  ALLERGY AND IMMUNOLOGIC: No hives or eczema	    [ ] All others negative	  [ ] Unable to obtain    PHYSICAL EXAM:  T(C): 37.1 (10-17-21 @ 09:12), Max: 38.7 (10-16-21 @ 13:34)  HR: 82 (10-17-21 @ 09:12) (82 - 114)  BP: 102/66 (10-17-21 @ 09:12) (102/66 - 149/81)  RR: 18 (10-17-21 @ 09:12) (18 - 20)  SpO2: 98% (10-17-21 @ 09:12) (95% - 98%)  Wt(kg): --  I&O's Summary      Appearance: Normal	  HEENT:   Normal oral mucosa, PERRL, EOMI	  Lymphatic: No lymphadenopathy  Cardiovascular: Normal S1 S2, No JVD, + murmurs, No edema  Respiratory:rhonchi  Psychiatry: A & O x 3, Mood & affect appropriate  Gastrointestinal:  Soft, Non-tender, + BS	  Skin: No rashes, No ecchymoses, No cyanosis	  Neurologic: Non-focal  Extremities: Normal range of motion, No clubbing, cyanosis or edema  Vascular: Peripheral pulses palpable 2+ bilaterally    TELEMETRY: 	    ECG:  	  RADIOLOGY:  OTHER: 	  	  LABS:	 	    CARDIAC MARKERS:  CARDIAC MARKERS ( 16 Oct 2021 17:52 )  x     / x     / 62 U/L / x     / x                                  13.3   16.32 )-----------( 421      ( 16 Oct 2021 15:10 )             40.7     10-16    133<L>  |  100  |  9   ----------------------------<  121<H>  4.6   |  20<L>  |  0.67    Ca    9.1      16 Oct 2021 17:52    TPro  7.1  /  Alb  3.2<L>  /  TBili  1.2  /  DBili  x   /  AST  50<H>  /  ALT  65<H>  /  AlkPhos  173<H>  10-16    proBNP:   Lipid Profile:   HgA1c:   TSH:   PT/INR - ( 16 Oct 2021 15:11 )   PT: 13.2 sec;   INR: 1.11 ratio         PTT - ( 16 Oct 2021 15:11 )  PTT:30.3 sec    PREVIOUS DIAGNOSTIC TESTING:     < from: 12 Lead ECG (09.05.21 @ 18:16) >  Diagnosis Line SINUS TACHYCARDIA  INFERIOR INFARCT (CITED ON OR BEFORE 11-AUG-2019)  ABNORMAL ECG  WHEN COMPARED WITH ECG OF 11-AUG-2019 05:06,  NO SIGNIFICANT CHANGE WAS FOUND    < from: US Retroperitoneal Limited (ED) (10.16.21 @ 14:22) >  INTERPRETATION:  Interpretation:  Focused grayscale imaging of the kidneys and bladder was performed. The kidneys and bladder were visualized and scanned in both transverse and longitudinal planes.    The right kidney mild hydronephrosis present.  The left kidney no hydronephrosis present.    IMPRESSION:  No hydronephrosis present in the left kidney.  Mild hydronephrosis of the right kidney.    < from: CT Abdomen and Pelvis No Cont (10.16.21 @ 17:15) >  IMPRESSION: Mild right hydronephrosis with stent in place. A 4 mm distal right ureteral calculus.    A 2 cm denzel stone calculus in the bladder.

## 2021-10-18 DIAGNOSIS — A41.9 SEPSIS, UNSPECIFIED ORGANISM: ICD-10-CM

## 2021-10-18 DIAGNOSIS — D72.829 ELEVATED WHITE BLOOD CELL COUNT, UNSPECIFIED: ICD-10-CM

## 2021-10-18 DIAGNOSIS — R50.9 FEVER, UNSPECIFIED: ICD-10-CM

## 2021-10-18 DIAGNOSIS — N45.3 EPIDIDYMO-ORCHITIS: ICD-10-CM

## 2021-10-18 LAB
ALBUMIN SERPL ELPH-MCNC: 3.3 G/DL — SIGNIFICANT CHANGE UP (ref 3.3–5)
ALP SERPL-CCNC: 210 U/L — HIGH (ref 40–120)
ALT FLD-CCNC: 50 U/L — HIGH (ref 10–45)
ANION GAP SERPL CALC-SCNC: 13 MMOL/L — SIGNIFICANT CHANGE UP (ref 5–17)
AST SERPL-CCNC: 31 U/L — SIGNIFICANT CHANGE UP (ref 10–40)
BILIRUB DIRECT SERPL-MCNC: 0.1 MG/DL — SIGNIFICANT CHANGE UP (ref 0–0.2)
BILIRUB INDIRECT FLD-MCNC: 0.3 MG/DL — SIGNIFICANT CHANGE UP (ref 0.2–1)
BILIRUB SERPL-MCNC: 0.4 MG/DL — SIGNIFICANT CHANGE UP (ref 0.2–1.2)
BUN SERPL-MCNC: 10 MG/DL — SIGNIFICANT CHANGE UP (ref 7–23)
C TRACH RRNA SPEC QL NAA+PROBE: SIGNIFICANT CHANGE UP
CALCIUM SERPL-MCNC: 9.4 MG/DL — SIGNIFICANT CHANGE UP (ref 8.4–10.5)
CHLORIDE SERPL-SCNC: 99 MMOL/L — SIGNIFICANT CHANGE UP (ref 96–108)
CHOLEST SERPL-MCNC: 193 MG/DL — SIGNIFICANT CHANGE UP
CO2 SERPL-SCNC: 22 MMOL/L — SIGNIFICANT CHANGE UP (ref 22–31)
CREAT SERPL-MCNC: 0.69 MG/DL — SIGNIFICANT CHANGE UP (ref 0.5–1.3)
GLUCOSE SERPL-MCNC: 116 MG/DL — HIGH (ref 70–99)
HCT VFR BLD CALC: 36.7 % — LOW (ref 39–50)
HDLC SERPL-MCNC: 31 MG/DL — LOW
HGB BLD-MCNC: 11.7 G/DL — LOW (ref 13–17)
LIPID PNL WITH DIRECT LDL SERPL: 138 MG/DL — HIGH
MCHC RBC-ENTMCNC: 27.2 PG — SIGNIFICANT CHANGE UP (ref 27–34)
MCHC RBC-ENTMCNC: 31.9 GM/DL — LOW (ref 32–36)
MCV RBC AUTO: 85.3 FL — SIGNIFICANT CHANGE UP (ref 80–100)
N GONORRHOEA RRNA SPEC QL NAA+PROBE: SIGNIFICANT CHANGE UP
NON HDL CHOLESTEROL: 162 MG/DL — HIGH
NRBC # BLD: 0 /100 WBCS — SIGNIFICANT CHANGE UP (ref 0–0)
PLATELET # BLD AUTO: 407 K/UL — HIGH (ref 150–400)
POTASSIUM SERPL-MCNC: 3.6 MMOL/L — SIGNIFICANT CHANGE UP (ref 3.5–5.3)
POTASSIUM SERPL-SCNC: 3.6 MMOL/L — SIGNIFICANT CHANGE UP (ref 3.5–5.3)
PROT SERPL-MCNC: 7.2 G/DL — SIGNIFICANT CHANGE UP (ref 6–8.3)
RBC # BLD: 4.3 M/UL — SIGNIFICANT CHANGE UP (ref 4.2–5.8)
RBC # FLD: 14.5 % — SIGNIFICANT CHANGE UP (ref 10.3–14.5)
SODIUM SERPL-SCNC: 134 MMOL/L — LOW (ref 135–145)
SPECIMEN SOURCE: SIGNIFICANT CHANGE UP
TRIGL SERPL-MCNC: 120 MG/DL — SIGNIFICANT CHANGE UP
TSH SERPL-MCNC: 1.81 UIU/ML — SIGNIFICANT CHANGE UP (ref 0.27–4.2)
WBC # BLD: 12.71 K/UL — HIGH (ref 3.8–10.5)
WBC # FLD AUTO: 12.71 K/UL — HIGH (ref 3.8–10.5)

## 2021-10-18 PROCEDURE — 99223 1ST HOSP IP/OBS HIGH 75: CPT

## 2021-10-18 PROCEDURE — 93306 TTE W/DOPPLER COMPLETE: CPT | Mod: 26

## 2021-10-18 PROCEDURE — 74183 MRI ABD W/O CNTR FLWD CNTR: CPT | Mod: 26

## 2021-10-18 RX ORDER — CIPROFLOXACIN LACTATE 400MG/40ML
400 VIAL (ML) INTRAVENOUS EVERY 12 HOURS
Refills: 0 | Status: DISCONTINUED | OUTPATIENT
Start: 2021-10-18 | End: 2021-10-19

## 2021-10-18 RX ADMIN — TAMSULOSIN HYDROCHLORIDE 0.4 MILLIGRAM(S): 0.4 CAPSULE ORAL at 21:35

## 2021-10-18 RX ADMIN — Medication 650 MILLIGRAM(S): at 21:35

## 2021-10-18 RX ADMIN — ERTAPENEM SODIUM 120 MILLIGRAM(S): 1 INJECTION, POWDER, LYOPHILIZED, FOR SOLUTION INTRAMUSCULAR; INTRAVENOUS at 00:32

## 2021-10-18 RX ADMIN — Medication 650 MILLIGRAM(S): at 22:35

## 2021-10-18 RX ADMIN — Medication 650 MILLIGRAM(S): at 07:30

## 2021-10-18 RX ADMIN — Medication 650 MILLIGRAM(S): at 06:37

## 2021-10-18 RX ADMIN — ENOXAPARIN SODIUM 40 MILLIGRAM(S): 100 INJECTION SUBCUTANEOUS at 13:59

## 2021-10-18 RX ADMIN — Medication 650 MILLIGRAM(S): at 14:30

## 2021-10-18 RX ADMIN — Medication 200 MILLIGRAM(S): at 18:03

## 2021-10-18 RX ADMIN — Medication 650 MILLIGRAM(S): at 14:01

## 2021-10-18 RX ADMIN — FINASTERIDE 5 MILLIGRAM(S): 5 TABLET, FILM COATED ORAL at 13:58

## 2021-10-18 NOTE — CONSULT NOTE ADULT - SUBJECTIVE AND OBJECTIVE BOX
ZACH WHITE 76y Male  [id number]    Patient is a 76y old  Male who presents with a chief complaint of Right Testicle Swelling (18 Oct 2021 12:29)      HPI:  76M with recent admission for E. Coli bacteremia and nephrolithiasis requiring right urethral stent presenting with one day history of right testicular pain and swelling. Patient was recently on urology service for which he was treated for E. Coli bacteremia with Zosyn with likely urinary source necessitating a right urethral stent after imaging revealed hydronephrosis and nephrolithiasis. Patient eventually cleared cultures and discharged on 10 days of Bactrim of which he completed. In addition, he has been on Augmentin for 5 days. He was planning to have a cystoscopy, right ureteroscopy, laser lithotripsy and stent placed on 10/20, but unfortunately his right testicle swelled up. He has also been experiencing fever, nausea, vomiting and decrease PO intake. He denies penile discharge. No prior history of gonorrhea or chlamydia. Patient had CT A&P showing mild right hydronephrosis with stent in place and 4 mm distal ureteral calculus. Patient had testicular US to rule out torsion and it showed right epididymoorchitis and bilateral hydroceles. In the ED patient given Zosyn, 2 L LR, Tylenol and Ibuprofen. Patient feeling better after interventions. There is no testicular pain when he doesn't move. (16 Oct 2021 19:59)      PAST MEDICAL & SURGICAL HISTORY:  BPH (benign prostatic hyperplasia)    Calculus of ureter    Prostate Cancer Screening  biopsy    S/P ureteral stent placement  (2021)    S/P cataract surgery  (Bilateral eyes, )        Allergies    No Known Allergies    Intolerances        ANTIMICROBIALS:  ciprofloxacin   IVPB 400 every 12 hours      MEDICATIONS  (STANDING):  ciprofloxacin   IVPB 400 milliGRAM(s) IV Intermittent every 12 hours  enoxaparin Injectable 40 milliGRAM(s) SubCutaneous daily  finasteride 5 milliGRAM(s) Oral daily  influenza   Vaccine 0.5 milliLiter(s) IntraMuscular once  lactated ringers. 1000 milliLiter(s) (80 mL/Hr) IV Continuous <Continuous>  tamsulosin 0.4 milliGRAM(s) Oral at bedtime      Social History  Smoking:  Etoh:  Drug use:      FAMILY HISTORY:  FH: stomach cancer (Father)    FH: stomach cancer (Sibling)        Vital Signs Last 24 Hrs  T(C): 36.6 (18 Oct 2021 11:35), Max: 37.4 (18 Oct 2021 05:30)  T(F): 97.9 (18 Oct 2021 11:35), Max: 99.4 (18 Oct 2021 05:30)  HR: 83 (18 Oct 2021 11:35) (75 - 89)  BP: 112/67 (18 Oct 2021 11:35) (112/67 - 130/66)  BP(mean): --  RR: 18 (18 Oct 2021 11:35) (18 - 18)  SpO2: 94% (18 Oct 2021 11:35) (94% - 98%)    CBC Full  -  ( 18 Oct 2021 07:41 )  WBC Count : 12.71 K/uL  RBC Count : 4.30 M/uL  Hemoglobin : 11.7 g/dL  Hematocrit : 36.7 %  Platelet Count - Automated : 407 K/uL  Mean Cell Volume : 85.3 fl  Mean Cell Hemoglobin : 27.2 pg  Mean Cell Hemoglobin Concentration : 31.9 gm/dL  Auto Neutrophil # : x  Auto Lymphocyte # : x  Auto Monocyte # : x  Auto Eosinophil # : x  Auto Basophil # : x  Auto Neutrophil % : x  Auto Lymphocyte % : x  Auto Monocyte % : x  Auto Eosinophil % : x  Auto Basophil % : x    10-18    134<L>  |  99  |  10  ----------------------------<  116<H>  3.6   |  22  |  0.69    Ca    9.4      18 Oct 2021 07:42    TPro  7.2  /  Alb  3.3  /  TBili  0.4  /  DBili  0.1  /  AST  31  /  ALT  50<H>  /  AlkPhos  210<H>  10-18    LIVER FUNCTIONS - ( 18 Oct 2021 07:42 )  Alb: 3.3 g/dL / Pro: 7.2 g/dL / ALK PHOS: 210 U/L / ALT: 50 U/L / AST: 31 U/L / GGT: x           Urinalysis Basic - ( 16 Oct 2021 15:12 )    Color: Yellow / Appearance: Slightly Turbid / S.012 / pH: x  Gluc: x / Ketone: Small  / Bili: Negative / Urobili: 2 mg/dL   Blood: x / Protein: 30 mg/dL / Nitrite: Negative   Leuk Esterase: Large / RBC: 43 /hpf /  /HPF   Sq Epi: x / Non Sq Epi: 0 /hpf / Bacteria: Moderate        MICROBIOLOGY:  .Blood Blood-Peripheral  10-16-21   No growth to date.  --  --      Clean Catch Clean Catch (Midstream)  10-16-21   <10,000 CFU/mL Normal Urogenital Patria  --  --      Clean Catch Clean Catch (Midstream)  10-08-21   No growth  --  --        Rapid RVP Result: NotDetec (10-16 @ 15:11)            RADIOLOGY  < from: CT Abdomen and Pelvis No Cont (10.16.21 @ 17:15) >  IMPRESSION: Mild right hydronephrosis with stent in place. A 4 mm distal right ureteral calculus.    A 2 cm denzel stone calculus in the bladder.    < end of copied text >  < from: US Doppler Scrotum (10.16.21 @ 17:12) >  Right epididymoorchitis.    Bilateral hydroceles, right greater than left    < end of copied text >  < from: Xray Chest 1 View AP/PA (10.16.21 @ 14:32) >  Clear lungs.    < end of copied text >   ZACH WHITE 76y Male  [id number]    Patient is a 76y old  Male who presents with a chief complaint of Right Testicle Swelling (18 Oct 2021 12:29)      HPI:  76M with recent admission for E. Coli bacteremia and nephrolithiasis requiring right urethral stent presenting with one day history of right testicular pain and swelling. Patient was recently on urology service for which he was treated for E. Coli bacteremia with Zosyn with likely urinary source necessitating a right urethral stent after imaging revealed hydronephrosis and nephrolithiasis. Patient eventually cleared cultures and discharged on 10 days of Bactrim of which he completed. In addition, he has been on Augmentin for 5 days. He was planning to have a cystoscopy, right ureteroscopy, laser lithotripsy and stent placed on 10/20, but unfortunately his right testicle swelled up. He has also been experiencing fever, nausea, vomiting and decrease PO intake. He denies penile discharge. No prior history of gonorrhea or chlamydia. Patient had CT A&P showing mild right hydronephrosis with stent in place and 4 mm distal ureteral calculus. Patient had testicular US to rule out torsion and it showed right epididymoorchitis and bilateral hydroceles. In the ED patient given Zosyn, 2 L LR, Tylenol and Ibuprofen. Patient feeling better after interventions. There is no testicular pain when he doesn't move. (16 Oct 2021 19:59)      PAST MEDICAL & SURGICAL HISTORY:  BPH (benign prostatic hyperplasia)    Calculus of ureter    Prostate Cancer Screening  biopsy    S/P ureteral stent placement  (2021)    S/P cataract surgery  (Bilateral eyes, )        Allergies    No Known Allergies    Intolerances        ANTIMICROBIALS:  ciprofloxacin   IVPB 400 every 12 hours      MEDICATIONS  (STANDING):  ciprofloxacin   IVPB 400 milliGRAM(s) IV Intermittent every 12 hours  enoxaparin Injectable 40 milliGRAM(s) SubCutaneous daily  finasteride 5 milliGRAM(s) Oral daily  influenza   Vaccine 0.5 milliLiter(s) IntraMuscular once  lactated ringers. 1000 milliLiter(s) (80 mL/Hr) IV Continuous <Continuous>  tamsulosin 0.4 milliGRAM(s) Oral at bedtime      Social History  Smoking: no  Etoh: no  Drug use: no      FAMILY HISTORY:  FH: stomach cancer (Father)    FH: stomach cancer (Sibling)        Vital Signs Last 24 Hrs  T(C): 36.6 (18 Oct 2021 11:35), Max: 37.4 (18 Oct 2021 05:30)  T(F): 97.9 (18 Oct 2021 11:35), Max: 99.4 (18 Oct 2021 05:30)  HR: 83 (18 Oct 2021 11:35) (75 - 89)  BP: 112/67 (18 Oct 2021 11:35) (112/67 - 130/66)  BP(mean): --  RR: 18 (18 Oct 2021 11:35) (18 - 18)  SpO2: 94% (18 Oct 2021 11:35) (94% - 98%)    CBC Full  -  ( 18 Oct 2021 07:41 )  WBC Count : 12.71 K/uL  RBC Count : 4.30 M/uL  Hemoglobin : 11.7 g/dL  Hematocrit : 36.7 %  Platelet Count - Automated : 407 K/uL  Mean Cell Volume : 85.3 fl  Mean Cell Hemoglobin : 27.2 pg  Mean Cell Hemoglobin Concentration : 31.9 gm/dL  Auto Neutrophil # : x  Auto Lymphocyte # : x  Auto Monocyte # : x  Auto Eosinophil # : x  Auto Basophil # : x  Auto Neutrophil % : x  Auto Lymphocyte % : x  Auto Monocyte % : x  Auto Eosinophil % : x  Auto Basophil % : x    10-18    134<L>  |  99  |  10  ----------------------------<  116<H>  3.6   |  22  |  0.69    Ca    9.4      18 Oct 2021 07:42    TPro  7.2  /  Alb  3.3  /  TBili  0.4  /  DBili  0.1  /  AST  31  /  ALT  50<H>  /  AlkPhos  210<H>  10-18    LIVER FUNCTIONS - ( 18 Oct 2021 07:42 )  Alb: 3.3 g/dL / Pro: 7.2 g/dL / ALK PHOS: 210 U/L / ALT: 50 U/L / AST: 31 U/L / GGT: x           Urinalysis Basic - ( 16 Oct 2021 15:12 )    Color: Yellow / Appearance: Slightly Turbid / S.012 / pH: x  Gluc: x / Ketone: Small  / Bili: Negative / Urobili: 2 mg/dL   Blood: x / Protein: 30 mg/dL / Nitrite: Negative   Leuk Esterase: Large / RBC: 43 /hpf /  /HPF   Sq Epi: x / Non Sq Epi: 0 /hpf / Bacteria: Moderate        MICROBIOLOGY:  .Blood Blood-Peripheral  10-16-21   No growth to date.  --  --      Clean Catch Clean Catch (Midstream)  10-16-21   <10,000 CFU/mL Normal Urogenital Patria  --  --      Clean Catch Clean Catch (Midstream)  10-08-21   No growth  --  --        Rapid RVP Result: NotDetec (10-16 @ 15:11)            RADIOLOGY  < from: CT Abdomen and Pelvis No Cont (10.16.21 @ 17:15) >  IMPRESSION: Mild right hydronephrosis with stent in place. A 4 mm distal right ureteral calculus.    A 2 cm denzel stone calculus in the bladder.    < end of copied text >  < from: US Doppler Scrotum (10.16.21 @ 17:12) >  Right epididymoorchitis.    Bilateral hydroceles, right greater than left    < end of copied text >  < from: Xray Chest 1 View AP/PA (10.16.21 @ 14:32) >  Clear lungs.    < end of copied text >

## 2021-10-18 NOTE — PROGRESS NOTE ADULT - SUBJECTIVE AND OBJECTIVE BOX
afebrile    REVIEW OF SYSTEMS:  GEN: no fever,    no chills  RESP: no SOB,   no cough  CVS: no chest pain,   no palpitations  GI: no abdominal pain,   no nausea,   no vomiting,   no constipation,   no diarrhea  : no dysuria,   no frequency  NEURO: no headache,   no dizziness  PSYCH: no depression,   not anxious  Derm : no rash    MEDICATIONS  (STANDING):  enoxaparin Injectable 40 milliGRAM(s) SubCutaneous daily  ertapenem  IVPB 1000 milliGRAM(s) IV Intermittent every 24 hours  finasteride 5 milliGRAM(s) Oral daily  influenza   Vaccine 0.5 milliLiter(s) IntraMuscular once  lactated ringers. 1000 milliLiter(s) (80 mL/Hr) IV Continuous <Continuous>  tamsulosin 0.4 milliGRAM(s) Oral at bedtime    MEDICATIONS  (PRN):  acetaminophen   Tablet .. 650 milliGRAM(s) Oral every 6 hours PRN Temp greater or equal to 38C (100.4F), Mild Pain (1 - 3)  ondansetron Injectable 4 milliGRAM(s) IV Push every 8 hours PRN Nausea and/or Vomiting      Vital Signs Last 24 Hrs  T(C): 37.4 (18 Oct 2021 05:30), Max: 37.4 (18 Oct 2021 05:30)  T(F): 99.4 (18 Oct 2021 05:30), Max: 99.4 (18 Oct 2021 05:30)  HR: 75 (18 Oct 2021 05:30) (75 - 98)  BP: 118/74 (18 Oct 2021 05:30) (102/66 - 130/66)  BP(mean): --  RR: 18 (18 Oct 2021 05:30) (18 - 18)  SpO2: 98% (18 Oct 2021 05:30) (94% - 98%)  CAPILLARY BLOOD GLUCOSE        I&O's Summary    17 Oct 2021 07:01  -  18 Oct 2021 07:00  --------------------------------------------------------  IN: 890 mL / OUT: 1550 mL / NET: -660 mL        PHYSICAL EXAM:  HEAD:  Atraumatic, Normocephalic  NECK: Supple, No   JVD  CHEST/LUNG:   no     rales,     no,    rhonchi  HEART: Regular rate and rhythm;         murmur  ABDOMEN: Soft, Nontender, ;   EXTREMITIES:    no   edema  NEUROLOGY:  alert    LABS:                        11.7   12.71 )-----------( 407      ( 18 Oct 2021 07:41 )             36.7     10-18    134<L>  |  99  |  10  ----------------------------<  116<H>  3.6   |  22  |  0.69    Ca    9.4      18 Oct 2021 07:42    TPro  7.2  /  Alb  3.3  /  TBili  0.4  /  DBili  0.1  /  AST  31  /  ALT  50<H>  /  AlkPhos  210<H>  10    PT/INR - ( 16 Oct 2021 15:11 )   PT: 13.2 sec;   INR: 1.11 ratio         PTT - ( 16 Oct 2021 15:11 )  PTT:30.3 sec  CARDIAC MARKERS ( 16 Oct 2021 17:52 )  x     / x     / 62 U/L / x     / x          Urinalysis Basic - ( 16 Oct 2021 15:12 )    Color: Yellow / Appearance: Slightly Turbid / S.012 / pH: x  Gluc: x / Ketone: Small  / Bili: Negative / Urobili: 2 mg/dL   Blood: x / Protein: 30 mg/dL / Nitrite: Negative   Leuk Esterase: Large / RBC: 43 /hpf /  /HPF   Sq Epi: x / Non Sq Epi: 0 /hpf / Bacteria: Moderate          10-16 @ 17:52  3.8  48              Consultant(s) Notes Reviewed:      Care Discussed with Consultants/Other Providers:

## 2021-10-18 NOTE — PROGRESS NOTE ADULT - ASSESSMENT
75yo M     h/o  BPH, Nephrolithiasis/   s/p Right  JJ  ureteral  stent 9/21  by urology      * who presents with R. testicular pain, fevers.   pt with   epididymoorchitis  on imaging   also with wbc of  16,000 on arrival   on iv ertapenem  seen by urology,  no intervention    *  elevated lfts   ct a/p, normal liver/ normal ducts  await MRCP  abnormal  ekg/ inf  infarct/   pt  was  scgeduled  to see card this week for card  clearance  for  upcoming surgery/  card covering for  dr randhawa/   on dvt ppx  blood  c/s, negative  pmd  d r spindller     rad< from: CT Abdomen and Pelvis No Cont (10.16.21 @ 17:15) >  IMPRESSION: Mild right hydronephrosis with stent in place. A 4 mm distal right ureteral calculus.  A 2 cm denzel stone calculus in the bladder.  --- End of Report ---  < end of copied text >

## 2021-10-18 NOTE — CONSULT NOTE ADULT - PROBLEM SELECTOR RECOMMENDATION 9
-from epididymoorchitis  -likely from recent E. coli UTI  -ciprofloxacin 400 mg iv q12  -dc invanz   -monitor temps and wbc   -appreciate  input

## 2021-10-18 NOTE — PROGRESS NOTE ADULT - ASSESSMENT
A/P: 75yo M BPH, Nephrolithiasis who presents with R. testicular pain, fevers found to have right epididymoorchitis.  Pt recently admitted in September and underwent Rt. JJ stent placement.  CT demonstrates right ureteral stent in place, known 4 mm distal right ureteral stone.     - Continue antibiotics per primary team  - f/u UCx/BCx  - scrotal support  - analgesia PRN  - Scheduled for URS and definitive stone/stent mgmt on 10/20 with Dr. Alvarado. Will need to reschedule pt's surgery given fevers/infection for at 2 weeks of following completion of antibiotic treatment.

## 2021-10-18 NOTE — PROGRESS NOTE ADULT - SUBJECTIVE AND OBJECTIVE BOX
Subjective    Objective    Vital signs  T(F): , Max: 99.4 (10-18-21 @ 05:30)  HR: 83 (10-18-21 @ 11:35)  BP: 112/67 (10-18-21 @ 11:35)  SpO2: 94% (10-18-21 @ 11:35)  Wt(kg): --    Output     OUT:    Voided (mL): 1550 mL  Total OUT: 1550 mL    Total NET: -1550 mL      OUT:    Voided (mL): 600 mL  Total OUT: 600 mL    Total NET: -600 mL          Physical Exam  Gen  Abd      Labs      10-18 @ 07:42    WBC --    / Hct --    / SCr 0.69     10-18 @ 07:41    WBC 12.71 / Hct 36.7  / SCr --         Urine Cx: ?  Blood Cx: ?    Imaging Subjective    Seen and examined.  Feeling ok, pain and swelling improving on abx.    Objective    Vital signs  T(F): , Max: 99.4 (10-18-21 @ 05:30)  HR: 83 (10-18-21 @ 11:35)  BP: 112/67 (10-18-21 @ 11:35)  SpO2: 94% (10-18-21 @ 11:35)  Wt(kg): --    Output     OUT:    Voided (mL): 1550 mL  Total OUT: 1550 mL    Total NET: -1550 mL      OUT:    Voided (mL): 600 mL  Total OUT: 600 mL    Total NET: -600 mL          Physical Exam  Gen: NAD  Abd: soft NT ND  : right hemiscrotum somewhat swollen, TTP    Labs      10-18 @ 07:42    WBC --    / Hct --    / SCr 0.69     10-18 @ 07:41    WBC 12.71 / Hct 36.7  / SCr --         Urine Cx:   Culture - Urine (10.16.21 @ 19:05)    Specimen Source: Clean Catch Clean Catch (Midstream)    Culture Results:   <10,000 CFU/mL Normal Urogenital Patria      Blood Cx:   Culture - Blood (10.16.21 @ 19:17)    Specimen Source: .Blood Blood-Peripheral    Culture Results:   No growth to date.

## 2021-10-18 NOTE — PROGRESS NOTE ADULT - SUBJECTIVE AND OBJECTIVE BOX
CARDIOLOGY     PROGRESS  NOTE   ________________________________________________    CHIEF COMPLAINT:Patient is a 76y old  Male who presents with a chief complaint of Right Testicle Swelling (18 Oct 2021 08:39)  doing better.  	  REVIEW OF SYSTEMS:  CONSTITUTIONAL: No fever, weight loss, or fatigue  EYES: No eye pain, visual disturbances, or discharge  ENT:  No difficulty hearing, tinnitus, vertigo; No sinus or throat pain  NECK: No pain or stiffness  RESPIRATORY: No cough, wheezing, chills or hemoptysis; No Shortness of Breath  CARDIOVASCULAR: No chest pain, palpitations, passing out, dizziness, or leg swelling  GASTROINTESTINAL: No abdominal or epigastric pain. No nausea, vomiting, or hematemesis; No diarrhea or constipation. No melena or hematochezia.  GENITOURINARY: No dysuria, frequency, hematuria, or incontinence  NEUROLOGICAL: No headaches, memory loss, loss of strength, numbness, or tremors  SKIN: No itching, burning, rashes, or lesions   LYMPH Nodes: No enlarged glands  ENDOCRINE: No heat or cold intolerance; No hair loss  MUSCULOSKELETAL: No joint pain or swelling; No muscle, back, or extremity pain  PSYCHIATRIC: No depression, anxiety, mood swings, or difficulty sleeping  HEME/LYMPH: No easy bruising, or bleeding gums  ALLERGY AND IMMUNOLOGIC: No hives or eczema	    [ ] All others negative	  [ ] Unable to obtain    PHYSICAL EXAM:  T(C): 37.4 (10-18-21 @ 05:30), Max: 37.4 (10-18-21 @ 05:30)  HR: 75 (10-18-21 @ 05:30) (75 - 98)  BP: 118/74 (10-18-21 @ 05:30) (102/66 - 130/66)  RR: 18 (10-18-21 @ 05:30) (18 - 18)  SpO2: 98% (10-18-21 @ 05:30) (94% - 98%)  Wt(kg): --  I&O's Summary    17 Oct 2021 07:01  -  18 Oct 2021 07:00  --------------------------------------------------------  IN: 890 mL / OUT: 1550 mL / NET: -660 mL        Appearance: Normal	  HEENT:   Normal oral mucosa, PERRL, EOMI	  Lymphatic: No lymphadenopathy  Cardiovascular: Normal S1 S2, No JVD, + murmurs, No edema  Respiratory: Lungs clear to auscultation	  Psychiatry: A & O x 3, Mood & affect appropriate  Gastrointestinal:  Soft, Non-tender, + BS	  Skin: No rashes, No ecchymoses, No cyanosis	  Neurologic: Non-focal  Extremities: Normal range of motion, No clubbing, cyanosis or edema  Vascular: Peripheral pulses palpable 2+ bilaterally    MEDICATIONS  (STANDING):  enoxaparin Injectable 40 milliGRAM(s) SubCutaneous daily  ertapenem  IVPB 1000 milliGRAM(s) IV Intermittent every 24 hours  finasteride 5 milliGRAM(s) Oral daily  influenza   Vaccine 0.5 milliLiter(s) IntraMuscular once  lactated ringers. 1000 milliLiter(s) (80 mL/Hr) IV Continuous <Continuous>  tamsulosin 0.4 milliGRAM(s) Oral at bedtime      TELEMETRY: 	    ECG:  	  RADIOLOGY:  OTHER: 	  	  LABS:	 	    CARDIAC MARKERS:  CARDIAC MARKERS ( 16 Oct 2021 17:52 )  x     / x     / 62 U/L / x     / x                                    11.7   12.71 )-----------( 407      ( 18 Oct 2021 07:41 )             36.7     10-18    134<L>  |  99  |  10  ----------------------------<  116<H>  3.6   |  22  |  0.69    Ca    9.4      18 Oct 2021 07:42    TPro  7.2  /  Alb  3.3  /  TBili  0.4  /  DBili  0.1  /  AST  31  /  ALT  50<H>  /  AlkPhos  210<H>  10-18    proBNP:   Lipid Profile:   HgA1c:   TSH:   PT/INR - ( 16 Oct 2021 15:11 )   PT: 13.2 sec;   INR: 1.11 ratio         PTT - ( 16 Oct 2021 15:11 )  PTT:30.3 sec  < from: 12 Lead ECG (09.05.21 @ 18:16) >  Diagnosis Line SINUS TACHYCARDIA  INFERIOR INFARCT (CITED ON OR BEFORE 11-AUG-2019)  ABNORMAL ECG  WHEN COMPARED WITH ECG OF 11-AUG-2019 05:06,  NO SIGNIFICANT CHANGE WAS FOUND      Assessment and plan  ---------------------------  76M with recent admission for E. Coli bacteremia and nephrolithiasis requiring right urethral stent presenting with one day history of right testicular pain and swelling. Patient was recently on urology service for which he was treated for E. Coli bacteremia with Zosyn with likely urinary source necessitating a right urethral stent after imaging revealed hydronephrosis and nephrolithiasis. Patient eventually cleared cultures and discharged on 10 days of Bactrim of which he completed. In addition, he has been on Augmentin for 5 days. He was planning to have a cystoscopy, right ureteroscopy, laser lithotripsy and stent placed on 10/20, but unfortunately his right testicle swelled up. He has also been experiencing fever, nausea, vomiting and decrease PO intake. He denies penile discharge. No prior history of gonorrhea or chlamydia. Patient had CT A&P showing mild right hydronephrosis with stent in place and 4 mm distal ureteral calculus. Patient had testicular US to rule out torsion and it showed right epididymoorchitis and bilateral hydroceles. In the ED patient given Zosyn, 2 L LR, Tylenol and Ibuprofen. Patient feeling better after interventions. There is no testicular pain when he doesn't move.   pt was supposed to see dr Friedman for cardiology clearance with  abnormal ecg.  pt denies of any cardiac symptoms but not sig activity  check echo for wall motion  DVT prophylaxis  will repeat ECG, awaiting old ecg noted with inferior wall mi  lipid panel  abx

## 2021-10-19 ENCOUNTER — TRANSCRIPTION ENCOUNTER (OUTPATIENT)
Age: 76
End: 2021-10-19

## 2021-10-19 VITALS
OXYGEN SATURATION: 97 % | DIASTOLIC BLOOD PRESSURE: 86 MMHG | HEART RATE: 97 BPM | SYSTOLIC BLOOD PRESSURE: 148 MMHG | RESPIRATION RATE: 18 BRPM | TEMPERATURE: 98 F

## 2021-10-19 LAB
ANION GAP SERPL CALC-SCNC: 12 MMOL/L — SIGNIFICANT CHANGE UP (ref 5–17)
BUN SERPL-MCNC: 10 MG/DL — SIGNIFICANT CHANGE UP (ref 7–23)
CALCIUM SERPL-MCNC: 9.2 MG/DL — SIGNIFICANT CHANGE UP (ref 8.4–10.5)
CHLORIDE SERPL-SCNC: 99 MMOL/L — SIGNIFICANT CHANGE UP (ref 96–108)
CO2 SERPL-SCNC: 22 MMOL/L — SIGNIFICANT CHANGE UP (ref 22–31)
CREAT SERPL-MCNC: 0.63 MG/DL — SIGNIFICANT CHANGE UP (ref 0.5–1.3)
GLUCOSE SERPL-MCNC: 152 MG/DL — HIGH (ref 70–99)
HCT VFR BLD CALC: 37 % — LOW (ref 39–50)
HGB BLD-MCNC: 12.1 G/DL — LOW (ref 13–17)
MCHC RBC-ENTMCNC: 27.5 PG — SIGNIFICANT CHANGE UP (ref 27–34)
MCHC RBC-ENTMCNC: 32.7 GM/DL — SIGNIFICANT CHANGE UP (ref 32–36)
MCV RBC AUTO: 84.1 FL — SIGNIFICANT CHANGE UP (ref 80–100)
NRBC # BLD: 0 /100 WBCS — SIGNIFICANT CHANGE UP (ref 0–0)
PLATELET # BLD AUTO: 437 K/UL — HIGH (ref 150–400)
POTASSIUM SERPL-MCNC: 3.5 MMOL/L — SIGNIFICANT CHANGE UP (ref 3.5–5.3)
POTASSIUM SERPL-SCNC: 3.5 MMOL/L — SIGNIFICANT CHANGE UP (ref 3.5–5.3)
RBC # BLD: 4.4 M/UL — SIGNIFICANT CHANGE UP (ref 4.2–5.8)
RBC # FLD: 14.3 % — SIGNIFICANT CHANGE UP (ref 10.3–14.5)
SODIUM SERPL-SCNC: 133 MMOL/L — LOW (ref 135–145)
WBC # BLD: 9.08 K/UL — SIGNIFICANT CHANGE UP (ref 3.8–10.5)
WBC # FLD AUTO: 9.08 K/UL — SIGNIFICANT CHANGE UP (ref 3.8–10.5)

## 2021-10-19 PROCEDURE — 83605 ASSAY OF LACTIC ACID: CPT

## 2021-10-19 PROCEDURE — 85730 THROMBOPLASTIN TIME PARTIAL: CPT

## 2021-10-19 PROCEDURE — 93975 VASCULAR STUDY: CPT

## 2021-10-19 PROCEDURE — 36415 COLL VENOUS BLD VENIPUNCTURE: CPT

## 2021-10-19 PROCEDURE — 80061 LIPID PANEL: CPT

## 2021-10-19 PROCEDURE — 93306 TTE W/DOPPLER COMPLETE: CPT

## 2021-10-19 PROCEDURE — 99232 SBSQ HOSP IP/OBS MODERATE 35: CPT

## 2021-10-19 PROCEDURE — 82947 ASSAY GLUCOSE BLOOD QUANT: CPT

## 2021-10-19 PROCEDURE — 0225U NFCT DS DNA&RNA 21 SARSCOV2: CPT

## 2021-10-19 PROCEDURE — 87040 BLOOD CULTURE FOR BACTERIA: CPT

## 2021-10-19 PROCEDURE — 84443 ASSAY THYROID STIM HORMONE: CPT

## 2021-10-19 PROCEDURE — 82803 BLOOD GASES ANY COMBINATION: CPT

## 2021-10-19 PROCEDURE — 82550 ASSAY OF CK (CPK): CPT

## 2021-10-19 PROCEDURE — 81001 URINALYSIS AUTO W/SCOPE: CPT

## 2021-10-19 PROCEDURE — 87086 URINE CULTURE/COLONY COUNT: CPT

## 2021-10-19 PROCEDURE — 85025 COMPLETE CBC W/AUTO DIFF WBC: CPT

## 2021-10-19 PROCEDURE — 76870 US EXAM SCROTUM: CPT

## 2021-10-19 PROCEDURE — 76775 US EXAM ABDO BACK WALL LIM: CPT

## 2021-10-19 PROCEDURE — 93005 ELECTROCARDIOGRAM TRACING: CPT

## 2021-10-19 PROCEDURE — 85027 COMPLETE CBC AUTOMATED: CPT

## 2021-10-19 PROCEDURE — 93976 VASCULAR STUDY: CPT

## 2021-10-19 PROCEDURE — 80076 HEPATIC FUNCTION PANEL: CPT

## 2021-10-19 PROCEDURE — 80048 BASIC METABOLIC PNL TOTAL CA: CPT

## 2021-10-19 PROCEDURE — 85014 HEMATOCRIT: CPT

## 2021-10-19 PROCEDURE — 85610 PROTHROMBIN TIME: CPT

## 2021-10-19 PROCEDURE — 74176 CT ABD & PELVIS W/O CONTRAST: CPT | Mod: MA

## 2021-10-19 PROCEDURE — A9585: CPT

## 2021-10-19 PROCEDURE — 99285 EMERGENCY DEPT VISIT HI MDM: CPT | Mod: 25

## 2021-10-19 PROCEDURE — 82330 ASSAY OF CALCIUM: CPT

## 2021-10-19 PROCEDURE — 74183 MRI ABD W/O CNTR FLWD CNTR: CPT

## 2021-10-19 PROCEDURE — 87591 N.GONORRHOEAE DNA AMP PROB: CPT

## 2021-10-19 PROCEDURE — 71045 X-RAY EXAM CHEST 1 VIEW: CPT

## 2021-10-19 PROCEDURE — 82435 ASSAY OF BLOOD CHLORIDE: CPT

## 2021-10-19 PROCEDURE — 80053 COMPREHEN METABOLIC PANEL: CPT

## 2021-10-19 PROCEDURE — 84132 ASSAY OF SERUM POTASSIUM: CPT

## 2021-10-19 PROCEDURE — 87491 CHLMYD TRACH DNA AMP PROBE: CPT

## 2021-10-19 PROCEDURE — 85018 HEMOGLOBIN: CPT

## 2021-10-19 PROCEDURE — 96374 THER/PROPH/DIAG INJ IV PUSH: CPT

## 2021-10-19 PROCEDURE — 84295 ASSAY OF SERUM SODIUM: CPT

## 2021-10-19 PROCEDURE — 96375 TX/PRO/DX INJ NEW DRUG ADDON: CPT

## 2021-10-19 RX ORDER — CIPROFLOXACIN LACTATE 400MG/40ML
1 VIAL (ML) INTRAVENOUS
Qty: 20 | Refills: 0
Start: 2021-10-19 | End: 2021-10-28

## 2021-10-19 RX ORDER — CIPROFLOXACIN LACTATE 400MG/40ML
500 VIAL (ML) INTRAVENOUS EVERY 12 HOURS
Refills: 0 | Status: DISCONTINUED | OUTPATIENT
Start: 2021-10-19 | End: 2021-10-19

## 2021-10-19 RX ADMIN — Medication 200 MILLIGRAM(S): at 05:33

## 2021-10-19 RX ADMIN — Medication 650 MILLIGRAM(S): at 05:34

## 2021-10-19 RX ADMIN — FINASTERIDE 5 MILLIGRAM(S): 5 TABLET, FILM COATED ORAL at 12:04

## 2021-10-19 RX ADMIN — Medication 650 MILLIGRAM(S): at 06:04

## 2021-10-19 RX ADMIN — ENOXAPARIN SODIUM 40 MILLIGRAM(S): 100 INJECTION SUBCUTANEOUS at 12:05

## 2021-10-19 RX ADMIN — Medication 500 MILLIGRAM(S): at 17:56

## 2021-10-19 NOTE — PROGRESS NOTE ADULT - SUBJECTIVE AND OBJECTIVE BOX
afebrile    REVIEW OF SYSTEMS:  GEN: no fever,    no chills  RESP: no SOB,   no cough  CVS: no chest pain,   no palpitations  GI: no abdominal pain,   no nausea,   no vomiting,   no constipation,   no diarrhea  : no dysuria,   no frequency  NEURO: no headache,   no dizziness  PSYCH: no depression,   not anxious  Derm : no rash    MEDICATIONS  (STANDING):  ciprofloxacin   IVPB 400 milliGRAM(s) IV Intermittent every 12 hours  enoxaparin Injectable 40 milliGRAM(s) SubCutaneous daily  finasteride 5 milliGRAM(s) Oral daily  influenza   Vaccine 0.5 milliLiter(s) IntraMuscular once  lactated ringers. 1000 milliLiter(s) (80 mL/Hr) IV Continuous <Continuous>  tamsulosin 0.4 milliGRAM(s) Oral at bedtime    MEDICATIONS  (PRN):  acetaminophen   Tablet .. 650 milliGRAM(s) Oral every 6 hours PRN Temp greater or equal to 38C (100.4F), Mild Pain (1 - 3)  ondansetron Injectable 4 milliGRAM(s) IV Push every 8 hours PRN Nausea and/or Vomiting      Vital Signs Last 24 Hrs  T(C): 36.8 (19 Oct 2021 04:30), Max: 37.4 (18 Oct 2021 18:50)  T(F): 98.2 (19 Oct 2021 04:30), Max: 99.3 (18 Oct 2021 18:50)  HR: 79 (19 Oct 2021 04:30) (79 - 87)  BP: 130/67 (19 Oct 2021 04:30) (128/75 - 130/67)  BP(mean): --  RR: 18 (19 Oct 2021 04:30) (18 - 18)  SpO2: 95% (19 Oct 2021 04:30) (95% - 96%)  CAPILLARY BLOOD GLUCOSE        I&O's Summary    18 Oct 2021 07:01  -  19 Oct 2021 07:00  --------------------------------------------------------  IN: 680 mL / OUT: 1800 mL / NET: -1120 mL        PHYSICAL EXAM:  HEAD:  Atraumatic, Normocephalic  NECK: Supple, No   JVD  CHEST/LUNG:   no     rales,     no,    rhonchi  HEART: Regular rate and rhythm;         murmur  ABDOMEN: Soft, Nontender, ;   EXTREMITIES:   no     edema  NEUROLOGY:  alert    LABS:                        12.1   9.08  )-----------( 437      ( 19 Oct 2021 06:23 )             37.0     10-19    133<L>  |  99  |  10  ----------------------------<  152<H>  3.5   |  22  |  0.63    Ca    9.2      19 Oct 2021 06:23    TPro  7.2  /  Alb  3.3  /  TBili  0.4  /  DBili  0.1  /  AST  31  /  ALT  50<H>  /  AlkPhos  210<H>  10-18                    Thyroid Stimulating Hormone, Serum: 1.81 uIU/mL (10-18 @ 13:03)          Consultant(s) Notes Reviewed:      Care Discussed with Consultants/Other Providers:

## 2021-10-19 NOTE — CONSULT NOTE ADULT - ASSESSMENT
cholelithiasis  Ct a/p showing gallbladder, liver and ducts wnl  MRCP demonstrating cholelithiasis and stone in gallbladder neck  pt asymptomatic  bili wnl  LFTs trending down  likely passed stone  diet as tolerated   no objection to DC  dw patient and daughter over phone     Advanced care planning was discussed with patient and family.  Advanced care planning forms were reviewed and discussed.  Risks, benefits and alternatives of gastroenterologic procedures were discussed in detail and all questions were answered.    30 minutes spent. 
76M with recent admission for E. Coli bacteremia and nephrolithiasis requiring right urethral stent presenting with one day history of right testicular pain and swelling. Patient was recently on urology service for which he was treated for E. Coli bacteremia with Zosyn with likely urinary source necessitating a right urethral stent after imaging revealed hydronephrosis and nephrolithiasis. Patient eventually cleared cultures and discharged on 10 days of Bactrim of which he completed. In addition, he has been on Augmentin for 5 days. He was planning to have a cystoscopy, right ureteroscopy, laser lithotripsy and stent placed on 10/20, but unfortunately his right testicle swelled up. He has also been experiencing fever, nausea, vomiting and decrease PO intake. He denies penile discharge. No prior history of gonorrhea or chlamydia. Patient had CT A&P showing mild right hydronephrosis with stent in place and 4 mm distal ureteral calculus. Patient had testicular US to rule out torsion and it showed right epididymoorchitis and bilateral hydroceles. In the ED patient given Zosyn, 2 L LR, Tylenol and Ibuprofen. Patient feeling better after interventions. There is no testicular pain when he doesn't move.   pt was supposed to see dr Friedman for cardiology clearance with  abnormal ecg.  pt denies of any cardiac symptoms but not sig activity  check echo for wall motion  DVT prophylaxis  will repeat ECG  lipid panel  abx
A/P: 77yo M BPH, Nephrolithiasis who presents with R. testicular pain, fevers.  Pt recently admitted in September and underwent Rt. JJ stent placement.  Scheduled for URS and definitive stone/stent mgmt on 10/20 with Dr. Alvarado.     - No urgent urologic indication, f/u ER imaging to ensure proper stent placement  - Pt with likely epididymoorchitis and fevers, f/u UCx/BCx  - Empiric Abx per prior sensitivities  - Serial scrotal examinations  - Likely will need to reschedule pt's surgery given fevers/infection    d/w Dr. Alvarado
76M with recent admission for E. Coli bacteremia and nephrolithiasis requiring right urethral stent presenting with one day history of right testicular pain and swelling with epididymoorchitis, fever, leukocytosis, sepsis    Nic Dominguez  Attending Physician   Division of Infectious Disease  Pager #[phone]  Available on Microsoft Teams also  After 5pm/weekend or no response, call #[phone]

## 2021-10-19 NOTE — DISCHARGE NOTE NURSING/CASE MANAGEMENT/SOCIAL WORK - NSDCFUADDAPPT_GEN_ALL_CORE_FT
Please follow up with cardiology outpatient in 1-2 weeks   Please follow up with PMD in  1-2 weeks   Please follow up with gastroenterology in 1-2 weeks    please call and make appointment for outpatient Urology in 1-2 weeks

## 2021-10-19 NOTE — DISCHARGE NOTE PROVIDER - NSDCFUSCHEDAPPT_GEN_ALL_CORE_FT
ZACH WHITE ; 11/10/2021 ; [facility] [address] [facility] ; 11/10/2021 ; MARK Wylie ZACH WHITE ; 10/26/2021 ; [facility]  ZACH WHITE ; 11/10/2021 ; [facility] [address] [facility] [address] ; 11/10/2021 ; NSESTEVAN Wylie

## 2021-10-19 NOTE — PROGRESS NOTE ADULT - PROVIDER SPECIALTY LIST ADULT
Cardiology
Cardiology
Internal Medicine
Internal Medicine
Urology
Internal Medicine
Urology
Infectious Disease

## 2021-10-19 NOTE — PROGRESS NOTE ADULT - SUBJECTIVE AND OBJECTIVE BOX
CARDIOLOGY     PROGRESS  NOTE   ________________________________________________    CHIEF COMPLAINT:Patient is a 76y old  Male who presents with a chief complaint of Right Testicle Swelling (19 Oct 2021 08:00)  no complain.  	  REVIEW OF SYSTEMS:  CONSTITUTIONAL: No fever, weight loss, or fatigue  EYES: No eye pain, visual disturbances, or discharge  ENT:  No difficulty hearing, tinnitus, vertigo; No sinus or throat pain  NECK: No pain or stiffness  RESPIRATORY: No cough, wheezing, chills or hemoptysis; No Shortness of Breath  CARDIOVASCULAR: No chest pain, palpitations, passing out, dizziness, or leg swelling  GASTROINTESTINAL: No abdominal or epigastric pain. No nausea, vomiting, or hematemesis; No diarrhea or constipation. No melena or hematochezia.  GENITOURINARY: No dysuria, frequency, hematuria, or incontinence  NEUROLOGICAL: No headaches, memory loss, loss of strength, numbness, or tremors  SKIN: No itching, burning, rashes, or lesions   LYMPH Nodes: No enlarged glands  ENDOCRINE: No heat or cold intolerance; No hair loss  MUSCULOSKELETAL: No joint pain or swelling; No muscle, back, or extremity pain  PSYCHIATRIC: No depression, anxiety, mood swings, or difficulty sleeping  HEME/LYMPH: No easy bruising, or bleeding gums  ALLERGY AND IMMUNOLOGIC: No hives or eczema	    [ ] All others negative	  [ ] Unable to obtain    PHYSICAL EXAM:  T(C): 37.2 (10-19-21 @ 08:42), Max: 37.4 (10-18-21 @ 18:50)  HR: 85 (10-19-21 @ 08:42) (79 - 87)  BP: 133/83 (10-19-21 @ 08:42) (128/75 - 133/83)  RR: 18 (10-19-21 @ 08:42) (18 - 18)  SpO2: 96% (10-19-21 @ 08:42) (95% - 96%)  Wt(kg): --  I&O's Summary    18 Oct 2021 07:01  -  19 Oct 2021 07:00  --------------------------------------------------------  IN: 680 mL / OUT: 1800 mL / NET: -1120 mL        Appearance: Normal	  HEENT:   Normal oral mucosa, PERRL, EOMI	  Lymphatic: No lymphadenopathy  Cardiovascular: Normal S1 S2, No JVD, N+ murmurs, No edema  Respiratory: Lungs clear to auscultation	  Psychiatry: A & O x 3, Mood & affect appropriate  Gastrointestinal:  Soft, Non-tender, + BS	  Skin: No rashes, No ecchymoses, No cyanosis	  Neurologic: Non-focal  Extremities: Normal range of motion, No clubbing, cyanosis or edema  Vascular: Peripheral pulses palpable 2+ bilaterally    MEDICATIONS  (STANDING):  ciprofloxacin     Tablet 500 milliGRAM(s) Oral every 12 hours  enoxaparin Injectable 40 milliGRAM(s) SubCutaneous daily  finasteride 5 milliGRAM(s) Oral daily  influenza   Vaccine 0.5 milliLiter(s) IntraMuscular once  tamsulosin 0.4 milliGRAM(s) Oral at bedtime      TELEMETRY: 	    ECG:  	  RADIOLOGY:  OTHER: 	  	  LABS:	 	    CARDIAC MARKERS:                                12.1   9.08  )-----------( 437      ( 19 Oct 2021 06:23 )             37.0     10-19    133<L>  |  99  |  10  ----------------------------<  152<H>  3.5   |  22  |  0.63    Ca    9.2      19 Oct 2021 06:23    TPro  7.2  /  Alb  3.3  /  TBili  0.4  /  DBili  0.1  /  AST  31  /  ALT  50<H>  /  AlkPhos  210<H>  10-18    proBNP:   Lipid Profile: Cholesterol 193  LDL --  HDL 31      HgA1c:   TSH: Thyroid Stimulating Hormone, Serum: 1.81 uIU/mL (10-18 @ 13:03)    < from: Transthoracic Echocardiogram (10.18.21 @ 11:41) >  1. Normal mitral valve. Minimal mitral regurgitation.  2. Calcified trileaflet aortic valve with normal opening.  No aortic valve regurgitation seen.  3. Normal left ventricular systolic function. No segmental  wall motion abnormalities.  4. Normal right ventricular size and function.        Assessment and plan  ---------------------------  76M with recent admission for E. Coli bacteremia and nephrolithiasis requiring right urethral stent presenting with one day history of right testicular pain and swelling. Patient was recently on urology service for which he was treated for E. Coli bacteremia with Zosyn with likely urinary source necessitating a right urethral stent after imaging revealed hydronephrosis and nephrolithiasis. Patient eventually cleared cultures and discharged on 10 days of Bactrim of which he completed. In addition, he has been on Augmentin for 5 days. He was planning to have a cystoscopy, right ureteroscopy, laser lithotripsy and stent placed on 10/20, but unfortunately his right testicle swelled up. He has also been experiencing fever, nausea, vomiting and decrease PO intake. He denies penile discharge. No prior history of gonorrhea or chlamydia. Patient had CT A&P showing mild right hydronephrosis with stent in place and 4 mm distal ureteral calculus. Patient had testicular US to rule out torsion and it showed right epididymoorchitis and bilateral hydroceles. In the ED patient given Zosyn, 2 L LR, Tylenol and Ibuprofen. Patient feeling better after interventions. There is no testicular pain when he doesn't move.   pt was supposed to see dr Friedman for cardiology clearance with  abnormal ecg.  pt denies of any cardiac symptoms but not sig activity  check echo for wall motion  DVT prophylaxis  will repeat ECG, awaiting old ecg noted with inferior wall mi  lipid panel  abx started on po Cipro  echo noted no WMA, fu as out pt

## 2021-10-19 NOTE — PROGRESS NOTE ADULT - ASSESSMENT
75yo M     h/o  BPH, Nephrolithiasis/   s/p Right  JJ  ureteral  stent 9/21  by urology      * who presents with R. testicular pain, fevers.   pt with   epididymoorchitis  on imaging   also with wbc of  16,000 on arrival   on iv ertapenem  seen by urology,  no intervention    *  elevated lfts   ct a/p, normal liver/ normal ducts  await MRCP  *abnormal  ekg/ inf  infarct/   pt  was  scgeduled  to see card this week for card  clearance  for  upcoming surgery/  card covering for  dr randhawa/   on dvt ppx  blood,  urine   c/s, negative  on po  cipro  for  10 days, per  ID  *  echo, normal  ef   stress  test per  card  for clearance /  [facility]  [facility]  * OR on 10/20, per  urology      pmd  d r spindller     rad< from: CT Abdomen and Pelvis No Cont (10.16.21 @ 17:15) >  IMPRESSION: Mild right hydronephrosis with stent in place. A 4 mm distal right ureteral calculus.  A 2 cm denzel stone calculus in the bladder.  --- End of Report ---  < end of copied text >        75yo M     h/o  BPH, Nephrolithiasis/   s/p Right  JJ  ureteral  stent 9/21  by urology      * who presents with R. testicular pain, fevers.   pt with   epididymoorchitis  on imaging   also with wbc of  16,000 on arrival   on iv ertapenem  seen by urology,  no intervention    *  elevated lfts   ct a/p, normal liver/ normal ducts  await MRCP  *abnormal  ekg/ inf  infarct/   pt  was  scgeduled  to see card this week for card  clearance  for  upcoming surgery/  card covering for  dr randhawa/   on dvt ppx  blood,  urine   c/s, negative  on po  cipro  for  10 days, per  ID  *  echo, normal  ef   stress  test per  card  for clearance , can be  done  as  an out pt   [facility]  [facility]  * OR   date  to be  re  scheduled, , per  urology   d/c   planning, pending   [facility] report    pmd  hussain ndiaye< from: CT Abdomen and Pelvis No Cont (10.16.21 @ 17:15) >  IMPRESSION: Mild right hydronephrosis with stent in place. A 4 mm distal right ureteral calculus.  A 2 cm denzel stone calculus in the bladder.  --- End of Report ---  < end of copied text >        77yo M     h/o  BPH, Nephrolithiasis/   s/p Right  JJ  ureteral  stent 9/21  by urology      * who presents with R. testicular pain, fevers.   pt with   epididymoorchitis  on imaging   also with wbc of  16,000 on arrival   on iv ertapenem  seen by urology,  no intervention    *  elevated lfts   ct a/p, normal liver/ normal ducts  MRCP  *abnormal  ekg/ inf  infarct/   pt  was  scgeduled  to see card this week for card  clearance  for  upcoming surgery/  card covering for  dr randhawa/   on dvt ppx  blood,  urine   c/s, negative  on po  cipro  for  10 days, per  ID  *  echo, normal  ef   stress  test per  card  for clearance , can be  done  as  an out pt   [facility]  [facility]  * OR   date  to be  re  scheduled, , per  urology  m[facility]p, stone neck, gb/no  cholecystitis/  and  ast/alt decreasing/ ?  stone passed   d/c     after seen by gi dr wall  pmd  d r kalyn     ad< from: MR MRCP w/wo IV Cont (10.18.21 @ 20:57) >  IMPRESSION:  Cholelithiasis without evidence of cholecystitis or biliary obstruction.  < end of copied text >     rad< from: CT Abdomen and Pelvis No Cont (10.16.21 @ 17:15) >  IMPRESSION: Mild right hydronephrosis with stent in place. A 4 mm distal right ureteral calculus.  A 2 cm denzel stone calculus in the bladder.  --- End of Report ---  < end of copied text >        77yo M     h/o  BPH, Nephrolithiasis/   s/p Right  JJ  ureteral  stent 9/21  by urology      * who presents with R. testicular pain, fevers.   pt with   epididymoorchitis  on imaging   also with wbc of  16,000 on arrival   on iv ertapenem  seen by urology,  no intervention  *  c/c hyponatremia. f/p as an out pt  *  elevated lfts   ct a/p, normal liver/ normal ducts  MRCP  *abnormal  ekg/ inf  infarct/   pt  was  scgeduled  to see card this week for card  clearance  for  upcoming surgery/  card covering for  dr randhawa/   on dvt ppx  blood,  urine   c/s, negative  on po  cipro  for  10 days, per  ID  *  echo, normal  ef   stress  test per  card  for clearance , can be  done  as  an out pt   [facility]  [facility]  * OR   date  to be  re  scheduled, , per  urology  m[facility]p, stone neck, gb/no  cholecystitis/  and  ast/alt decreasing/ ?  stone passed   d/c     after seen by gi dr wall  pmd  hussain mccain     ad< from: MR MRCP w/wo IV Cont (10.18.21 @ 20:57) >  IMPRESSION:  Cholelithiasis without evidence of cholecystitis or biliary obstruction.  < end of copied text >     rad< from: CT Abdomen and Pelvis No Cont (10.16.21 @ 17:15) >  IMPRESSION: Mild right hydronephrosis with stent in place. A 4 mm distal right ureteral calculus.  A 2 cm denzel stone calculus in the bladder.  --- End of Report ---  < end of copied text >

## 2021-10-19 NOTE — PROGRESS NOTE ADULT - PROBLEM SELECTOR PLAN 1
-better  -cont cipro  -ciprofloxacin 500 mg po bid x 10 days on DC  -potential side effects of FQs explained including GI, Cdiff, tendinitis, resistance issues, development of allergies, etc.

## 2021-10-19 NOTE — PROGRESS NOTE ADULT - REASON FOR ADMISSION
Right Testicle Swelling

## 2021-10-19 NOTE — DISCHARGE NOTE PROVIDER - NSDCMRMEDTOKEN_GEN_ALL_CORE_FT
finasteride 5 mg oral tablet: 1 tab(s) orally once a day (at bedtime)  tamsulosin 0.4 mg oral capsule: 1 cap(s) orally once a day (at bedtime)  Vitamin C 500 mg oral tablet: 2 tab(s) orally once a day   ciprofloxacin 500 mg oral tablet: 1 tab(s) orally every 12 hours  finasteride 5 mg oral tablet: 1 tab(s) orally once a day (at bedtime)  tamsulosin 0.4 mg oral capsule: 1 cap(s) orally once a day (at bedtime)  Vitamin C 500 mg oral tablet: 2 tab(s) orally once a day

## 2021-10-19 NOTE — PROGRESS NOTE ADULT - SUBJECTIVE AND OBJECTIVE BOX
ZACH WHITE 76y [id number]    Patient is a 76y old  Male who presents with a chief complaint of Right Testicle Swelling (19 Oct 2021 10:22)      Follow Up/CC:  ID following for epididymoorchitis     Interval History/ROS: no fever, less testicular pain     Allergies    No Known Allergies    Intolerances        ANTIMICROBIALS:  ciprofloxacin     Tablet 500 every 12 hours      MEDICATIONS  (STANDING):  ciprofloxacin     Tablet 500 milliGRAM(s) Oral every 12 hours  enoxaparin Injectable 40 milliGRAM(s) SubCutaneous daily  finasteride 5 milliGRAM(s) Oral daily  influenza   Vaccine 0.5 milliLiter(s) IntraMuscular once  tamsulosin 0.4 milliGRAM(s) Oral at bedtime    MEDICATIONS  (PRN):  acetaminophen   Tablet .. 650 milliGRAM(s) Oral every 6 hours PRN Temp greater or equal to 38C (100.4F), Mild Pain (1 - 3)        Vital Signs Last 24 Hrs  T(C): 37.2 (19 Oct 2021 08:42), Max: 37.4 (18 Oct 2021 18:50)  T(F): 99 (19 Oct 2021 08:42), Max: 99.3 (18 Oct 2021 18:50)  HR: 85 (19 Oct 2021 08:42) (79 - 87)  BP: 133/83 (19 Oct 2021 08:42) (128/75 - 133/83)  BP(mean): --  RR: 18 (19 Oct 2021 08:42) (18 - 18)  SpO2: 96% (19 Oct 2021 08:42) (95% - 96%)    CBC Full  -  ( 19 Oct 2021 06:23 )  WBC Count : 9.08 K/uL  RBC Count : 4.40 M/uL  Hemoglobin : 12.1 g/dL  Hematocrit : 37.0 %  Platelet Count - Automated : 437 K/uL  Mean Cell Volume : 84.1 fl  Mean Cell Hemoglobin : 27.5 pg  Mean Cell Hemoglobin Concentration : 32.7 gm/dL  Auto Neutrophil # : x  Auto Lymphocyte # : x  Auto Monocyte # : x  Auto Eosinophil # : x  Auto Basophil # : x  Auto Neutrophil % : x  Auto Lymphocyte % : x  Auto Monocyte % : x  Auto Eosinophil % : x  Auto Basophil % : x    10-19    133<L>  |  99  |  10  ----------------------------<  152<H>  3.5   |  22  |  0.63    Ca    9.2      19 Oct 2021 06:23    TPro  7.2  /  Alb  3.3  /  TBili  0.4  /  DBili  0.1  /  AST  31  /  ALT  50<H>  /  AlkPhos  210<H>  10-18    LIVER FUNCTIONS - ( 18 Oct 2021 07:42 )  Alb: 3.3 g/dL / Pro: 7.2 g/dL / ALK PHOS: 210 U/L / ALT: 50 U/L / AST: 31 U/L / GGT: x               MICROBIOLOGY:  .Blood Blood-Peripheral  10-16-21   No growth to date.  --  --      Clean Catch Clean Catch (Midstream)  10-16-21   <10,000 CFU/mL Normal Urogenital Ptaria  --  --      Clean Catch Clean Catch (Midstream)  10-08-21   No growth  --  --              v    Rapid RVP Result: Kenneth (10-16 @ 15:11)          RADIOLOGY

## 2021-10-19 NOTE — DISCHARGE NOTE PROVIDER - PROVIDER TOKENS
PROVIDER:[TOKEN:[[id number]],FOLLOWUP:[1 week]],PROVIDER:[TOKEN:[[id number]],FOLLOWUP:[1 week]]

## 2021-10-19 NOTE — DISCHARGE NOTE PROVIDER - NSDCCPCAREPLAN_GEN_ALL_CORE_FT
PRINCIPAL DISCHARGE DIAGNOSIS  Diagnosis: Epididymitis  Assessment and Plan of Treatment: discharge on ciprofloxcin 500mg oral q12 hours      SECONDARY DISCHARGE DIAGNOSES  Diagnosis: BPH (benign prostatic hyperplasia)  Assessment and Plan of Treatment: continue with flomax    Diagnosis: Epididymo-orchitis  Assessment and Plan of Treatment: Please follow up with urology outpatient

## 2021-10-19 NOTE — DISCHARGE NOTE PROVIDER - NSDCFUADDAPPT_GEN_ALL_CORE_FT
Please follow up with cardiology outpatient in 1-2 weeks   Please follow up with PMD in  1-2 weeks   Please follow up with gastroenterology in 1-2 weeks   Please follow up with cardiology outpatient in 1-2 weeks   Please follow up with PMD in  1-2 weeks   Please follow up with gastroenterology in 1-2 weeks    please call and make appointment for outpatient Urology in 1-2 weeks

## 2021-10-19 NOTE — DISCHARGE NOTE PROVIDER - CARE PROVIDER_API CALL
Obi Alvarado)  Urology  [facility] - Dept of Urology, [address] [place] [address]  Phone: [phone]  Fax: [phone]  Follow Up Time: 1 week    Juan Francisco Rahman  CARDIOVASCULAR DISEASE  [address] [place] [address], [facility] [address]  [place] [address]  Phone: [phone]  Fax: [phone]  Follow Up Time: 1 week

## 2021-10-19 NOTE — DISCHARGE NOTE NURSING/CASE MANAGEMENT/SOCIAL WORK - PATIENT PORTAL LINK FT
You can access the FollowMyHealth Patient Portal offered by [facility] by registering at the following website: [email or web address]. By joining [facility]’s FollowMyHealth portal, you will also be able to view your health information using other applications (apps) compatible with our system.

## 2021-10-19 NOTE — CONSULT NOTE ADULT - SUBJECTIVE AND OBJECTIVE BOX
[facility]  Percy Ledezma PA-C  Counts include 234 beds at the [facility] [place] [address]  [phone]      Chief Complaint:  Patient is a 76y old  Male who presents with a chief complaint of Right Testicle Swelling (19 Oct 2021 09:41)      HPI: 76M with recent admission for E. Coli bacteremia and nephrolithiasis requiring right urethral stent presenting with one day history of right testicular pain and swelling. Patient was recently on urology service for which he was treated for E. Coli bacteremia with Zosyn with likely urinary source necessitating a right urethral stent after imaging revealed hydronephrosis and nephrolithiasis. Patient eventually cleared cultures and discharged on 10 days of Bactrim of which he completed. In addition, he has been on Augmentin for 5 days. He was planning to have a cystoscopy, right ureteroscopy, laser lithotripsy and stent placed on 10/20, but unfortunately his right testicle swelled up. He has also been experiencing fever, nausea, vomiting and decrease PO intake. He denies penile discharge. No prior history of gonorrhea or chlamydia. Patient had CT A&P showing mild right hydronephrosis with stent in place and 4 mm distal ureteral calculus. Patient had testicular US to rule out torsion and it showed right epididymoorchitis and bilateral hydroceles. In the ED patient given Zosyn, 2 L LR, Tylenol and Ibuprofen. Patient feeling better after interventions. There is no testicular pain when he doesn't move. As per daughter patient was also taking lots of Tylenol at home for fevers.    Allergies:  No Known Allergies      Medications:  acetaminophen   Tablet .. 650 milliGRAM(s) Oral every 6 hours PRN  ciprofloxacin     Tablet 500 milliGRAM(s) Oral every 12 hours  enoxaparin Injectable 40 milliGRAM(s) SubCutaneous daily  finasteride 5 milliGRAM(s) Oral daily  influenza   Vaccine 0.5 milliLiter(s) IntraMuscular once  tamsulosin 0.4 milliGRAM(s) Oral at bedtime      PMHX/PSHX:  No Past Medical History    Prostate hypertrophy    BPH (benign prostatic hyperplasia)    Calculus of ureter    Prostate Cancer Screening    Cataract    S/P ureteral stent placement    S/P cataract surgery        Family history:  No pertinent family history in first degree relatives    FH: stomach cancer (Father)    FH: stomach cancer (Sibling)        Social History:     ROS:     General:  No wt loss, fevers, chills, night sweats, fatigue,   Eyes:  Good vision, no reported pain  ENT:  No sore throat, pain, runny nose, dysphagia  CV:  No pain, palpitations, hypo/hypertension  Resp:  No dyspnea, cough, tachypnea, wheezing  GI:  No pain, No nausea, No vomiting, No diarrhea, No constipation, No weight loss, No fever, No pruritis, No rectal bleeding, No tarry stools, No dysphagia,  :  No pain, bleeding, incontinence, nocturia +testicular swelling   Muscle:  No pain, weakness  Neuro:  No weakness, tingling, memory problems  Psych:  No fatigue, insomnia, mood problems, depression  Endocrine:  No polyuria, polydipsia, cold/heat intolerance  Heme:  No petechiae, ecchymosis, easy bruisability  Skin:  No rash, tattoos, scars, edema      PHYSICAL EXAM:   Vital Signs:  Vital Signs Last 24 Hrs  T(C): 37.2 (19 Oct 2021 08:42), Max: 37.4 (18 Oct 2021 18:50)  T(F): 99 (19 Oct 2021 08:42), Max: 99.3 (18 Oct 2021 18:50)  HR: 85 (19 Oct 2021 08:42) (79 - 87)  BP: 133/83 (19 Oct 2021 08:42) (128/75 - 133/83)  BP(mean): --  RR: 18 (19 Oct 2021 08:42) (18 - 18)  SpO2: 96% (19 Oct 2021 08:42) (95% - 96%)  Daily     Daily     GENERAL:  Appears stated age,   HEENT:  NC/AT,    CHEST:  Full & symmetric excursion,   HEART:  Regular rhythm  ABDOMEN:  Soft, non-tender, non-distended,   EXTEREMITIES:  no cyanosis,clubbing or edema  SKIN:  No rash  NEURO:  Alert,    LABS:                        12.1   9.08  )-----------( 437      ( 19 Oct 2021 06:23 )             37.0     10-19    133<L>  |  99  |  10  ----------------------------<  152<H>  3.5   |  22  |  0.63    Ca    9.2      19 Oct 2021 06:23    TPro  7.2  /  Alb  3.3  /  TBili  0.4  /  DBili  0.1  /  AST  31  /  ALT  50<H>  /  AlkPhos  210<H>  10-18    LIVER FUNCTIONS - ( 18 Oct 2021 07:42 )  Alb: 3.3 g/dL / Pro: 7.2 g/dL / ALK PHOS: 210 U/L / ALT: 50 U/L / AST: 31 U/L / GGT: x                   Imaging:  < from: MR MRCP w/wo IV Cont (10.18.21 @ 20:57) >    EXAM:  MR MRCP WAW IC                            PROCEDURE DATE:  10/18/2021            INTERPRETATION:  CLINICAL INFORMATION: Fever, transaminitis.    COMPARISON: CT abdomen pelvis dated 10/16/2021.    CONTRAST/COMPLICATIONS:  IV Contrast: Gadavist  8cc cc administered   2cc cc discarded  Oral Contrast: NONE  Complications: None reported at time of study completion    PROCEDURE:  MRI of the abdomen was performed.  MRCP was performed.    FINDINGS:  LOWER CHEST: Within normal limits.    LIVER: Normal size and contour. No liver mass.  BILE DUCTS: No intra or extrahepatic biliary ductal dilatation. Common bile duct measures 4 mm. No stone is seen within it.  GALLBLADDER: 2.3 cm stone in the gallbladder neck. No pericholecystic inflammation.  SPLEEN: Within normal limits.  PANCREAS: Within normal limits.  ADRENALS: Within normal limits.  KIDNEYS/URETERS: Bilateral simple renal cortical cysts. No hydronephrosis. Right ureteral stent.    VISUALIZED PORTIONS:  BOWEL: Sigmoid diverticulosis.  PERITONEUM: No ascites.  VESSELS: Within normal limits.  RETROPERITONEUM/LYMPH NODES: No lymphadenopathy.  ABDOMINAL WALL: Within normal limits.  BONES: Within normal limits.    IMPRESSION:  Cholelithiasis without evidence of cholecystitis or biliary obstruction.

## 2021-10-19 NOTE — DISCHARGE NOTE PROVIDER - HOSPITAL COURSE
77yo M     h/o  BPH, Nephrolithiasis/   s/p Right  JJ  ureteral  stent 9/21  by urology      * who presents with R. testicular pain, fevers.   pt with   epididymoorchitis  on imaging   also with wbc of  16,000 on arrival  seen by urology,  no intervention  *  c/c hyponatremia. f/p as an out pt  *  elevated lfts   ct a/p, normal liver/ normal ducts  MRCP  *abnormal  ekg/ inf  infarct/   pt  was  scgeduled  to see card this week for card  clearance  for  upcoming surgery/  card covering for  dr randhawa/   on dvt ppx  blood,  urine   c/s, negative  on po  cipro  for  10 days, per  ID  dr grubbs  *  echo, normal  ef   stress  test per  card  for clearance , can be  done  as  an out pt  with  hussain dorsey     * OR   date  to be  re  scheduled, , per  urology  mrcp, stone neck, gb/no  cholecystitis/  and  ast/alt decreasing/ ?  stone passed   d/c     after seen by gi dr wall  pmd  hussain mccain     ad< from: MR MRCP w/wo IV Cont (10.18.21 @ 20:57) >  IMPRESSION:  Cholelithiasis without evidence of cholecystitis or biliary obstruction.  < end of copied text >     rad< from: CT Abdomen and Pelvis No Cont (10.16.21 @ 17:15) >  IMPRESSION: Mild right hydronephrosis with stent in place. A 4 mm distal right ureteral calculus.  A 2 cm denzel stone calculus in the bladder.  --- End of Report ---  < end of copied text >   75yo M     h/o  BPH, Nephrolithiasis/   s/p Right  JJ  ureteral  stent 9/21  by urology      * who presents with R. testicular pain, fevers.   pt with   epididymoorchitis  on imaging   also with wbc of  16,000 on arrival  seen by urology,  no intervention  *  c/c hyponatremia. f/p as an out pt  *  elevated lfts   ct a/p, normal liver/ normal ducts  MRCP  *abnormal  ekg/ inf  infarct/   pt  was  scgeduled  to see card this week for card  clearance  for  upcoming surgery/  card covering for  dr randhawa/   on dvt ppx  blood,  urine   c/s, negative  on po  cipro  for  10 days, per  ID  dr grubbs  *  echo, normal  ef   stress  test per  card  for clearance , can be  done  as  an out pt  with  hussain dorsey     * OR   date  to be  re  scheduled, , per  urology  mrcp, stone neck, gb/no  cholecystitis/  and  ast/alt decreasing/ ?  stone passed   d/c     after seen by gi dr wall  pmd  hussain mccain     ad< from: MR MRCP w/wo IV Cont (10.18.21 @ 20:57) >  IMPRESSION:  Cholelithiasis without evidence of cholecystitis or biliary obstruction.  < end of copied text >     rad< from: CT Abdomen and Pelvis No Cont (10.16.21 @ 17:15) >  IMPRESSION: Mild right hydronephrosis with stent in place. A 4 mm distal right ureteral calculus.  A 2 cm denzel stone calculus in the bladder.  --- End of Report ---  < end of copied text >    Patient seen by Dr. Gomez no objective for discharge 77yo M     h/o  BPH, Nephrolithiasis/   s/p Right  JJ  ureteral  stent 9/21  by urology      * who presents with R. testicular pain, fevers.   pt with   epididymoorchitis  on imaging   also with wbc of  16,000 on arrival  seen by urology,  no intervention  *  c/c hyponatremia. f/p as an out pt  *  elevated lfts   ct a/p, normal liver/ normal ducts  MRCP  *abnormal  ekg/ inf  infarct/   pt  was  scgeduled  to see card this week for card  clearance  for  upcoming surgery/  card covering for  dr randhawa/   on dvt ppx  blood,  urine   c/s, negative  on po  cipro  for  10 days, per  ID  dr grubbs  *  echo, normal  ef   stress  test per  card  for clearance , can be  done  as  an out pt  with  hussain dorsey     * OR   date  to be  re  scheduled, , per  urology  mrcp, stone neck, gb/no  cholecystitis/  and  ast/alt decreasing/ ?  stone passed   d/c     after seen by gi dr wall  pmd  hussain mccain     ad< from: MR MRCP w/wo IV Cont (10.18.21 @ 20:57) >  IMPRESSION:  Cholelithiasis without evidence of cholecystitis or biliary obstruction.  < end of copied text >     rad< from: CT Abdomen and Pelvis No Cont (10.16.21 @ 17:15) >  IMPRESSION: Mild right hydronephrosis with stent in place. A 4 mm distal right ureteral calculus.  A 2 cm denzel stone calculus in the bladder.  --- End of Report ---  < end of copied text >    Patient seen by Dr. Gomez no objective for discharge     patient cleared for discharge 75 yo male PMH BPH, Nephrolithiasis, s/p Right  JJ  ureteral  stent 9/21  by urology who presents with R. testicular pain, fevers.  Patient found to have epididymoorchitis  on imaging with wbc of  16,000 on arrival. Was seen by urology,  no intervention  Mild hyponatremia. f/p as an out pt. Elevated LFT. CT  a/p, normal liver/ normal CBD ducts. MRCP noted gallstones, no CBD dilation,   no acute cholycystitis.       Had abnormal  ekg, inferior infarct, was  scheduled  to see card this week for card  clearance  for  upcoming surgery/  card covering for  dr randhawa/  Blood and urine   c/s negative. On po  cipro  for  10 days, per  ID  dr Dominguez. TTE noted normal  EF, normal LV, RV function. Stress  test per  card  for clearance , can be  done  as  an out pt  with  Dr. Rahman.     OR   date  to be  re  scheduled per  urology. GI felt increased LFT were from recently passed gallstone. See attached med list. 75 yo male PMH BPH, Nephrolithiasis, s/p Right  JJ  ureteral  stent 9/21  by urology who presents with R. testicular pain, fevers.  Patient found to have epididymoorchitis  on imaging with wbc of  16,000 on arrival. Was seen by urology,  no intervention  Mild hyponatremia. f/p as an out pt. Elevated LFT. CT  a/p, normal liver/ normal CBD ducts. MRCP noted gallstones, no CBD dilation,   no acute cholycystitis.       Had abnormal  ekg, inferior infarct, was  scheduled  to see card this week for card  clearance  for  upcoming surgery/  card covering for  dr randhawa/  Blood and urine   c/s negative. On po  cipro  for  10 days, per  ID  dr Dominguez. TTE noted normal  EF, normal LV, RV function. Stress  test per  card  for clearance , can be  done  as  an out pt  with  Dr. Rahman.     OR   date  to be  re  scheduled per  urology. GI felt increased LFT were from recently passed gallstone. See attached med list.     Sepsis was present on arrival from epididimytitis.

## 2021-10-19 NOTE — CHART NOTE - NSCHARTNOTEFT_GEN_A_CORE
Patient is a 76y old  Male who presents with a chief complaint of Right Testicle Swelling (19 Oct 2021 13:34)      Vital Signs Last 24 Hrs  T(C): 36.8 (19 Oct 2021 16:22), Max: 37.4 (18 Oct 2021 18:50)  T(F): 98.2 (19 Oct 2021 16:22), Max: 99.3 (18 Oct 2021 18:50)  HR: 97 (19 Oct 2021 16:22) (79 - 97)  BP: 148/86 (19 Oct 2021 16:22) (124/70 - 148/86)  BP(mean): --  RR: 18 (19 Oct 2021 16:22) (18 - 18)  SpO2: 97% (19 Oct 2021 16:22) (94% - 97%)      Labs:                          12.1   9.08  )-----------( 437      ( 19 Oct 2021 06:23 )             37.0     10-19    133<L>  |  99  |  10  ----------------------------<  152<H>  3.5   |  22  |  0.63    Ca    9.2      19 Oct 2021 06:23    TPro  7.2  /  Alb  3.3  /  TBili  0.4  /  DBili  0.1  /  AST  31  /  ALT  50<H>  /  AlkPhos  210<H>  10-18            Radiology:    Physical Exam:  General: WN/WD NAD  Neurology: A&Ox3, nonfocal, GRAY x 4  Head:  Normocephalic, atraumatic  Respiratory: CTA B/L  CV: RRR, S1S2, no murmur  Abdominal: Soft, NT, ND no palpable mass  MSK: No edema, + peripheral pulses, FROM all 4 extremity    Discharge Note and Plan: Patient stable for discharge   Discussed with Dr. Vick reviewed medications    >Follow up with   >  >  >

## 2021-10-19 NOTE — PROGRESS NOTE ADULT - ASSESSMENT
76M with recent admission for E. Coli bacteremia and nephrolithiasis requiring right urethral stent presenting with one day history of right testicular pain and swelling with epididymoorchitis, fever, leukocytosis, sepsis    Nic Dominguez  Attending Physician   Division of Infectious Disease  Pager #[phone]  Available on Microsoft Teams also  After 5pm/weekend or no response, call #[phone]

## 2021-10-20 ENCOUNTER — APPOINTMENT (OUTPATIENT)
Dept: UROLOGY | Facility: HOSPITAL | Age: 76
End: 2021-10-20

## 2021-10-26 ENCOUNTER — APPOINTMENT (OUTPATIENT)
Dept: UROLOGY | Facility: CLINIC | Age: 76
End: 2021-10-26

## 2021-10-27 ENCOUNTER — OUTPATIENT (OUTPATIENT)
Dept: OUTPATIENT SERVICES | Facility: HOSPITAL | Age: 76
LOS: 1 days | End: 2021-10-27
Payer: COMMERCIAL

## 2021-10-27 VITALS
RESPIRATION RATE: 14 BRPM | HEIGHT: 66 IN | TEMPERATURE: 97 F | OXYGEN SATURATION: 97 % | WEIGHT: 177.03 LBS | SYSTOLIC BLOOD PRESSURE: 118 MMHG | DIASTOLIC BLOOD PRESSURE: 81 MMHG | HEART RATE: 97 BPM

## 2021-10-27 DIAGNOSIS — G47.33 OBSTRUCTIVE SLEEP APNEA (ADULT) (PEDIATRIC): ICD-10-CM

## 2021-10-27 DIAGNOSIS — N20.1 CALCULUS OF URETER: ICD-10-CM

## 2021-10-27 DIAGNOSIS — Z98.49 CATARACT EXTRACTION STATUS, UNSPECIFIED EYE: Chronic | ICD-10-CM

## 2021-10-27 DIAGNOSIS — Z96.0 PRESENCE OF UROGENITAL IMPLANTS: Chronic | ICD-10-CM

## 2021-10-27 LAB
ANION GAP SERPL CALC-SCNC: 17 MMOL/L — SIGNIFICANT CHANGE UP (ref 5–17)
BUN SERPL-MCNC: 12 MG/DL — SIGNIFICANT CHANGE UP (ref 7–23)
CALCIUM SERPL-MCNC: 10.1 MG/DL — SIGNIFICANT CHANGE UP (ref 8.4–10.5)
CHLORIDE SERPL-SCNC: 100 MMOL/L — SIGNIFICANT CHANGE UP (ref 96–108)
CO2 SERPL-SCNC: 22 MMOL/L — SIGNIFICANT CHANGE UP (ref 22–31)
CREAT SERPL-MCNC: 0.83 MG/DL — SIGNIFICANT CHANGE UP (ref 0.5–1.3)
GLUCOSE SERPL-MCNC: 137 MG/DL — HIGH (ref 70–99)
HCT VFR BLD CALC: 43.2 % — SIGNIFICANT CHANGE UP (ref 39–50)
HGB BLD-MCNC: 13.3 G/DL — SIGNIFICANT CHANGE UP (ref 13–17)
MCHC RBC-ENTMCNC: 26.7 PG — LOW (ref 27–34)
MCHC RBC-ENTMCNC: 30.8 GM/DL — LOW (ref 32–36)
MCV RBC AUTO: 86.6 FL — SIGNIFICANT CHANGE UP (ref 80–100)
NRBC # BLD: 0 /100 WBCS — SIGNIFICANT CHANGE UP (ref 0–0)
PLATELET # BLD AUTO: 586 K/UL — HIGH (ref 150–400)
POTASSIUM SERPL-MCNC: 4.3 MMOL/L — SIGNIFICANT CHANGE UP (ref 3.5–5.3)
POTASSIUM SERPL-SCNC: 4.3 MMOL/L — SIGNIFICANT CHANGE UP (ref 3.5–5.3)
RBC # BLD: 4.99 M/UL — SIGNIFICANT CHANGE UP (ref 4.2–5.8)
RBC # FLD: 14.3 % — SIGNIFICANT CHANGE UP (ref 10.3–14.5)
SODIUM SERPL-SCNC: 139 MMOL/L — SIGNIFICANT CHANGE UP (ref 135–145)
WBC # BLD: 7.92 K/UL — SIGNIFICANT CHANGE UP (ref 3.8–10.5)
WBC # FLD AUTO: 7.92 K/UL — SIGNIFICANT CHANGE UP (ref 3.8–10.5)

## 2021-10-27 PROCEDURE — 80048 BASIC METABOLIC PNL TOTAL CA: CPT

## 2021-10-27 PROCEDURE — G0463: CPT

## 2021-10-27 PROCEDURE — 85027 COMPLETE CBC AUTOMATED: CPT

## 2021-10-27 PROCEDURE — 87086 URINE CULTURE/COLONY COUNT: CPT

## 2021-10-27 RX ORDER — CEFAZOLIN SODIUM 1 G
2000 VIAL (EA) INJECTION ONCE
Refills: 0 | Status: DISCONTINUED | OUTPATIENT
Start: 2021-11-10 | End: 2021-11-24

## 2021-10-27 NOTE — H&P PST ADULT - PROBLEM SELECTOR PLAN 1
Cystoscopy  Right Ureteroscopy  Laser Lithotripsy  Stent Placement  Repeat Urine culture sent from PST on 10/27/21  Pt will f/u with PCP and Cardiologist for preop evaluation

## 2021-10-27 NOTE — H&P PST ADULT - HISTORY OF PRESENT ILLNESS
77 y/o male with PMH of BPH here for PST. Pt admitted to [facility] on 9/5/21 and diagnosed with 7mm right distal ureteral stone on CT scan. Pt s/p emergent cystoscopy and right ureteral stent placement. Pt was discharged to follow up with surgeon. Pt was found to have E.Coli in UCx and finished Augmentin on 10/5/2021. Pt complaining of occasional dysuria but denies hematuria and other voiding difficulties. Pt reports to intermittent right flank pain at times. Pt electing for cystoscopy, right ureteroscopy, laser lithotripsy, stent placement on 10/20/21.  75 y/o male with PMH of QUINTEN, BPH and right ureteral stone s/p right ureteral stent placement on 9/5/21. Pt was scheduled for Right Ureteroscopy and Laser Lithotripsy on 10/20/21, however surgery was rescheduled due to hospital admission 10/16/21 for right epididymoorchitis. Pt was discharged on Ciprofloxacin 500 mg twice daily and denies fever/testicular swelling. He is now scheduled for Cystoscopy, Right Ureteroscopy, Laser Lithotripsy and Stent Placement on 11/10/21.    Pt is scheduled for Covid-19 PCR on 11/7/21 at [facility].

## 2021-10-27 NOTE — H&P PST ADULT - GENERAL GENITOURINARY SYMPTOMS
10/2021 urine culture >100,000 e Coli/flank pain R/bladder infections flank pain R/bladder infections

## 2021-10-27 NOTE — H&P PST ADULT - ATTENDING COMMENTS
Patient seen and examined, plan for cystoscopy, right ureteroscopy, cystolitholapaxy and all indicated procedures

## 2021-10-27 NOTE — H&P PST ADULT - NSICDXPASTMEDICALHX_GEN_ALL_CORE_FT
PAST MEDICAL HISTORY:  BPH (benign prostatic hyperplasia)     Calculus of ureter      PAST MEDICAL HISTORY:  BPH (benign prostatic hyperplasia)     Calculus of ureter right    E coli bacteremia 9/2021 - right nephrolithiasis with right ureteral stent placed, treated with Zosyn in hospital, discharged on Bactrim    Epididymoorchitis 10/2021 - on Cipro 500 mg twice daily

## 2021-10-27 NOTE — H&P PST ADULT - NSICDXPASTSURGICALHX_GEN_ALL_CORE_FT
PAST SURGICAL HISTORY:  Prostate Cancer Screening biopsy    S/P cataract surgery (Bilateral eyes, 2011)    S/P ureteral stent placement (9/4/2021)     PAST SURGICAL HISTORY:  Prostate Cancer Screening biopsy    S/P cataract surgery 2011 - bilateral eyes    S/P ureteral stent placement 9/5/2021

## 2021-10-27 NOTE — H&P PST ADULT - NSANTHOSAYNRD_GEN_A_CORE
s/p sleep study in 2011, diagnosed with QUINTEN but refusing to use CPAP/No. QUINTEN screening performed.  STOP BANG Legend: 0-2 = LOW Risk; 3-4 = INTERMEDIATE Risk; 5-8 = HIGH Risk

## 2021-10-28 LAB
CULTURE RESULTS: NO GROWTH — SIGNIFICANT CHANGE UP
SPECIMEN SOURCE: SIGNIFICANT CHANGE UP

## 2021-10-29 PROBLEM — R78.81 BACTEREMIA: Chronic | Status: ACTIVE | Noted: 2021-10-27

## 2021-10-29 PROBLEM — N45.3 EPIDIDYMO-ORCHITIS: Chronic | Status: ACTIVE | Noted: 2021-10-27

## 2021-10-29 PROBLEM — N20.1 CALCULUS OF URETER: Chronic | Status: ACTIVE | Noted: 2021-10-08

## 2021-11-04 ENCOUNTER — APPOINTMENT (OUTPATIENT)
Dept: UROLOGY | Facility: CLINIC | Age: 76
End: 2021-11-04
Payer: MEDICARE

## 2021-11-04 VITALS
SYSTOLIC BLOOD PRESSURE: 129 MMHG | WEIGHT: 177 LBS | RESPIRATION RATE: 17 BRPM | DIASTOLIC BLOOD PRESSURE: 82 MMHG | HEART RATE: 91 BPM | TEMPERATURE: 97.6 F | HEIGHT: 66 IN | BODY MASS INDEX: 28.45 KG/M2

## 2021-11-04 DIAGNOSIS — R97.20 ELEVATED PROSTATE, SPECIFIC ANTIGEN [PSA]: ICD-10-CM

## 2021-11-04 PROCEDURE — 99213 OFFICE O/P EST LOW 20 MIN: CPT

## 2021-11-04 NOTE — PHYSICAL EXAM
[General Appearance - Well Developed] : well developed [General Appearance - Well Nourished] : well nourished [Bowel Sounds] : normal bowel sounds [Abdomen Soft] : soft [FreeTextEntry1] : right hydrocele, inflammation resolved [Skin Color & Pigmentation] : normal skin color and pigmentation [Skin Turgor] : supple [Heart Rate And Rhythm] : Heart rate and rhythm were normal [] : no respiratory distress [Respiration, Rhythm And Depth] : normal respiratory rhythm and effort [Oriented To Time, Place, And Person] : oriented to person, place, and time [Not Anxious] : not anxious [Normal Station and Gait] : the gait and station were normal for the patient's age [No Focal Deficits] : no focal deficits

## 2021-11-04 NOTE — HISTORY OF PRESENT ILLNESS
[FreeTextEntry1] : HPI: Mr. Storey is a 76 year old male with hx of BPH, nephrolithiasis presented to the ED with AMS, \par fevers and right flank pain. Patient found to have 7mm right distal ureteral  stone on CT scan. Patient emergently taken to the OR 9/5 for cystoscopy, right ureteral stent placement. Urine and blood cultures grew E.Coli on Zosyn IV. Repeat blood cultures no growth to date. Also found to have a 2-3 cm bladder stone. No nausea/vomiting, but has had fever at home to 102 since Saturday but did not want to go to the emergency room. \par \par PVR today 35 cc. Nocturia previously 3-4x. Had prevoiusly seen Dr. Pringle with PSA to 10.8, mpMRI PIRADS1 lesion noted, 145g gland. \par \par Anticoagulation: Previously on ASA\par All: None\par Social: lives at home, never smoker, never ETOH\par PMHx: BPH, nephrolithiasis\par FHx: gastric cancer\par PSHx: No prior surgeries\par Labs: Cr 0.9 9/6, UCx with E. coli, PIRADS1 lesion \par Imaging: CT in PACS with 7 mm ureteral stone, 2 cm bladder stone\par \par 11/4:\par Here after admissoin to hospital. He was found to have R epididymoorchitis. WBC up to 16. He was found to have cardiology inferior infarct, seeing cardiology tomorrow. Finished cipro. Echo in hospital WNL, but needed stress test for clearance. \par  [Urinary Incontinence] : no urinary incontinence [Urinary Retention] : no urinary retention [Urinary Urgency] : no urinary urgency [Urinary Frequency] : no urinary frequency

## 2021-11-06 LAB — BACTERIA UR CULT: NORMAL

## 2021-11-07 ENCOUNTER — OUTPATIENT (OUTPATIENT)
Dept: OUTPATIENT SERVICES | Facility: HOSPITAL | Age: 76
LOS: 1 days | End: 2021-11-07
Payer: COMMERCIAL

## 2021-11-07 DIAGNOSIS — Z96.0 PRESENCE OF UROGENITAL IMPLANTS: Chronic | ICD-10-CM

## 2021-11-07 DIAGNOSIS — Z98.49 CATARACT EXTRACTION STATUS, UNSPECIFIED EYE: Chronic | ICD-10-CM

## 2021-11-07 DIAGNOSIS — Z11.52 ENCOUNTER FOR SCREENING FOR COVID-19: ICD-10-CM

## 2021-11-07 LAB — SARS-COV-2 RNA SPEC QL NAA+PROBE: SIGNIFICANT CHANGE UP

## 2021-11-07 PROCEDURE — C9803: CPT

## 2021-11-07 PROCEDURE — U0005: CPT

## 2021-11-07 PROCEDURE — U0003: CPT

## 2021-11-09 ENCOUNTER — NON-APPOINTMENT (OUTPATIENT)
Age: 76
End: 2021-11-09

## 2021-11-09 ENCOUNTER — TRANSCRIPTION ENCOUNTER (OUTPATIENT)
Age: 76
End: 2021-11-09

## 2021-11-10 ENCOUNTER — APPOINTMENT (OUTPATIENT)
Dept: UROLOGY | Facility: HOSPITAL | Age: 76
End: 2021-11-10

## 2021-11-10 ENCOUNTER — OUTPATIENT (OUTPATIENT)
Dept: INPATIENT UNIT | Facility: HOSPITAL | Age: 76
LOS: 1 days | End: 2021-11-10
Payer: COMMERCIAL

## 2021-11-10 ENCOUNTER — RESULT REVIEW (OUTPATIENT)
Age: 76
End: 2021-11-10

## 2021-11-10 VITALS
RESPIRATION RATE: 18 BRPM | SYSTOLIC BLOOD PRESSURE: 122 MMHG | WEIGHT: 177.03 LBS | HEIGHT: 66 IN | HEART RATE: 86 BPM | DIASTOLIC BLOOD PRESSURE: 75 MMHG | TEMPERATURE: 98 F | OXYGEN SATURATION: 97 %

## 2021-11-10 DIAGNOSIS — Z98.49 CATARACT EXTRACTION STATUS, UNSPECIFIED EYE: Chronic | ICD-10-CM

## 2021-11-10 DIAGNOSIS — Z96.0 PRESENCE OF UROGENITAL IMPLANTS: Chronic | ICD-10-CM

## 2021-11-10 DIAGNOSIS — N20.1 CALCULUS OF URETER: ICD-10-CM

## 2021-11-10 PROCEDURE — 52315 CYSTOSCOPY AND TREATMENT: CPT | Mod: RT,59

## 2021-11-10 PROCEDURE — 74420 UROGRAPHY RTRGR +-KUB: CPT | Mod: 26

## 2021-11-10 PROCEDURE — 52317 REMOVE BLADDER STONE: CPT

## 2021-11-10 PROCEDURE — 88300 SURGICAL PATH GROSS: CPT | Mod: 26

## 2021-11-10 RX ORDER — SODIUM CHLORIDE 9 MG/ML
1000 INJECTION, SOLUTION INTRAVENOUS
Refills: 0 | Status: DISCONTINUED | OUTPATIENT
Start: 2021-11-10 | End: 2021-11-11

## 2021-11-10 RX ORDER — LIDOCAINE HCL 20 MG/ML
0.2 VIAL (ML) INJECTION ONCE
Refills: 0 | Status: DISCONTINUED | OUTPATIENT
Start: 2021-11-10 | End: 2021-11-10

## 2021-11-10 RX ORDER — SODIUM CHLORIDE 9 MG/ML
3 INJECTION INTRAMUSCULAR; INTRAVENOUS; SUBCUTANEOUS EVERY 8 HOURS
Refills: 0 | Status: DISCONTINUED | OUTPATIENT
Start: 2021-11-10 | End: 2021-11-10

## 2021-11-10 RX ORDER — ONDANSETRON 8 MG/1
4 TABLET, FILM COATED ORAL ONCE
Refills: 0 | Status: DISCONTINUED | OUTPATIENT
Start: 2021-11-10 | End: 2021-11-11

## 2021-11-10 RX ORDER — ACETAMINOPHEN 500 MG
650 TABLET ORAL EVERY 6 HOURS
Refills: 0 | Status: DISCONTINUED | OUTPATIENT
Start: 2021-11-10 | End: 2021-11-24

## 2021-11-10 RX ORDER — CEFTRIAXONE 500 MG/1
1000 INJECTION, POWDER, FOR SOLUTION INTRAMUSCULAR; INTRAVENOUS EVERY 24 HOURS
Refills: 0 | Status: DISCONTINUED | OUTPATIENT
Start: 2021-11-10 | End: 2021-11-24

## 2021-11-10 RX ORDER — HEPARIN SODIUM 5000 [USP'U]/ML
5000 INJECTION INTRAVENOUS; SUBCUTANEOUS EVERY 8 HOURS
Refills: 0 | Status: DISCONTINUED | OUTPATIENT
Start: 2021-11-10 | End: 2021-11-24

## 2021-11-10 RX ORDER — AMPICILLIN TRIHYDRATE 250 MG
CAPSULE ORAL
Refills: 0 | Status: DISCONTINUED | OUTPATIENT
Start: 2021-11-10 | End: 2021-11-10

## 2021-11-10 RX ORDER — AMPICILLIN TRIHYDRATE 250 MG
500 CAPSULE ORAL EVERY 6 HOURS
Refills: 0 | Status: DISCONTINUED | OUTPATIENT
Start: 2021-11-10 | End: 2021-11-11

## 2021-11-10 RX ORDER — AMPICILLIN TRIHYDRATE 250 MG
500 CAPSULE ORAL ONCE
Refills: 0 | Status: DISCONTINUED | OUTPATIENT
Start: 2021-11-10 | End: 2021-11-10

## 2021-11-10 RX ORDER — ASCORBIC ACID 60 MG
500 TABLET,CHEWABLE ORAL DAILY
Refills: 0 | Status: DISCONTINUED | OUTPATIENT
Start: 2021-11-10 | End: 2021-11-24

## 2021-11-10 RX ORDER — HYDROMORPHONE HYDROCHLORIDE 2 MG/ML
0.2 INJECTION INTRAMUSCULAR; INTRAVENOUS; SUBCUTANEOUS
Refills: 0 | Status: DISCONTINUED | OUTPATIENT
Start: 2021-11-10 | End: 2021-11-11

## 2021-11-10 RX ORDER — TAMSULOSIN HYDROCHLORIDE 0.4 MG/1
0.4 CAPSULE ORAL AT BEDTIME
Refills: 0 | Status: DISCONTINUED | OUTPATIENT
Start: 2021-11-10 | End: 2021-11-24

## 2021-11-10 RX ORDER — FINASTERIDE 5 MG/1
5 TABLET, FILM COATED ORAL DAILY
Refills: 0 | Status: DISCONTINUED | OUTPATIENT
Start: 2021-11-10 | End: 2021-11-24

## 2021-11-10 RX ORDER — AMPICILLIN TRIHYDRATE 250 MG
500 CAPSULE ORAL EVERY 6 HOURS
Refills: 0 | Status: DISCONTINUED | OUTPATIENT
Start: 2021-11-10 | End: 2021-11-10

## 2021-11-10 RX ORDER — SENNA PLUS 8.6 MG/1
2 TABLET ORAL AT BEDTIME
Refills: 0 | Status: DISCONTINUED | OUTPATIENT
Start: 2021-11-10 | End: 2021-11-24

## 2021-11-10 RX ADMIN — Medication 104 MILLIGRAM(S): at 13:28

## 2021-11-10 RX ADMIN — TAMSULOSIN HYDROCHLORIDE 0.4 MILLIGRAM(S): 0.4 CAPSULE ORAL at 21:52

## 2021-11-10 RX ADMIN — CEFTRIAXONE 100 MILLIGRAM(S): 500 INJECTION, POWDER, FOR SOLUTION INTRAMUSCULAR; INTRAVENOUS at 15:48

## 2021-11-10 RX ADMIN — Medication 104 MILLIGRAM(S): at 21:52

## 2021-11-10 RX ADMIN — HEPARIN SODIUM 5000 UNIT(S): 5000 INJECTION INTRAVENOUS; SUBCUTANEOUS at 21:54

## 2021-11-10 RX ADMIN — FINASTERIDE 5 MILLIGRAM(S): 5 TABLET, FILM COATED ORAL at 13:19

## 2021-11-10 RX ADMIN — HEPARIN SODIUM 5000 UNIT(S): 5000 INJECTION INTRAVENOUS; SUBCUTANEOUS at 13:19

## 2021-11-10 RX ADMIN — SODIUM CHLORIDE 75 MILLILITER(S): 9 INJECTION, SOLUTION INTRAVENOUS at 22:00

## 2021-11-10 RX ADMIN — Medication 500 MILLIGRAM(S): at 13:21

## 2021-11-10 RX ADMIN — SODIUM CHLORIDE 3 MILLILITER(S): 9 INJECTION INTRAMUSCULAR; INTRAVENOUS; SUBCUTANEOUS at 06:05

## 2021-11-10 RX ADMIN — SENNA PLUS 2 TABLET(S): 8.6 TABLET ORAL at 21:52

## 2021-11-10 NOTE — BRIEF OPERATIVE NOTE - NSICDXBRIEFPROCEDURE_GEN_ALL_CORE_FT
PROCEDURES:  Simple cystolitholapaxy 10-Nov-2021 09:50:11  Obi Alvarado  Cystourethroscopy with lithotripsy 10-Nov-2021 09:50:17  Obi Alvarado

## 2021-11-10 NOTE — ASU PATIENT PROFILE, ADULT - NSICDXPASTMEDICALHX_GEN_ALL_CORE_FT
PAST MEDICAL HISTORY:  BPH (benign prostatic hyperplasia)     Calculus of ureter right    E coli bacteremia 9/2021 - right nephrolithiasis with right ureteral stent placed, treated with Zosyn in hospital, discharged on Bactrim    Epididymoorchitis 10/2021 - on Cipro 500 mg twice daily

## 2021-11-10 NOTE — ASU PATIENT PROFILE, ADULT - NSICDXPASTSURGICALHX_GEN_ALL_CORE_FT
PAST SURGICAL HISTORY:  Prostate Cancer Screening biopsy    S/P cataract surgery 2011 - bilateral eyes    S/P ureteral stent placement 9/5/2021

## 2021-11-10 NOTE — ASU PREOP CHECKLIST - SIDE RAILS UP
Pt scheduled for D&E on 12/13/19. Discussed surgery prep with patient, and informed need for misoprostol prior to surgery. Sent to pt preferred pharmacy.     Pt's questions asked and answered.  
n/a

## 2021-11-11 VITALS
HEART RATE: 74 BPM | RESPIRATION RATE: 18 BRPM | SYSTOLIC BLOOD PRESSURE: 125 MMHG | OXYGEN SATURATION: 100 % | DIASTOLIC BLOOD PRESSURE: 63 MMHG

## 2021-11-11 PROCEDURE — C1769: CPT

## 2021-11-11 PROCEDURE — 52318 REMOVE BLADDER STONE: CPT

## 2021-11-11 PROCEDURE — C1889: CPT

## 2021-11-11 PROCEDURE — 52352 CYSTOURETERO W/STONE REMOVE: CPT | Mod: RT,XS

## 2021-11-11 PROCEDURE — 82365 CALCULUS SPECTROSCOPY: CPT

## 2021-11-11 PROCEDURE — 76000 FLUOROSCOPY <1 HR PHYS/QHP: CPT

## 2021-11-11 PROCEDURE — C1758: CPT

## 2021-11-11 PROCEDURE — 88300 SURGICAL PATH GROSS: CPT

## 2021-11-11 RX ORDER — AMPICILLIN TRIHYDRATE 250 MG
1 CAPSULE ORAL EVERY 6 HOURS
Refills: 0 | Status: DISCONTINUED | OUTPATIENT
Start: 2021-11-11 | End: 2021-11-24

## 2021-11-11 RX ADMIN — Medication 108 GRAM(S): at 05:16

## 2021-11-11 RX ADMIN — HEPARIN SODIUM 5000 UNIT(S): 5000 INJECTION INTRAVENOUS; SUBCUTANEOUS at 06:14

## 2021-11-11 NOTE — PROGRESS NOTE ADULT - SUBJECTIVE AND OBJECTIVE BOX
Post op Check    Pt seen and examined without complaints. Pain is controlled. Denies SOB/CP/N/V.     Vital Signs Last 24 Hrs  T(C): 36.5 (10 Nov 2021 12:00), Max: 36.5 (10 Nov 2021 06:05)  T(F): 97.7 (10 Nov 2021 12:00), Max: 97.7 (10 Nov 2021 06:05)  HR: 95 (10 Nov 2021 12:00) (75 - 95)  BP: 159/65 (10 Nov 2021 12:00) (119/63 - 159/65)  BP(mean): 94 (10 Nov 2021 12:00) (84 - 103)  RR: 18 (10 Nov 2021 12:00) (16 - 18)  SpO2: 100% (10 Nov 2021 12:00) (95% - 100%)    I&O's Summary    10 Nov 2021 07:01  -  10 Nov 2021 15:21  --------------------------------------------------------  IN: 725 mL / OUT: 750 mL / NET: -25 mL        Physical Exam  Gen: NAD  Pulm: No respiratory distress, no subcostal retractions  CV: RRR, no JVD  Abd: Soft, NT, ND  : Voided urine light pink      A/P: 76y Male s/p Cystolitholapaxy, R URS stone and stent removal   DVT prophylaxis/OOB  Incentive spirometry  Strict I&O's  Analgesia and antiemetics as needed  Diet- regular  Monitor for fevers  Cont Abx    
Urology PA Progress Note:    Subjective:  Patient seen and examined at bedside. No acute events overnight. No complaints. Voiding multiple times. Last void 300cc, PVR 200cc. Pain controlled. No f/c/n/v    Objective:  Vital signs  T(F): , Max: 99 (11-11-21 @ 04:00)  HR: 65 (11-11-21 @ 06:00)  BP: 115/62 (11-11-21 @ 06:00)  SpO2: 99% (11-11-21 @ 06:00)  Wt(kg): --    Output     11-10 @ 07:01  -  11-11 @ 07:00  --------------------------------------------------------  IN: 2765 mL / OUT: 2870 mL / NET: -105 mL    11-11 @ 07:01  -  11-11 @ 07:13  --------------------------------------------------------  IN: 75 mL / OUT: 0 mL / NET: 75 mL        Physical Exam:  Gen: NAD. AAOx3  Pulm: nonlabored  Abd: soft, NT/ND. No r/g/r  : voiding    :

## 2021-11-11 NOTE — ASU DISCHARGE PLAN (ADULT/PEDIATRIC) - CARE PROVIDER_API CALL
Obi Alvarado)  Urology  [facility] - Dept of Urology, [address] [place] [address]  Phone: [phone]  Fax: [phone]  Follow Up Time:

## 2021-11-11 NOTE — PROGRESS NOTE ADULT - ASSESSMENT
A/P: 76 year old male s/p cystolitholapaxy, R URS, LL, stent placement    - Diet  - pain control prn  - c/w abx  - OOB/ambulate  - Encourage incentive spirometry  - monitor for fevers  - strict I&Os  - DVT ppx  - Dispo planning home today

## 2021-11-11 NOTE — ASU DISCHARGE PLAN (ADULT/PEDIATRIC) - ASU DC SPECIAL INSTRUCTIONSFT
PAIN CONTROL: You may take over the counter Tylenol and ibuprofen as needed for pain    ANTIBIOTICS: Please complete the course of antibiotics sent your pharmacy as instructed.    ACTIVITY: No heavy lifting or straining. Otherwise, you may return to your usual level of physical activity. Avoid constipation    DIET: Return to your usual diet. Drink plenty of fluids    NOTIFY YOUR SURGEON IF: You have any bleeding that does not stop, any fevers (over 100.4F) or chills, persistent nausea/vomiting, persistent diarrhea, pain not controlled or other worrisome symptoms arise.    FOLLOW UP:  1. Follow up with Dr. Alvarado in 1-2 weeks PAIN CONTROL: You may take over the counter Tylenol and ibuprofen as needed for pain    MEDICATIONS: Please complete the course of antibiotics sent your pharmacy as instructed. Continue taking flomax daikly    ACTIVITY: No heavy lifting or straining. Otherwise, you may return to your usual level of physical activity. Avoid constipation    DIET: Return to your usual diet. Drink plenty of fluids    NOTIFY YOUR SURGEON IF: You have any bleeding that does not stop, any fevers (over 100.4F) or chills, persistent nausea/vomiting, persistent diarrhea, pain not controlled or other worrisome symptoms arise.    FOLLOW UP:  1. Follow up with Dr. Alvarado in 1-2 weeks

## 2021-11-12 ENCOUNTER — NON-APPOINTMENT (OUTPATIENT)
Age: 76
End: 2021-11-12

## 2021-11-13 LAB — SURGICAL PATHOLOGY STUDY: SIGNIFICANT CHANGE UP

## 2021-11-17 LAB
NIDUS STONE QN: SIGNIFICANT CHANGE UP
NIDUS STONE QN: SIGNIFICANT CHANGE UP

## 2021-11-22 ENCOUNTER — APPOINTMENT (OUTPATIENT)
Dept: UROLOGY | Facility: CLINIC | Age: 76
End: 2021-11-22
Payer: MEDICARE

## 2021-11-22 PROCEDURE — 99214 OFFICE O/P EST MOD 30 MIN: CPT

## 2021-11-22 NOTE — PHYSICAL EXAM
[General Appearance - Well Developed] : well developed [General Appearance - Well Nourished] : well nourished [Bowel Sounds] : normal bowel sounds [Abdomen Soft] : soft [Skin Color & Pigmentation] : normal skin color and pigmentation [Skin Turgor] : supple [Heart Rate And Rhythm] : Heart rate and rhythm were normal [] : no respiratory distress [Respiration, Rhythm And Depth] : normal respiratory rhythm and effort [Oriented To Time, Place, And Person] : oriented to person, place, and time [Not Anxious] : not anxious [Normal Station and Gait] : the gait and station were normal for the patient's age [No Focal Deficits] : no focal deficits

## 2021-11-22 NOTE — HISTORY OF PRESENT ILLNESS
[FreeTextEntry1] : HPI: Mr. Storey is a 76 year old male with hx of BPH, nephrolithiasis presented to the ED with AMS, \par fevers and right flank pain. Patient found to have 7mm right distal ureteral  stone on CT scan. Patient emergently taken to the OR 9/5 for cystoscopy, right ureteral stent placement. Urine and blood cultures grew E.Coli on Zosyn IV. Repeat blood cultures no growth to date. Also found to have a 2-3 cm bladder stone. No nausea/vomiting, but has had fever at home to 102 since Saturday but did not want to go to the emergency room. \par \par PVR today 35 cc. Nocturia previously 3-4x. Had prevoiusly seen Dr. Pringle with PSA to 10.8, mpMRI PIRADS1 lesion noted, 145g gland. \par \par Anticoagulation: Previously on ASA\par All: None\par Social: lives at home, never smoker, never ETOH\par PMHx: BPH, nephrolithiasis\par FHx: gastric cancer\par PSHx: No prior surgeries\par Labs: Cr 0.9 9/6, UCx with E. coli, PIRADS1 lesion \par Imaging: CT in PACS with 7 mm ureteral stone, 2 cm bladder stone\par \par 11/4:\par Here after admissoin to hospital. He was found to have R epididymoorchitis. WBC up to 16. He was found to have cardiology inferior infarct, seeing cardiology tomorrow. Finished cipro. Echo in hospital WNL, but needed stress test for clearance. \par \par 11/22:\par Epididymoorchitis resolved, s/p cystolitholapaxy and R URS. Here today to discuss prostate management. No issues at home, right flank significantly improved. Discussed stone composition 100% COM. \par  [Urinary Incontinence] : no urinary incontinence [Urinary Retention] : no urinary retention [Urinary Urgency] : no urinary urgency [Urinary Frequency] : no urinary frequency

## 2021-11-22 NOTE — ASSESSMENT
[FreeTextEntry1] : 75 yo M with bladder calculus and right distal ureter here with BPH who would like to proceed with aquaablation

## 2021-12-27 DIAGNOSIS — Z01.818 ENCOUNTER FOR OTHER PREPROCEDURAL EXAMINATION: ICD-10-CM

## 2021-12-28 LAB — SARS-COV-2 N GENE NPH QL NAA+PROBE: NOT DETECTED

## 2021-12-29 ENCOUNTER — TRANSCRIPTION ENCOUNTER (OUTPATIENT)
Age: 76
End: 2021-12-29

## 2021-12-29 VITALS
WEIGHT: 183.42 LBS | OXYGEN SATURATION: 97 % | DIASTOLIC BLOOD PRESSURE: 78 MMHG | HEIGHT: 66 IN | HEART RATE: 79 BPM | TEMPERATURE: 98 F | RESPIRATION RATE: 16 BRPM | SYSTOLIC BLOOD PRESSURE: 134 MMHG

## 2021-12-29 NOTE — PATIENT PROFILE ADULT - VISION (WITH CORRECTIVE LENSES IF THE PATIENT USUALLY WEARS THEM):
Hospital Course:  Per admitting physician:   HPI:  68 yo male with no past medical history comes to ED after episode of dizziness that occurred while sitting at the table having breakfast. Patient had 1 other mild episode Wed that passed on its own. Today he had this sudden feeling of not feeling right and reports the room spinning. This occurs intermittently, with change in vision different from his baseline, and he has some nausea. He denies any change in eating habits. There was no vomiting, chest pan, abdominal pain, or shortness of breath. He denies any sick contacts, ringing of his ears, change in bowel or urinary habits.    (02 Oct 2021 15:05)    Medical Floor Course:  ALICE ULLOA was admitted to medical floor under the impression of dizziness. Initial CT imaging in ER of head, angio neck were unremarkable. Dizziness improved. Neurology was consulted, recommended repeat head CT which was unremarkable, addition of Aspirin 81mg daily. LDL elevated at 117, statin started. HbA1C 6.1%. Cardiology was consulted, recommended outpatient stress test and follow up.  Patient hemodynamically stable for discharge with appropriate PCP and Cardiology follow up.     Discharging Provider:  Marcel Solomon MD  Contact Info: Cell [phone] - Please call with any questions or concerns.    Outpatient Provider: Dr. Aurelio PURCELL, the attending physician, was physically present for the key portions of the evaluation and management (E/M) service provided. The total amount of time spent reviewing the hospital course, laboratory values, imaging findings, assessing/counseling the patient, discussing with consultant physicians, social work, nursing staff was 32 minutes.   
Normal vision: sees adequately in most situations; can see medication labels, newsprint

## 2021-12-29 NOTE — PATIENT PROFILE ADULT - FALL HARM RISK - UNIVERSAL INTERVENTIONS
Bed in lowest position, wheels locked, appropriate side rails in place/Call bell, personal items and telephone in reach/Instruct patient to call for assistance before getting out of bed or chair/Non-slip footwear when patient is out of bed/[place] to call system/Physically safe environment - no spills, clutter or unnecessary equipment/Purposeful Proactive Rounding/Room/bathroom lighting operational, light cord in reach

## 2021-12-30 ENCOUNTER — APPOINTMENT (OUTPATIENT)
Dept: UROLOGY | Facility: HOSPITAL | Age: 76
End: 2021-12-30

## 2021-12-30 ENCOUNTER — RESULT REVIEW (OUTPATIENT)
Age: 76
End: 2021-12-30

## 2021-12-30 ENCOUNTER — TRANSCRIPTION ENCOUNTER (OUTPATIENT)
Age: 76
End: 2021-12-30

## 2021-12-30 ENCOUNTER — INPATIENT (INPATIENT)
Facility: HOSPITAL | Age: 76
LOS: 0 days | Discharge: HOME | DRG: 714 | End: 2021-12-31
Attending: UROLOGY | Admitting: UROLOGY
Payer: MEDICARE

## 2021-12-30 DIAGNOSIS — Z96.0 PRESENCE OF UROGENITAL IMPLANTS: Chronic | ICD-10-CM

## 2021-12-30 DIAGNOSIS — Z98.49 CATARACT EXTRACTION STATUS, UNSPECIFIED EYE: Chronic | ICD-10-CM

## 2021-12-30 DIAGNOSIS — Z98.890 OTHER SPECIFIED POSTPROCEDURAL STATES: Chronic | ICD-10-CM

## 2021-12-30 LAB
ANION GAP SERPL CALC-SCNC: 8 MMOL/L — SIGNIFICANT CHANGE UP (ref 5–17)
BUN SERPL-MCNC: 11 MG/DL — SIGNIFICANT CHANGE UP (ref 7–23)
CALCIUM SERPL-MCNC: 8.8 MG/DL — SIGNIFICANT CHANGE UP (ref 8.4–10.5)
CHLORIDE SERPL-SCNC: 107 MMOL/L — SIGNIFICANT CHANGE UP (ref 96–108)
CO2 SERPL-SCNC: 23 MMOL/L — SIGNIFICANT CHANGE UP (ref 22–31)
CREAT SERPL-MCNC: 0.69 MG/DL — SIGNIFICANT CHANGE UP (ref 0.5–1.3)
GLUCOSE SERPL-MCNC: 106 MG/DL — HIGH (ref 70–99)
HCT VFR BLD CALC: 41.3 % — SIGNIFICANT CHANGE UP (ref 39–50)
HGB BLD-MCNC: 13.2 G/DL — SIGNIFICANT CHANGE UP (ref 13–17)
MCHC RBC-ENTMCNC: 27.5 PG — SIGNIFICANT CHANGE UP (ref 27–34)
MCHC RBC-ENTMCNC: 32 GM/DL — SIGNIFICANT CHANGE UP (ref 32–36)
MCV RBC AUTO: 86 FL — SIGNIFICANT CHANGE UP (ref 80–100)
NRBC # BLD: 0 /100 WBCS — SIGNIFICANT CHANGE UP (ref 0–0)
PLATELET # BLD AUTO: 288 K/UL — SIGNIFICANT CHANGE UP (ref 150–400)
POTASSIUM SERPL-MCNC: 4.5 MMOL/L — SIGNIFICANT CHANGE UP (ref 3.5–5.3)
POTASSIUM SERPL-SCNC: 4.5 MMOL/L — SIGNIFICANT CHANGE UP (ref 3.5–5.3)
RBC # BLD: 4.8 M/UL — SIGNIFICANT CHANGE UP (ref 4.2–5.8)
RBC # FLD: 14.6 % — HIGH (ref 10.3–14.5)
SODIUM SERPL-SCNC: 138 MMOL/L — SIGNIFICANT CHANGE UP (ref 135–145)
WBC # BLD: 6.33 K/UL — SIGNIFICANT CHANGE UP (ref 3.8–10.5)
WBC # FLD AUTO: 6.33 K/UL — SIGNIFICANT CHANGE UP (ref 3.8–10.5)

## 2021-12-30 PROCEDURE — 88305 TISSUE EXAM BY PATHOLOGIST: CPT | Mod: 26

## 2021-12-30 PROCEDURE — 0421T: CPT

## 2021-12-30 RX ORDER — OXYBUTYNIN CHLORIDE 5 MG
5 TABLET ORAL EVERY 8 HOURS
Refills: 0 | Status: DISCONTINUED | OUTPATIENT
Start: 2021-12-30 | End: 2021-12-31

## 2021-12-30 RX ORDER — GABAPENTIN 400 MG/1
300 CAPSULE ORAL EVERY 8 HOURS
Refills: 0 | Status: DISCONTINUED | OUTPATIENT
Start: 2021-12-30 | End: 2021-12-31

## 2021-12-30 RX ORDER — POLYETHYLENE GLYCOL 3350 17 G/17G
17 POWDER, FOR SOLUTION ORAL DAILY
Refills: 0 | Status: DISCONTINUED | OUTPATIENT
Start: 2021-12-30 | End: 2021-12-31

## 2021-12-30 RX ORDER — TAMSULOSIN HYDROCHLORIDE 0.4 MG/1
1 CAPSULE ORAL
Qty: 0 | Refills: 0 | DISCHARGE

## 2021-12-30 RX ORDER — ACETAMINOPHEN 500 MG
650 TABLET ORAL EVERY 6 HOURS
Refills: 0 | Status: DISCONTINUED | OUTPATIENT
Start: 2021-12-30 | End: 2021-12-31

## 2021-12-30 RX ORDER — CIPROFLOXACIN LACTATE 400MG/40ML
400 VIAL (ML) INTRAVENOUS EVERY 12 HOURS
Refills: 0 | Status: DISCONTINUED | OUTPATIENT
Start: 2021-12-31 | End: 2021-12-31

## 2021-12-30 RX ORDER — BENZOCAINE AND MENTHOL 5; 1 G/100ML; G/100ML
1 LIQUID ORAL EVERY 4 HOURS
Refills: 0 | Status: DISCONTINUED | OUTPATIENT
Start: 2021-12-30 | End: 2021-12-31

## 2021-12-30 RX ORDER — BENZOCAINE AND MENTHOL 5; 1 G/100ML; G/100ML
1 LIQUID ORAL EVERY 4 HOURS
Refills: 0 | Status: DISCONTINUED | OUTPATIENT
Start: 2021-12-30 | End: 2021-12-30

## 2021-12-30 RX ORDER — KETOROLAC TROMETHAMINE 30 MG/ML
15 SYRINGE (ML) INJECTION EVERY 8 HOURS
Refills: 0 | Status: DISCONTINUED | OUTPATIENT
Start: 2021-12-30 | End: 2021-12-31

## 2021-12-30 RX ORDER — ATROPA BELLADONNA AND OPIUM 16.2; 6 MG/1; MG/1
1 SUPPOSITORY RECTAL EVERY 8 HOURS
Refills: 0 | Status: DISCONTINUED | OUTPATIENT
Start: 2021-12-30 | End: 2021-12-31

## 2021-12-30 RX ORDER — FINASTERIDE 5 MG/1
1 TABLET, FILM COATED ORAL
Qty: 0 | Refills: 0 | DISCHARGE

## 2021-12-30 RX ORDER — ONDANSETRON 8 MG/1
4 TABLET, FILM COATED ORAL EVERY 6 HOURS
Refills: 0 | Status: DISCONTINUED | OUTPATIENT
Start: 2021-12-30 | End: 2021-12-31

## 2021-12-30 RX ORDER — SENNA PLUS 8.6 MG/1
2 TABLET ORAL AT BEDTIME
Refills: 0 | Status: DISCONTINUED | OUTPATIENT
Start: 2021-12-30 | End: 2021-12-31

## 2021-12-30 RX ORDER — LIDOCAINE 4 G/100G
2 CREAM TOPICAL DAILY
Refills: 0 | Status: DISCONTINUED | OUTPATIENT
Start: 2021-12-30 | End: 2021-12-31

## 2021-12-30 RX ORDER — ASCORBIC ACID 60 MG
2 TABLET,CHEWABLE ORAL
Qty: 0 | Refills: 0 | DISCHARGE

## 2021-12-30 RX ADMIN — Medication 650 MILLIGRAM(S): at 18:15

## 2021-12-30 RX ADMIN — Medication 650 MILLIGRAM(S): at 22:55

## 2021-12-30 RX ADMIN — Medication 15 MILLIGRAM(S): at 23:10

## 2021-12-30 RX ADMIN — Medication 5 MILLIGRAM(S): at 13:50

## 2021-12-30 RX ADMIN — GABAPENTIN 300 MILLIGRAM(S): 400 CAPSULE ORAL at 22:51

## 2021-12-30 RX ADMIN — GABAPENTIN 300 MILLIGRAM(S): 400 CAPSULE ORAL at 14:12

## 2021-12-30 RX ADMIN — ATROPA BELLADONNA AND OPIUM 1 SUPPOSITORY(S): 16.2; 6 SUPPOSITORY RECTAL at 15:21

## 2021-12-30 RX ADMIN — Medication 15 MILLIGRAM(S): at 14:12

## 2021-12-30 RX ADMIN — Medication 15 MILLIGRAM(S): at 22:50

## 2021-12-30 RX ADMIN — Medication 200 MILLIGRAM(S): at 23:23

## 2021-12-30 RX ADMIN — Medication 15 MILLIGRAM(S): at 15:21

## 2021-12-30 RX ADMIN — Medication 650 MILLIGRAM(S): at 17:36

## 2021-12-30 RX ADMIN — ATROPA BELLADONNA AND OPIUM 1 SUPPOSITORY(S): 16.2; 6 SUPPOSITORY RECTAL at 15:17

## 2021-12-30 RX ADMIN — LIDOCAINE 2 PATCH: 4 CREAM TOPICAL at 13:40

## 2021-12-30 RX ADMIN — Medication 650 MILLIGRAM(S): at 23:22

## 2021-12-30 RX ADMIN — BENZOCAINE AND MENTHOL 1 LOZENGE: 5; 1 LIQUID ORAL at 22:50

## 2021-12-30 NOTE — BRIEF OPERATIVE NOTE - NSICDXBRIEFPROCEDURE_GEN_ALL_CORE_FT
PROCEDURES:  Cystoscopy with transurethral resection of prostate (TURP) using aquablation technique 30-Dec-2021 12:47:40  Andrew Perez

## 2021-12-30 NOTE — PACU DISCHARGE NOTE - COMMENTS
Pt AxO x4.  Some complaints of pain 3/10. V/S stable. 3 way cath with continuous bladder irrigation. Pinkish urine with scan amts small clots. Report given to regional floor RN. Transported via bed with 2 PCAs.

## 2021-12-30 NOTE — ASU PATIENT PROFILE, ADULT - FALL HARM RISK - UNIVERSAL INTERVENTIONS
Bed in lowest position, wheels locked, appropriate side rails in place/Call bell, personal items and telephone in reach/Instruct patient to call for assistance before getting out of bed or chair/Non-slip footwear when patient is out of bed/[place] to call system/Physically safe environment - no spills, clutter or unnecessary equipment/Purposeful Proactive Rounding/Room/bathroom lighting operational, light cord in reach

## 2021-12-30 NOTE — ASU PATIENT PROFILE, ADULT - NSICDXPASTSURGICALHX_GEN_ALL_CORE_FT
PAST SURGICAL HISTORY:  History of prostate biopsy     S/P cataract surgery 2011 - bilateral eyes    S/P ureteral stent placement 9/5/2021

## 2021-12-31 ENCOUNTER — TRANSCRIPTION ENCOUNTER (OUTPATIENT)
Age: 76
End: 2021-12-31

## 2021-12-31 VITALS
RESPIRATION RATE: 17 BRPM | DIASTOLIC BLOOD PRESSURE: 71 MMHG | SYSTOLIC BLOOD PRESSURE: 132 MMHG | OXYGEN SATURATION: 96 % | HEART RATE: 75 BPM | TEMPERATURE: 98 F

## 2021-12-31 DIAGNOSIS — N40.0 BENIGN PROSTATIC HYPERPLASIA WITHOUT LOWER URINARY TRACT SYMPTOMS: ICD-10-CM

## 2021-12-31 LAB
ANION GAP SERPL CALC-SCNC: 9 MMOL/L — SIGNIFICANT CHANGE UP (ref 5–17)
BUN SERPL-MCNC: 25 MG/DL — HIGH (ref 7–23)
CALCIUM SERPL-MCNC: 9.1 MG/DL — SIGNIFICANT CHANGE UP (ref 8.4–10.5)
CHLORIDE SERPL-SCNC: 104 MMOL/L — SIGNIFICANT CHANGE UP (ref 96–108)
CO2 SERPL-SCNC: 23 MMOL/L — SIGNIFICANT CHANGE UP (ref 22–31)
CREAT SERPL-MCNC: 1.18 MG/DL — SIGNIFICANT CHANGE UP (ref 0.5–1.3)
GLUCOSE SERPL-MCNC: 236 MG/DL — HIGH (ref 70–99)
HCT VFR BLD CALC: 37.5 % — LOW (ref 39–50)
HGB BLD-MCNC: 12.3 G/DL — LOW (ref 13–17)
MAGNESIUM SERPL-MCNC: 1.8 MG/DL — SIGNIFICANT CHANGE UP (ref 1.6–2.6)
MCHC RBC-ENTMCNC: 27.9 PG — SIGNIFICANT CHANGE UP (ref 27–34)
MCHC RBC-ENTMCNC: 32.8 GM/DL — SIGNIFICANT CHANGE UP (ref 32–36)
MCV RBC AUTO: 85 FL — SIGNIFICANT CHANGE UP (ref 80–100)
NRBC # BLD: 0 /100 WBCS — SIGNIFICANT CHANGE UP (ref 0–0)
PHOSPHATE SERPL-MCNC: 3 MG/DL — SIGNIFICANT CHANGE UP (ref 2.5–4.5)
PLATELET # BLD AUTO: 301 K/UL — SIGNIFICANT CHANGE UP (ref 150–400)
POTASSIUM SERPL-MCNC: 4.4 MMOL/L — SIGNIFICANT CHANGE UP (ref 3.5–5.3)
POTASSIUM SERPL-SCNC: 4.4 MMOL/L — SIGNIFICANT CHANGE UP (ref 3.5–5.3)
RBC # BLD: 4.41 M/UL — SIGNIFICANT CHANGE UP (ref 4.2–5.8)
RBC # FLD: 14.2 % — SIGNIFICANT CHANGE UP (ref 10.3–14.5)
SARS-COV-2 RNA SPEC QL NAA+PROBE: SIGNIFICANT CHANGE UP
SODIUM SERPL-SCNC: 136 MMOL/L — SIGNIFICANT CHANGE UP (ref 135–145)
WBC # BLD: 13.45 K/UL — HIGH (ref 3.8–10.5)
WBC # FLD AUTO: 13.45 K/UL — HIGH (ref 3.8–10.5)

## 2021-12-31 RX ADMIN — Medication 15 MILLIGRAM(S): at 06:30

## 2021-12-31 RX ADMIN — Medication 650 MILLIGRAM(S): at 05:57

## 2021-12-31 RX ADMIN — LIDOCAINE 2 PATCH: 4 CREAM TOPICAL at 03:30

## 2021-12-31 RX ADMIN — GABAPENTIN 300 MILLIGRAM(S): 400 CAPSULE ORAL at 05:56

## 2021-12-31 RX ADMIN — Medication 650 MILLIGRAM(S): at 06:30

## 2021-12-31 RX ADMIN — SENNA PLUS 2 TABLET(S): 8.6 TABLET ORAL at 05:56

## 2021-12-31 RX ADMIN — Medication 15 MILLIGRAM(S): at 05:57

## 2021-12-31 NOTE — DISCHARGE NOTE PROVIDER - NSDCMRMEDTOKEN_GEN_ALL_CORE_FT
finasteride 5 mg oral tablet: 1 tab(s) orally once a day (at bedtime)  tamsulosin 0.4 mg oral capsule: 1 cap(s) orally once a day (at bedtime)  Vitamin C 500 mg oral tablet: 2 tab(s) orally once a day   amoxicillin-clavulanate 875 mg-125 mg oral tablet: 1 tab(s) orally every 12 hours MDD:1 Tablet  finasteride 5 mg oral tablet: 1 tab(s) orally once a day (at bedtime)  tamsulosin 0.4 mg oral capsule: 1 cap(s) orally once a day (at bedtime)  Vitamin C 500 mg oral tablet: 2 tab(s) orally once a day

## 2021-12-31 NOTE — DISCHARGE NOTE PROVIDER - NSDCCPTREATMENT_GEN_ALL_CORE_FT
PRINCIPAL PROCEDURE  Procedure: Cystoscopy with transurethral resection of prostate (TURP) using aquablation technique  Findings and Treatment:

## 2021-12-31 NOTE — DISCHARGE NOTE NURSING/CASE MANAGEMENT/SOCIAL WORK - PATIENT PORTAL LINK FT
You can access the FollowMyHealth Patient Portal offered by [facility] by registering at the following website: [email or web address]. By joining [facility]’s FollowMyHealth portal, you will also be able to view your health information using other applications (apps) compatible with our system.

## 2021-12-31 NOTE — DISCHARGE NOTE PROVIDER - NSDCCPCAREPLAN_GEN_ALL_CORE_FT
PRINCIPAL DISCHARGE DIAGNOSIS  Diagnosis: BPH (benign prostatic hyperplasia)  Assessment and Plan of Treatment: s/p TURP aquablation 12/30. Follow up with Dr Alvarado.

## 2021-12-31 NOTE — DISCHARGE NOTE PROVIDER - CARE PROVIDER_API CALL
Obi Alvarado)  Urology  [facility] - Dept of Urology, [address] [place] [address]  Phone: [phone]  Fax: [phone]  Follow Up Time:

## 2021-12-31 NOTE — DISCHARGE NOTE PROVIDER - HOSPITAL COURSE
NOTIFICATION RETURN TO WORK / SCHOOL 
 
8/24/2020 6:53 PM 
 
Ms. [address] [place] [address] To Whom It May Concern: 
 
Sergo Timmons is currently under the care of [address] Please allow 2 week quarantine/self isolation. If there are questions or concerns please have the patient contact our office. Sincerely, GHE PROVIDER 
 
                                
 
 76 year old male with history of BPH s/p TURP aquablation 12/30. Patient's surgical course without complications. Hospitalization course unremarkable. He is afberile, hemodynamically stable and optimized for discharge home.

## 2021-12-31 NOTE — PROGRESS NOTE ADULT - SUBJECTIVE AND OBJECTIVE BOX
POST OP NOTE:    Patient states has a slight urge to urinate but denies any pain,  denies any nausea or vomiting, denies fever or chills, denies SOB or CP.       12-30-21 @ 07:01  -  12-30-21 @ 15:28  --------------------------------------------------------  IN: 3000 mL / OUT: 3000 mL / NET: 0 mL      T(C): 36.2 (12-30-21 @ 12:52), Max: 36.2 (12-30-21 @ 12:52)  HR: 63 (12-30-21 @ 15:10) (58 - 77)  BP: 133/62 (12-30-21 @ 15:10) (106/89 - 166/82)  RR: 16 (12-30-21 @ 15:10) (11 - 40)  SpO2: 99% (12-30-21 @ 15:10) (96% - 100%)    GEN: NAD  ABD: Soft, ND, NT  : rodrigues draining clear on CBI        A/P 77 yo m with bph s/p aquablation   1) advance diet  2) cont IVF until tolerating PO  3)continue cbi  4)OOB
INTERVAL HPI/OVERNIGHT EVENTS:  No acute events overnight.    VITALS:    T(F): 97.6 (12-30-21 @ 20:38), Max: 97.6 (12-30-21 @ 20:38)  HR: 79 (12-30-21 @ 20:38) (58 - 79)  BP: 132/75 (12-30-21 @ 20:38) (106/89 - 166/82)  RR: 16 (12-30-21 @ 20:38) (11 - 40)  SpO2: 98% (12-30-21 @ 20:38) (96% - 100%)  Wt(kg): --    I&O's Detail    30 Dec 2021 07:01  -  31 Dec 2021 01:40  --------------------------------------------------------  IN:    Continuous Bladder Irrigation (mL): 3000 mL  Total IN: 3000 mL    OUT:    Continuous Bladder Irrigation (mL): 53258 mL  Total OUT: 02802 mL    Total NET: -00248 mL          MEDICATIONS:    ANTIBIOTICS:  ciprofloxacin   IVPB 400 milliGRAM(s) IV Intermittent every 12 hours      PAIN CONTROL:  acetaminophen     Tablet .. 650 milliGRAM(s) Oral every 6 hours  belladonna 16.2 mG/opium 30 mg Suppository 1 Suppository(s) Rectal every 8 hours PRN  gabapentin 300 milliGRAM(s) Oral every 8 hours  ketorolac   Injectable 15 milliGRAM(s) IV Push every 8 hours  ondansetron Injectable 4 milliGRAM(s) IV Push every 6 hours PRN       MEDS:  oxybutynin 5 milliGRAM(s) Oral every 8 hours PRN      HEME/ONC        PHYSICAL EXAM:  General: No acute distress.  Alert and Oriented  Abdominal Exam: soft, nontender, nondistended   Exam: 24 fr 3 way catheter in, CBI on, urine light pink      LABS:                        13.2   6.33  )-----------( 288      ( 30 Dec 2021 13:15 )             41.3     12-30    138  |  107  |  11  ----------------------------<  106<H>  4.5   |  23  |  0.69    Ca    8.8      30 Dec 2021 13:15

## 2021-12-31 NOTE — PROGRESS NOTE ADULT - PROBLEM SELECTOR PLAN 1
s/p TURP aquablation 12/30  -pain control  -bladder spasm regimen  -24fr 3 way catheter in CBI on, TOV in am if clear  -DVT prophylaxis  -OOB, IS  -Abx- Cipro  -Diet: reg  d/c planning

## 2022-01-03 ENCOUNTER — APPOINTMENT (OUTPATIENT)
Dept: UROLOGY | Facility: CLINIC | Age: 77
End: 2022-01-03

## 2022-01-04 LAB — SURGICAL PATHOLOGY STUDY: SIGNIFICANT CHANGE UP

## 2022-01-04 NOTE — ED PROVIDER NOTE - PROVIDER TOKENS
Wily is seen today for a 6 month hearing aid check. He reports that the right hearing aid is not as loud as his left hearing aid.    Otoscopic examination revealed clear canals, bilaterally.    Both instruments were cleaned and checked.  The filters and domes were replaced.  The hearing aids were found to be in good working order. They were re-programmed to increase the right overall volume X1. Wily reported feeling more balanced with these changes.    Wily will return in 6 months or sooner should any problems or concerns arise. At this time we will updated his hearing test. He purchased two packages of filters today.   PROVIDER:[TOKEN:[[id number]],FOLLOWUP:[1-3 Days]]

## 2022-01-06 ENCOUNTER — APPOINTMENT (OUTPATIENT)
Dept: UROLOGY | Facility: CLINIC | Age: 77
End: 2022-01-06
Payer: MEDICARE

## 2022-01-06 ENCOUNTER — APPOINTMENT (OUTPATIENT)
Dept: UROLOGY | Facility: CLINIC | Age: 77
End: 2022-01-06

## 2022-01-06 DIAGNOSIS — N40.1 BENIGN PROSTATIC HYPERPLASIA WITH LOWER URINARY TRACT SYMPTOMS: ICD-10-CM

## 2022-01-06 DIAGNOSIS — N32.0 BLADDER-NECK OBSTRUCTION: ICD-10-CM

## 2022-01-06 DIAGNOSIS — N40.0 BENIGN PROSTATIC HYPERPLASIA WITHOUT LOWER URINARY TRACT SYMPTOMS: ICD-10-CM

## 2022-01-06 PROCEDURE — 99211 OFF/OP EST MAY X REQ PHY/QHP: CPT

## 2022-01-06 NOTE — HISTORY OF PRESENT ILLNESS
[FreeTextEntry1] : HPI: Mr. Storey is a 76 year old male with hx of BPH, nephrolithiasis presented to the ED with AMS, \par fevers and right flank pain. Patient found to have 7mm right distal ureteral  stone on CT scan. Patient emergently taken to the OR 9/5 for cystoscopy, right ureteral stent placement. Urine and blood cultures grew E.Coli on Zosyn IV. Repeat blood cultures no growth to date. Also found to have a 2-3 cm bladder stone. No nausea/vomiting, but has had fever at home to 102 since Saturday but did not want to go to the emergency room. \par \par PVR today 35 cc. Nocturia previously 3-4x. Had prevoiusly seen Dr. Pringle with PSA to 10.8, mpMRI PIRADS1 lesion noted, 145g gland. \par \par Anticoagulation: Previously on ASA\par All: None\par Social: lives at home, never smoker, never ETOH\par PMHx: BPH, nephrolithiasis\par FHx: gastric cancer\par PSHx: No prior surgeries\par Labs: Cr 0.9 9/6, UCx with E. coli, PIRADS1 lesion \par Imaging: CT in PACS with 7 mm ureteral stone, 2 cm bladder stone\par \par 11/4:\par Here after admissoin to hospital. He was found to have R epididymoorchitis. WBC up to 16. He was found to have cardiology inferior infarct, seeing cardiology tomorrow. Finished cipro. Echo in hospital WNL, but needed stress test for clearance. \par \par 11/22:\par Epididymoorchitis resolved, s/p cystolitholapaxy and R URS. Here today to discuss prostate management. No issues at home, right flank significantly improved. Discussed stone composition 100% COM. \par \par 1/6:\par Here today s/p aquaablation. Filled to 180 cc, voided 120, PVR 60cc. Pathology pending. \par  [Urinary Incontinence] : no urinary incontinence [Urinary Retention] : no urinary retention [Urinary Urgency] : no urinary urgency [Urinary Frequency] : no urinary frequency

## 2022-01-06 NOTE — PHYSICAL EXAM
[General Appearance - Well Developed] : well developed [General Appearance - Well Nourished] : well nourished [Bowel Sounds] : normal bowel sounds [Abdomen Soft] : soft [FreeTextEntry1] : catheter in place, yellow urine [Skin Color & Pigmentation] : normal skin color and pigmentation [Skin Turgor] : supple [Heart Rate And Rhythm] : Heart rate and rhythm were normal [] : no respiratory distress [Respiration, Rhythm And Depth] : normal respiratory rhythm and effort [Oriented To Time, Place, And Person] : oriented to person, place, and time [Not Anxious] : not anxious [Normal Station and Gait] : the gait and station were normal for the patient's age [No Focal Deficits] : no focal deficits

## 2022-01-18 ENCOUNTER — APPOINTMENT (OUTPATIENT)
Dept: UROLOGY | Facility: CLINIC | Age: 77
End: 2022-01-18
Payer: MEDICARE

## 2022-01-18 VITALS — SYSTOLIC BLOOD PRESSURE: 142 MMHG | DIASTOLIC BLOOD PRESSURE: 85 MMHG | HEART RATE: 91 BPM

## 2022-01-18 DIAGNOSIS — N21.0 CALCULUS IN BLADDER: ICD-10-CM

## 2022-01-18 PROCEDURE — 99213 OFFICE O/P EST LOW 20 MIN: CPT

## 2022-01-18 PROCEDURE — 51798 US URINE CAPACITY MEASURE: CPT

## 2022-02-08 PROCEDURE — 85027 COMPLETE CBC AUTOMATED: CPT

## 2022-02-08 PROCEDURE — 83735 ASSAY OF MAGNESIUM: CPT

## 2022-02-08 PROCEDURE — C9399: CPT

## 2022-02-08 PROCEDURE — 36415 COLL VENOUS BLD VENIPUNCTURE: CPT

## 2022-02-08 PROCEDURE — 80048 BASIC METABOLIC PNL TOTAL CA: CPT

## 2022-02-08 PROCEDURE — U0005: CPT

## 2022-02-08 PROCEDURE — 84100 ASSAY OF PHOSPHORUS: CPT

## 2022-02-08 PROCEDURE — U0003: CPT

## 2022-02-08 PROCEDURE — 88305 TISSUE EXAM BY PATHOLOGIST: CPT

## 2022-02-08 PROCEDURE — S2900: CPT

## 2022-02-28 NOTE — HISTORY OF PRESENT ILLNESS
[FreeTextEntry1] : HPI: Mr. Storey is a 76 year old male with hx of BPH, nephrolithiasis presented to the ED with AMS, \par fevers and right flank pain. Patient found to have 7mm right distal ureteral  stone on CT scan. Patient emergently taken to the OR 9/5 for cystoscopy, right ureteral stent placement. Urine and blood cultures grew E.Coli on Zosyn IV. Repeat blood cultures no growth to date. Also found to have a 2-3 cm bladder stone. No nausea/vomiting, but has had fever at home to 102 since Saturday but did not want to go to the emergency room. \par \par PVR today 35 cc. Nocturia previously 3-4x. Had prevoiusly seen Dr. Pringle with PSA to 10.8, mpMRI PIRADS1 lesion noted, 145g gland. \par \par Anticoagulation: Previously on ASA\par All: None\par Social: lives at home, never smoker, never ETOH\par PMHx: BPH, nephrolithiasis\par FHx: gastric cancer\par PSHx: No prior surgeries\par Labs: Cr 0.9 9/6, UCx with E. coli, PIRADS1 lesion \par Imaging: CT in PACS with 7 mm ureteral stone, 2 cm bladder stone\par \par 11/4:\par Here after admissoin to hospital. He was found to have R epididymoorchitis. WBC up to 16. He was found to have cardiology inferior infarct, seeing cardiology tomorrow. Finished cipro. Echo in hospital WNL, but needed stress test for clearance. \par \par 11/22:\par Epididymoorchitis resolved, s/p cystolitholapaxy and R URS. Here today to discuss prostate management. No issues at home, right flank significantly improved. Discussed stone composition 100% COM. \par \par 1/6:\par Here today s/p aquaablation. Filled to 180 cc, voided 120, PVR 60cc. Pathology pending. \par \par 1/18:\par Here today for f/u s/p aquaablation. Flow/PVR today with low volume, PVR 13 cc. VV 46 cc. Having some frequency and intermittency, occasionally having to wear pads. Does endorse much stronger stream. Has not had stone prevention discussion yet. Diet modification reviewed at length- increasing fluids (primarily water), citrus is good, and decreasing/moderating salt, animal flesh protein, oxalate containing foods, and moderation of calcium intake (1000 mg/day is USRDA).\par \par I reviewed with the patient the risks of metabolic stone disease given their underlying risk parameters (all of which include large stones, multiple stones, bilateral stones, family history, and young age), and the indications for 24 hour urine metabolic assessment.\par \par We also discussed benefits of regular exercise and weight loss as independent risk reducers for stones.\par \par \par  [Urinary Incontinence] : no urinary incontinence [Urinary Retention] : no urinary retention [Urinary Urgency] : no urinary urgency [Urinary Frequency] : no urinary frequency

## 2022-03-01 ENCOUNTER — APPOINTMENT (OUTPATIENT)
Dept: UROLOGY | Facility: CLINIC | Age: 77
End: 2022-03-01
Payer: MEDICARE

## 2022-03-01 ENCOUNTER — OUTPATIENT (OUTPATIENT)
Dept: OUTPATIENT SERVICES | Facility: HOSPITAL | Age: 77
LOS: 1 days | End: 2022-03-01
Payer: COMMERCIAL

## 2022-03-01 DIAGNOSIS — Z98.49 CATARACT EXTRACTION STATUS, UNSPECIFIED EYE: Chronic | ICD-10-CM

## 2022-03-01 DIAGNOSIS — Z98.890 OTHER SPECIFIED POSTPROCEDURAL STATES: Chronic | ICD-10-CM

## 2022-03-01 DIAGNOSIS — Z96.0 PRESENCE OF UROGENITAL IMPLANTS: Chronic | ICD-10-CM

## 2022-03-01 DIAGNOSIS — R35.0 FREQUENCY OF MICTURITION: ICD-10-CM

## 2022-03-01 DIAGNOSIS — N40.1 BENIGN PROSTATIC HYPERPLASIA WITH LOWER URINARY TRACT SYMPTOMS: ICD-10-CM

## 2022-03-01 DIAGNOSIS — N20.1 CALCULUS OF URETER: ICD-10-CM

## 2022-03-01 PROCEDURE — 51798 US URINE CAPACITY MEASURE: CPT

## 2022-03-01 PROCEDURE — 76775 US EXAM ABDO BACK WALL LIM: CPT

## 2022-03-01 PROCEDURE — 99213 OFFICE O/P EST LOW 20 MIN: CPT

## 2022-03-01 RX ORDER — MIRABEGRON 25 MG/1
25 TABLET, FILM COATED, EXTENDED RELEASE ORAL
Qty: 30 | Refills: 6 | Status: ACTIVE | COMMUNITY
Start: 2022-03-01 | End: 1900-01-01

## 2022-03-01 NOTE — HISTORY OF PRESENT ILLNESS
[FreeTextEntry1] : HPI: Mr. Storey is a 76 year old male with hx of BPH, nephrolithiasis presented to the ED with AMS, \par fevers and right flank pain. Patient found to have 7mm right distal ureteral  stone on CT scan. Patient emergently taken to the OR 9/5 for cystoscopy, right ureteral stent placement. Urine and blood cultures grew E.Coli on Zosyn IV. Repeat blood cultures no growth to date. Also found to have a 2-3 cm bladder stone. No nausea/vomiting, but has had fever at home to 102 since Saturday but did not want to go to the emergency room. \par \par PVR today 35 cc. Nocturia previously 3-4x. Had prevoiusly seen Dr. Pringle with PSA to 10.8, mpMRI PIRADS1 lesion noted, 145g gland. \par \par Anticoagulation: Previously on ASA\par All: None\par Social: lives at home, never smoker, never ETOH\par PMHx: BPH, nephrolithiasis\par FHx: gastric cancer\par PSHx: No prior surgeries\par Labs: Cr 0.9 9/6, UCx with E. coli, PIRADS1 lesion \par Imaging: CT in PACS with 7 mm ureteral stone, 2 cm bladder stone\par \par 11/4:\par Here after admissoin to hospital. He was found to have R epididymoorchitis. WBC up to 16. He was found to have cardiology inferior infarct, seeing cardiology tomorrow. Finished cipro. Echo in hospital WNL, but needed stress test for clearance. \par \par 11/22:\par Epididymoorchitis resolved, s/p cystolitholapaxy and R URS. Here today to discuss prostate management. No issues at home, right flank significantly improved. Discussed stone composition 100% COM. \par \par 1/6:\par Here today s/p aquaablation. Filled to 180 cc, voided 120, PVR 60cc. Pathology pending. \par \par 1/18:\par Here today for f/u s/p aquaablation. Flow/PVR today with low volume, PVR 13 cc. VV 46 cc. Having some frequency and intermittency, occasionally having to wear pads. Does endorse much stronger stream. Has not had stone prevention discussion yet. Diet modification reviewed at length- increasing fluids (primarily water), citrus is good, and decreasing/moderating salt, animal flesh protein, oxalate containing foods, and moderation of calcium intake (1000 mg/day is USRDA).\par \par I reviewed with the patient the risks of metabolic stone disease given their underlying risk parameters (all of which include large stones, multiple stones, bilateral stones, family history, and young age), and the indications for 24 hour urine metabolic assessment.\par \par We also discussed benefits of regular exercise and weight loss as independent risk reducers for stones.\par \par 3/1:\par Here today s/p aquaablation and R URS. Has known L kidney stone on prior CT. US renal with 8 mm R stone. LL reviewed, low volume, hypocitrate <400 and elevated Grady. Recs discussed included increased water intake, lithotlyte 1 packet BID And salt reduction. Also complaining of urinary frequency. PVR today 26 cc.\par \par  [Urinary Incontinence] : no urinary incontinence [Urinary Retention] : no urinary retention [Urinary Urgency] : no urinary urgency [Urinary Frequency] : no urinary frequency

## 2022-03-02 ENCOUNTER — TRANSCRIPTION ENCOUNTER (OUTPATIENT)
Age: 77
End: 2022-03-02

## 2022-05-15 NOTE — BRIEF OPERATIVE NOTE - OPERATION/FINDINGS
Vent Day # 12  Vent settings reviewed and adjusted to optimize gas exchange  VAP prophylaxis  ABG daily and prn to assess response to therapy  Off sedation and tolerating SBT but still very weak unable to lift head off pillow.  FVC 1.5 L w/ cough and best NIF -21 cm H2O.  Concern with ability to protect airway with extubation given N/V.   Right ureteral stent placement   Urine clear

## 2022-07-05 ENCOUNTER — OUTPATIENT (OUTPATIENT)
Dept: OUTPATIENT SERVICES | Facility: HOSPITAL | Age: 77
LOS: 1 days | End: 2022-07-05
Payer: COMMERCIAL

## 2022-07-05 ENCOUNTER — APPOINTMENT (OUTPATIENT)
Dept: UROLOGY | Facility: CLINIC | Age: 77
End: 2022-07-05

## 2022-07-05 DIAGNOSIS — R35.0 FREQUENCY OF MICTURITION: ICD-10-CM

## 2022-07-05 DIAGNOSIS — Z96.0 PRESENCE OF UROGENITAL IMPLANTS: Chronic | ICD-10-CM

## 2022-07-05 DIAGNOSIS — N20.1 CALCULUS OF URETER: ICD-10-CM

## 2022-07-05 DIAGNOSIS — Z98.890 OTHER SPECIFIED POSTPROCEDURAL STATES: Chronic | ICD-10-CM

## 2022-07-05 DIAGNOSIS — Z98.49 CATARACT EXTRACTION STATUS, UNSPECIFIED EYE: Chronic | ICD-10-CM

## 2022-07-05 PROCEDURE — 76775 US EXAM ABDO BACK WALL LIM: CPT | Mod: 26

## 2022-07-05 PROCEDURE — 99213 OFFICE O/P EST LOW 20 MIN: CPT

## 2022-07-05 PROCEDURE — 76775 US EXAM ABDO BACK WALL LIM: CPT

## 2022-07-20 DIAGNOSIS — N40.1 BENIGN PROSTATIC HYPERPLASIA WITH LOWER URINARY TRACT SYMPTOMS: ICD-10-CM

## 2022-07-20 DIAGNOSIS — N20.1 CALCULUS OF URETER: ICD-10-CM

## 2022-07-20 LAB — PSA SERPL-MCNC: 8.59 NG/ML

## 2022-08-02 ENCOUNTER — RESULT REVIEW (OUTPATIENT)
Age: 77
End: 2022-08-02

## 2022-08-02 ENCOUNTER — APPOINTMENT (OUTPATIENT)
Dept: MRI IMAGING | Facility: IMAGING CENTER | Age: 77
End: 2022-08-02

## 2022-08-02 ENCOUNTER — OUTPATIENT (OUTPATIENT)
Dept: OUTPATIENT SERVICES | Facility: HOSPITAL | Age: 77
LOS: 1 days | End: 2022-08-02
Payer: COMMERCIAL

## 2022-08-02 DIAGNOSIS — Z98.49 CATARACT EXTRACTION STATUS, UNSPECIFIED EYE: Chronic | ICD-10-CM

## 2022-08-02 DIAGNOSIS — Z98.890 OTHER SPECIFIED POSTPROCEDURAL STATES: Chronic | ICD-10-CM

## 2022-08-02 DIAGNOSIS — R97.20 ELEVATED PROSTATE SPECIFIC ANTIGEN [PSA]: ICD-10-CM

## 2022-08-02 DIAGNOSIS — Z00.8 ENCOUNTER FOR OTHER GENERAL EXAMINATION: ICD-10-CM

## 2022-08-02 DIAGNOSIS — Z96.0 PRESENCE OF UROGENITAL IMPLANTS: Chronic | ICD-10-CM

## 2022-08-02 PROCEDURE — 72197 MRI PELVIS W/O & W/DYE: CPT | Mod: 26

## 2022-08-02 PROCEDURE — 72197 MRI PELVIS W/O & W/DYE: CPT

## 2022-08-02 PROCEDURE — 76498P: CUSTOM | Mod: 26

## 2022-08-02 PROCEDURE — A9585: CPT

## 2022-08-02 PROCEDURE — 76498 UNLISTED MR PROCEDURE: CPT

## 2022-08-12 NOTE — HISTORY OF PRESENT ILLNESS
[FreeTextEntry1] : HPI: Mr. Storey is a 76 year old male with hx of BPH, nephrolithiasis presented to the ED with AMS, \par fevers and right flank pain. Patient found to have 7mm right distal ureteral  stone on CT scan. Patient emergently taken to the OR 9/5 for cystoscopy, right ureteral stent placement. Urine and blood cultures grew E.Coli on Zosyn IV. Repeat blood cultures no growth to date. Also found to have a 2-3 cm bladder stone. No nausea/vomiting, but has had fever at home to 102 since Saturday but did not want to go to the emergency room. \par \par PVR today 35 cc. Nocturia previously 3-4x. Had prevoiusly seen Dr. Pringle with PSA to 10.8, mpMRI PIRADS1 lesion noted, 145g gland. \par \par Anticoagulation: Previously on ASA\par All: None\par Social: lives at home, never smoker, never ETOH\par PMHx: BPH, nephrolithiasis\par FHx: gastric cancer\par PSHx: No prior surgeries\par Labs: Cr 0.9 9/6, UCx with E. coli, PIRADS1 lesion \par Imaging: CT in PACS with 7 mm ureteral stone, 2 cm bladder stone\par \par 11/4:\par Here after admissoin to hospital. He was found to have R epididymoorchitis. WBC up to 16. He was found to have cardiology inferior infarct, seeing cardiology tomorrow. Finished cipro. Echo in hospital WNL, but needed stress test for clearance. \par \par 3/1:\par Here today s/p aquaablation and R URS. Has known L kidney stone on prior CT. US renal with 8 mm R stone. LL reviewed, low volume, hypocitrate <400 and elevated Grady. Recs discussed included increased water intake, lithotlyte 1 packet BID And salt reduction. Also complaining of urinary frequency. PVR today 26 cc.\par \par 7/5:\par Here for follow up for BPH (s/p aquaablation and R URS). 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 LL reviewed with low volume still, citraturia improved to >600. Grady still elevated with UCa elevated; discussed increasing water intake >2.5L and decreasing salt <2g a day. Occasionally has some urgency, overall very happy with voiding symptoms. \par \par  [Urinary Incontinence] : no urinary incontinence [Urinary Retention] : no urinary retention [Urinary Urgency] : no urinary urgency [Urinary Frequency] : no urinary frequency

## 2022-10-11 ENCOUNTER — OUTPATIENT (OUTPATIENT)
Dept: OUTPATIENT SERVICES | Facility: HOSPITAL | Age: 77
LOS: 1 days | End: 2022-10-11
Payer: COMMERCIAL

## 2022-10-11 ENCOUNTER — APPOINTMENT (OUTPATIENT)
Dept: UROLOGY | Facility: CLINIC | Age: 77
End: 2022-10-11

## 2022-10-11 DIAGNOSIS — Z96.0 PRESENCE OF UROGENITAL IMPLANTS: Chronic | ICD-10-CM

## 2022-10-11 DIAGNOSIS — R35.0 FREQUENCY OF MICTURITION: ICD-10-CM

## 2022-10-11 DIAGNOSIS — Z87.442 PERSONAL HISTORY OF URINARY CALCULI: ICD-10-CM

## 2022-10-11 DIAGNOSIS — Z98.49 CATARACT EXTRACTION STATUS, UNSPECIFIED EYE: Chronic | ICD-10-CM

## 2022-10-11 DIAGNOSIS — D17.9 BENIGN LIPOMATOUS NEOPLASM, UNSPECIFIED: ICD-10-CM

## 2022-10-11 DIAGNOSIS — Z98.890 OTHER SPECIFIED POSTPROCEDURAL STATES: Chronic | ICD-10-CM

## 2022-10-11 PROCEDURE — 76775 US EXAM ABDO BACK WALL LIM: CPT | Mod: 26

## 2022-10-11 PROCEDURE — 76775 US EXAM ABDO BACK WALL LIM: CPT

## 2022-10-11 PROCEDURE — 99213 OFFICE O/P EST LOW 20 MIN: CPT

## 2022-10-11 PROCEDURE — 51798 US URINE CAPACITY MEASURE: CPT

## 2022-10-11 NOTE — HISTORY OF PRESENT ILLNESS
[FreeTextEntry1] : HPI: Mr. Storey is a 76 year old male with hx of BPH, nephrolithiasis presented to the ED with AMS, \par fevers and right flank pain. Patient found to have 7mm right distal ureteral  stone on CT scan. Patient emergently taken to the OR 9/5 for cystoscopy, right ureteral stent placement. Urine and blood cultures grew E.Coli on Zosyn IV. Repeat blood cultures no growth to date. Also found to have a 2-3 cm bladder stone. No nausea/vomiting, but has had fever at home to 102 since Saturday but did not want to go to the emergency room. \par \par PVR today 35 cc. Nocturia previously 3-4x. Had prevoiusly seen Dr. Pringle with PSA to 10.8, mpMRI PIRADS1 lesion noted, 145g gland. \par \par Anticoagulation: Previously on ASA\par All: None\par Social: lives at home, never smoker, never ETOH\par PMHx: BPH, nephrolithiasis\par FHx: gastric cancer\par PSHx: No prior surgeries\par Labs: Cr 0.9 9/6, UCx with E. coli, PIRADS1 lesion \par Imaging: CT in PACS with 7 mm ureteral stone, 2 cm bladder stone\par \par 11/4:\par Here after admissoin to hospital. He was found to have R epididymoorchitis. WBC up to 16. He was found to have cardiology inferior infarct, seeing cardiology tomorrow. Finished cipro. Echo in hospital WNL, but needed stress test for clearance. \par \par 3/1:\par Here today s/p aquaablation and R URS. Has known L kidney stone on prior CT. US renal with 8 mm R stone. LL reviewed, low volume, hypocitrate <400 and elevated Grady. Recs discussed included increased water intake, lithotlyte 1 packet BID And salt reduction. Also complaining of urinary frequency. PVR today 26 cc.\par \par 7/5:\par Here for follow up for BPH (s/p aquaablation and R URS). 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 LL reviewed with low volume still, citraturia improved to >600. Grady still elevated with UCa elevated; discussed increasing water intake >2.5L and decreasing salt <2g a day. Occasionally has some urgency, overall very happy with voiding symptoms. \par \par 10/11:\par Here today after URS and BPH. ULS today no stones. Prevoid volume 200 cc, and PVR 73 cc. 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 LL with significantly improved volume, UCitrate >600, Grady much improved and has decreased salt intake. Urgency minimal, would like to try cialis for his erections. \par  [Urinary Incontinence] : no urinary incontinence [Urinary Retention] : no urinary retention [Urinary Urgency] : no urinary urgency [Urinary Frequency] : no urinary frequency

## 2022-10-11 NOTE — ASSESSMENT
[FreeTextEntry1] : 78 yo M with history of BPH s/p AA and URS for stones, stone free and voiding well

## 2022-10-13 DIAGNOSIS — Z87.442 PERSONAL HISTORY OF URINARY CALCULI: ICD-10-CM

## 2022-10-13 DIAGNOSIS — N40.1 BENIGN PROSTATIC HYPERPLASIA WITH LOWER URINARY TRACT SYMPTOMS: ICD-10-CM

## 2022-10-13 DIAGNOSIS — N52.9 MALE ERECTILE DYSFUNCTION, UNSPECIFIED: ICD-10-CM

## 2022-10-13 DIAGNOSIS — D17.9 BENIGN LIPOMATOUS NEOPLASM, UNSPECIFIED: ICD-10-CM

## 2022-11-23 ENCOUNTER — NON-APPOINTMENT (OUTPATIENT)
Age: 77
End: 2022-11-23

## 2023-02-24 ENCOUNTER — APPOINTMENT (OUTPATIENT)
Dept: OPHTHALMOLOGY | Facility: CLINIC | Age: 78
End: 2023-02-24

## 2023-03-27 NOTE — ED PROVIDER NOTE - WET READ LAUNCH FT
PROGRESS NOTE    Data:  Frances Hartman is a 37 y.o. female who met with the undersigned for a scheduled individual therapy session from 9:45 - 10:30am.      Clinical Maneuvering/Intervention:      The pt talked about recent stressors including medical issues such as pain, bleeding (menstrual), and frustrations/anger by not feeling heard by doctors. Stressors were processed individually and in detail. Venting of frustrations was conducted in order to help the pt feel less tense emotionally and gain insight into issues. Feelings were processed and validated several times in session. Perspective taking was conducted multiple times in order to help the pt feel less stuck, less overwhelmed, and see challenges as much more manageable. Active listening was conducted in order to help the pt make sense of stressors and start moving towards potential solutions. The pt was assisted with finding solutions based on existing skill-set and abilities. The pt gave several examples of difficult, frustrating, and/or hurtful interactions with medical personal. Underlying hurt and fear were addressed. She was able to connect her past of never feeling heard, to fears of not being heard, and then becoming more adamant about being heard now. Manners, behavior, and tone that might help her interact with others were touched on. Finding her own role in the tension was conducted throughout the session. This resulted in the pt being willing to be open with her new provider about her concerns/fears, and still be clear about what she is hoping to get out of the visit. An action plan was designed to empower the pt to know what to do. Simple steps of one, two, three were laid out in order to help the pt feel more in control of things and feel less stressed. Prayer for being heard and getting needs met were discussed. Envisioning her success with the visit was practiced, via femi-based therapy. The pt's strengths were identified in order to  help identify abilities to use to better face/overcome challenges. Homework was assigned tailored to pt's needs. The pt expressed gratitude for today's session.    Mental Status Exam  Hygiene:  good  Dress: normal  Attitude:  cooperative and proactive  Motor Activity: normal  Speech: normal  Mood:  Irritable and frustrated   Affect:  congruent  Thought Processes: normal  Thought Content:  normal  Suicidal Thoughts:  not endorsed  Homicidal Thoughts:  not endorsed  Crisis Safety Plan: not needed   Hallucinations:  none      Patient's Support Network Includes:  family, friends      Progress toward goal: there is evidence to suggest that she is becoming a stronger person over time.      Functional Status: moderate to high      Prognosis: good    Assessment      The pt presents to be struggling with depression, often manifested as irritability and/or anger. She worries/fears that she will continue not feeling heard despite trying to get medical needs met.  She worries about her mother's health as of late, which leads to emotional distress as well. She is intelligent, thoughtful, and witty person.       Plan      In order to diminish depression, anger, and irritability; she will make a point to be calm, clear, and transparent with her doctor tomorrow in order to have needs met. She will practice this sort of communication in medical visits in the future, so that she can do her part to more effectively have medical needs met (ongoing).    Misty Nava, PhD, LP      There are no Wet Read(s) to document. There is 1 Wet Read(s) to document.

## 2023-04-11 ENCOUNTER — APPOINTMENT (OUTPATIENT)
Dept: UROLOGY | Facility: CLINIC | Age: 78
End: 2023-04-11
Payer: MEDICARE

## 2023-04-11 ENCOUNTER — OUTPATIENT (OUTPATIENT)
Dept: OUTPATIENT SERVICES | Facility: HOSPITAL | Age: 78
LOS: 1 days | End: 2023-04-11
Payer: COMMERCIAL

## 2023-04-11 DIAGNOSIS — Z98.49 CATARACT EXTRACTION STATUS, UNSPECIFIED EYE: Chronic | ICD-10-CM

## 2023-04-11 DIAGNOSIS — N20.0 CALCULUS OF KIDNEY: ICD-10-CM

## 2023-04-11 DIAGNOSIS — R35.0 FREQUENCY OF MICTURITION: ICD-10-CM

## 2023-04-11 DIAGNOSIS — Z96.0 PRESENCE OF UROGENITAL IMPLANTS: Chronic | ICD-10-CM

## 2023-04-11 DIAGNOSIS — Z98.890 OTHER SPECIFIED POSTPROCEDURAL STATES: Chronic | ICD-10-CM

## 2023-04-11 PROCEDURE — 99214 OFFICE O/P EST MOD 30 MIN: CPT

## 2023-04-11 PROCEDURE — 76775 US EXAM ABDO BACK WALL LIM: CPT

## 2023-04-11 PROCEDURE — 76775 US EXAM ABDO BACK WALL LIM: CPT | Mod: 26

## 2023-04-11 NOTE — HISTORY OF PRESENT ILLNESS
[FreeTextEntry1] : HPI: Mr. Storey is a 76 year old male with hx of BPH, nephrolithiasis presented to the ED with AMS, \par fevers and right flank pain. Patient found to have 7mm right distal ureteral  stone on CT scan. Patient emergently taken to the OR 9/5 for cystoscopy, right ureteral stent placement. Urine and blood cultures grew E.Coli on Zosyn IV. Repeat blood cultures no growth to date. Also found to have a 2-3 cm bladder stone. No nausea/vomiting, but has had fever at home to 102 since Saturday but did not want to go to the emergency room. \par \par PVR today 35 cc. Nocturia previously 3-4x. Had prevoiusly seen Dr. Pringle with PSA to 10.8, mpMRI PIRADS1 lesion noted, 145g gland. \par \par Anticoagulation: Previously on ASA\par All: None\par Social: lives at home, never smoker, never ETOH\par PMHx: BPH, nephrolithiasis\par FHx: gastric cancer\par PSHx: No prior surgeries\par Labs: Cr 0.9 9/6, UCx with E. coli, PIRADS1 lesion \par Imaging: CT in PACS with 7 mm ureteral stone, 2 cm bladder stone\par \par 11/4:\par Here after admissoin to hospital. He was found to have R epididymoorchitis. WBC up to 16. He was found to have cardiology inferior infarct, seeing cardiology tomorrow. Finished cipro. Echo in hospital WNL, but needed stress test for clearance. \par \par 3/1:\par Here today s/p aquaablation and R URS. Has known L kidney stone on prior CT. US renal with 8 mm R stone. LL reviewed, low volume, hypocitrate <400 and elevated Grady. Recs discussed included increased water intake, lithotlyte 1 packet BID And salt reduction. Also complaining of urinary frequency. PVR today 26 cc.\par \par 7/5:\par Here for follow up for BPH (s/p aquaablation and R URS). 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 LL reviewed with low volume still, citraturia improved to >600. Grady still elevated with UCa elevated; discussed increasing water intake >2.5L and decreasing salt <2g a day. Occasionally has some urgency, overall very happy with voiding symptoms. \par \par 10/11:\par Here today after URS and BPH. ULS today no stones. Prevoid volume 200 cc, and PVR 73 cc. 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 LL with significantly improved volume, UCitrate >600, Grady much improved and has decreased salt intake. Urgency minimal, would like to try cialis for his erections. \par \par 4/11:\par HEre today after ureteroscopy and ULS today without stones.  cc today. Stable ?AML seen. LL with good volume, U citrate 300s but SS indices all wnl. Discussed he needs to continue citrate supplementation with 2 citrus fruits a day and low salt diet. PVR rising. Discussing starting tamsulosin and AE discussed with patient. \par  [Urinary Incontinence] : no urinary incontinence [Urinary Retention] : no urinary retention [Urinary Urgency] : no urinary urgency [Urinary Frequency] : no urinary frequency

## 2023-04-12 DIAGNOSIS — N40.1 BENIGN PROSTATIC HYPERPLASIA WITH LOWER URINARY TRACT SYMPTOMS: ICD-10-CM

## 2023-04-12 DIAGNOSIS — N20.0 CALCULUS OF KIDNEY: ICD-10-CM

## 2023-06-13 ENCOUNTER — APPOINTMENT (OUTPATIENT)
Dept: ORTHOPEDIC SURGERY | Facility: CLINIC | Age: 78
End: 2023-06-13
Payer: MEDICARE

## 2023-06-13 VITALS — HEIGHT: 66 IN | BODY MASS INDEX: 29.73 KG/M2 | WEIGHT: 185 LBS

## 2023-06-13 DIAGNOSIS — Z00.00 ENCOUNTER FOR GENERAL ADULT MEDICAL EXAMINATION W/OUT ABNORMAL FINDINGS: ICD-10-CM

## 2023-06-13 DIAGNOSIS — M17.11 UNILATERAL PRIMARY OSTEOARTHRITIS, RIGHT KNEE: ICD-10-CM

## 2023-06-13 PROCEDURE — 20610 DRAIN/INJ JOINT/BURSA W/O US: CPT | Mod: RT

## 2023-06-13 PROCEDURE — 99204 OFFICE O/P NEW MOD 45 MIN: CPT | Mod: 25

## 2023-06-13 PROCEDURE — 73564 X-RAY EXAM KNEE 4 OR MORE: CPT | Mod: RT

## 2023-06-13 NOTE — HISTORY OF PRESENT ILLNESS
[de-identified] : This is very nice 78-year-old gentleman experiencing chronic right knee pain, which is severe in intensity. The pain substantially limits activities of daily living. Walking tolerance is reduced.  Tylenol helps mildly.  He does not take NSAIDs.  He does not use a cane or walker.  He has not tried physical therapy.  The patient denies any radiation of the pain to the feet and it is not associated with numbness, tingling, or weakness.

## 2023-06-13 NOTE — DISCUSSION/SUMMARY
[de-identified] : This patient has right knee osteoarthritis.  The patient is not an appropriate candidate for surgical intervention at this time. An extensive discussion was conducted on the natural history of the disease and the variety of surgical and non-surgical options available to the patient including, but not limited to non-steroidal anti-inflammatory medications, steroid injections, physical therapy, maintenance of ideal body weight, and reduction of activity.  I recommended and prescribed a course of Mobic and physical therapy.  The patient is also encouraged to trial a neoprene sleeve knee brace which can be purchased OTC.  The patient is also encouraged to consider use of a cane.  Today we performed a right knee intra-articular cortisone injection. The patient will schedule an appointment as needed.\par \par Informed consent for the right knee injection was obtained. All questions were answered. A time out was performed. The right knee was prepped and draped in sterile fashion. Using sterile technique, the right knee was injected with 80mg of Kenalog, 4cc of 1% lidocaine, 4cc of 0.25% marcaine using a 21-gauge needle. A sterile dressing was applied. Post injection instructions were reviewed. The patient tolerated the procedure well.\par

## 2023-06-13 NOTE — PHYSICAL EXAM
[de-identified] : Patient is well nourished, well-developed, in no acute distress, with appropriate mood and affect. The patient is oriented to time, place, and person. Respirations are even and unlabored. Gait evaluation does reveal a limp. There is no inguinal adenopathy. Examination of the contralateral knee shows normal range of motion, strength, no tenderness, and intact skin. The affected limb is well-perfused, without skin lesions, shows a grossly normal motor and sensory examination. Knee motion is significantly reduced and does cause significant pain. The knee moves from  degrees. The knee is stable within that range-of-motion to AP and ML stress. The alignment of the knee is 5 degrees varus. Muscle strength is normal. Pedal pulses are palpable. Hip examination was negative.\par  [de-identified] : Long standing knee, AP knee, lateral knee, and patellar views of the right knee were ordered and taken in the office and demonstrate degenerative joint disease of the knee with joint space narrowing, osteophyte formation, and subchondral sclerosis.

## 2023-07-27 NOTE — PROGRESS NOTE ADULT - SUBJECTIVE AND OBJECTIVE BOX
afebrile    REVIEW OF SYSTEMS:  GEN: no fever,    no chills  RESP: no SOB,   no cough  CVS: no chest pain,   no palpitations  GI: no abdominal pain,   no nausea,   no vomiting,   no constipation,   no diarrhea  : no dysuria,   no frequency  NEURO: no headache,   no dizziness  PSYCH: no depression,   not anxious  Derm : no rash    MEDICATIONS  (STANDING):  enoxaparin Injectable 40 milliGRAM(s) SubCutaneous daily  ertapenem  IVPB 1000 milliGRAM(s) IV Intermittent every 24 hours  finasteride 5 milliGRAM(s) Oral daily  influenza   Vaccine 0.5 milliLiter(s) IntraMuscular once  lactated ringers. 1000 milliLiter(s) (80 mL/Hr) IV Continuous <Continuous>  tamsulosin 0.4 milliGRAM(s) Oral at bedtime    MEDICATIONS  (PRN):  acetaminophen   Tablet .. 650 milliGRAM(s) Oral every 6 hours PRN Temp greater or equal to 38C (100.4F), Mild Pain (1 - 3)  ondansetron Injectable 4 milliGRAM(s) IV Push every 8 hours PRN Nausea and/or Vomiting      Vital Signs Last 24 Hrs  T(C): 37.2 (16 Oct 2021 23:32), Max: 38.7 (16 Oct 2021 13:34)  T(F): 99 (16 Oct 2021 23:32), Max: 101.7 (16 Oct 2021 13:34)  HR: 87 (16 Oct 2021 23:32) (86 - 114)  BP: 146/85 (16 Oct 2021 23:32) (130/77 - 149/81)  BP(mean): 98 (16 Oct 2021 20:00) (98 - 98)  RR: 18 (16 Oct 2021 23:32) (18 - 20)  SpO2: 96% (16 Oct 2021 23:32) (95% - 98%)  CAPILLARY BLOOD GLUCOSE        I&O's Summary      PHYSICAL EXAM:  HEAD:  Atraumatic, Normocephalic  NECK: Supple, No   JVD  CHEST/LUNG:   no     rales,     no,    rhonchi  HEART: Regular rate and rhythm;         murmur  ABDOMEN: Soft, Nontender, ;   EXTREMITIES:  no      edema  NEUROLOGY:  alert    LABS:                        13.3   16.32 )-----------( 421      ( 16 Oct 2021 15:10 )             40.7     10-16    133<L>  |  100  |  9   ----------------------------<  121<H>  4.6   |  20<L>  |  0.67    Ca    9.1      16 Oct 2021 17:52    TPro  7.1  /  Alb  3.2<L>  /  TBili  1.2  /  DBili  x   /  AST  50<H>  /  ALT  65<H>  /  AlkPhos  173<H>  10-    PT/INR - ( 16 Oct 2021 15:11 )   PT: 13.2 sec;   INR: 1.11 ratio         PTT - ( 16 Oct 2021 15:11 )  PTT:30.3 sec  CARDIAC MARKERS ( 16 Oct 2021 17:52 )  x     / x     / 62 U/L / x     / x          Urinalysis Basic - ( 16 Oct 2021 15:12 )    Color: Yellow / Appearance: Slightly Turbid / S.012 / pH: x  Gluc: x / Ketone: Small  / Bili: Negative / Urobili: 2 mg/dL   Blood: x / Protein: 30 mg/dL / Nitrite: Negative   Leuk Esterase: Large / RBC: 43 /hpf /  /HPF   Sq Epi: x / Non Sq Epi: 0 /hpf / Bacteria: Moderate          10-16 @ 17:52  3.8  48              Consultant(s) Notes Reviewed:      Care Discussed with Consultants/Other Providers:     No

## 2023-09-02 ENCOUNTER — RX RENEWAL (OUTPATIENT)
Age: 78
End: 2023-09-02

## 2023-10-12 ENCOUNTER — APPOINTMENT (OUTPATIENT)
Dept: UROLOGY | Facility: CLINIC | Age: 78
End: 2023-10-12

## 2023-12-04 ENCOUNTER — RX RENEWAL (OUTPATIENT)
Age: 78
End: 2023-12-04

## 2023-12-15 NOTE — ED ADULT NURSE NOTE - FINAL NURSING ELECTRONIC SIGNATURE
Security and CPD at bedside   pt changed into a hospital gown.  Pt arrived barefoot in boxer shorts.    05-Sep-2021 22:01

## 2023-12-26 ENCOUNTER — RX RENEWAL (OUTPATIENT)
Age: 78
End: 2023-12-26

## 2024-03-07 ENCOUNTER — APPOINTMENT (OUTPATIENT)
Dept: UROLOGY | Facility: CLINIC | Age: 79
End: 2024-03-07

## 2024-03-26 NOTE — PATIENT PROFILE ADULT - NSPROPTRIGHTSUPPORTPHONE_GEN_A_NUR
[FreeTextEntry1] : 81 y/o woman with incidental 4mm supraclinoid right ICA aneurysm. Last imaging in 11/2022. She continues to smoke for which she was counseled on stopping. Exam non-focal with no current symptoms. As such, will continue conservative management with serial imaging for now  - Obtain interval MRA head to assess for aneurysm growth - Counseled on importance of BP control and smoking cessation - If no concerning changes on MRA head, will have patient RTC in 1 year [id number]

## 2024-04-19 ENCOUNTER — OUTPATIENT (OUTPATIENT)
Dept: OUTPATIENT SERVICES | Facility: HOSPITAL | Age: 79
LOS: 1 days | End: 2024-04-19
Payer: COMMERCIAL

## 2024-04-19 ENCOUNTER — APPOINTMENT (OUTPATIENT)
Dept: UROLOGY | Facility: CLINIC | Age: 79
End: 2024-04-19
Payer: MEDICARE

## 2024-04-19 DIAGNOSIS — N13.8 BENIGN PROSTATIC HYPERPLASIA WITH LOWER URINARY TRACT SYMPMS: ICD-10-CM

## 2024-04-19 DIAGNOSIS — Z98.49 CATARACT EXTRACTION STATUS, UNSPECIFIED EYE: Chronic | ICD-10-CM

## 2024-04-19 DIAGNOSIS — N52.9 MALE ERECTILE DYSFUNCTION, UNSPECIFIED: ICD-10-CM

## 2024-04-19 DIAGNOSIS — R35.0 FREQUENCY OF MICTURITION: ICD-10-CM

## 2024-04-19 DIAGNOSIS — R82.991 HYPOCITRATURIA: ICD-10-CM

## 2024-04-19 DIAGNOSIS — Z98.890 OTHER SPECIFIED POSTPROCEDURAL STATES: Chronic | ICD-10-CM

## 2024-04-19 DIAGNOSIS — N40.1 BENIGN PROSTATIC HYPERPLASIA WITH LOWER URINARY TRACT SYMPMS: ICD-10-CM

## 2024-04-19 DIAGNOSIS — Z96.0 PRESENCE OF UROGENITAL IMPLANTS: Chronic | ICD-10-CM

## 2024-04-19 PROCEDURE — 99214 OFFICE O/P EST MOD 30 MIN: CPT | Mod: 25

## 2024-04-19 PROCEDURE — 76775 US EXAM ABDO BACK WALL LIM: CPT | Mod: 26

## 2024-04-19 PROCEDURE — 76775 US EXAM ABDO BACK WALL LIM: CPT

## 2024-04-19 RX ORDER — TAMSULOSIN HYDROCHLORIDE 0.4 MG/1
0.4 CAPSULE ORAL
Qty: 30 | Refills: 11 | Status: ACTIVE | COMMUNITY
Start: 2023-04-11 | End: 1900-01-01

## 2024-04-19 RX ORDER — POTASSIUM CITRATE 15 MEQ/1
15 MEQ TABLET, EXTENDED RELEASE ORAL
Qty: 540 | Refills: 3 | Status: ACTIVE | COMMUNITY
Start: 2018-11-13 | End: 1900-01-01

## 2024-04-19 RX ORDER — TADALAFIL 20 MG/1
20 TABLET ORAL
Qty: 6 | Refills: 6 | Status: ACTIVE | COMMUNITY
Start: 2022-10-11 | End: 1900-01-01

## 2024-04-23 DIAGNOSIS — R82.991 HYPOCITRATURIA: ICD-10-CM

## 2024-04-23 DIAGNOSIS — N52.9 MALE ERECTILE DYSFUNCTION, UNSPECIFIED: ICD-10-CM

## 2024-04-23 DIAGNOSIS — N40.1 BENIGN PROSTATIC HYPERPLASIA WITH LOWER URINARY TRACT SYMPTOMS: ICD-10-CM

## 2024-05-06 NOTE — H&P ADULT - NSRESEARCHGRNTASSESS_GEN_ALL_CORE FT
Not applicable: This is a surgical and/or non-medical patient

## 2024-05-17 ENCOUNTER — EMERGENCY (EMERGENCY)
Facility: HOSPITAL | Age: 79
LOS: 1 days | Discharge: HOME | End: 2024-05-17
Attending: EMERGENCY MEDICINE
Payer: COMMERCIAL

## 2024-05-17 VITALS
HEART RATE: 79 BPM | RESPIRATION RATE: 16 BRPM | TEMPERATURE: 98 F | SYSTOLIC BLOOD PRESSURE: 147 MMHG | DIASTOLIC BLOOD PRESSURE: 86 MMHG | OXYGEN SATURATION: 98 %

## 2024-05-17 VITALS
HEIGHT: 66 IN | RESPIRATION RATE: 20 BRPM | DIASTOLIC BLOOD PRESSURE: 88 MMHG | SYSTOLIC BLOOD PRESSURE: 154 MMHG | HEART RATE: 89 BPM | OXYGEN SATURATION: 95 % | WEIGHT: 186.07 LBS | TEMPERATURE: 98 F

## 2024-05-17 DIAGNOSIS — Z96.0 PRESENCE OF UROGENITAL IMPLANTS: Chronic | ICD-10-CM

## 2024-05-17 DIAGNOSIS — Z98.49 CATARACT EXTRACTION STATUS, UNSPECIFIED EYE: Chronic | ICD-10-CM

## 2024-05-17 DIAGNOSIS — Z98.890 OTHER SPECIFIED POSTPROCEDURAL STATES: Chronic | ICD-10-CM

## 2024-05-17 LAB
ALBUMIN SERPL ELPH-MCNC: 4 G/DL — SIGNIFICANT CHANGE UP (ref 3.3–5)
ALP SERPL-CCNC: 79 U/L — SIGNIFICANT CHANGE UP (ref 40–120)
ALT FLD-CCNC: 33 U/L — SIGNIFICANT CHANGE UP (ref 10–45)
ANION GAP SERPL CALC-SCNC: 13 MMOL/L — SIGNIFICANT CHANGE UP (ref 5–17)
AST SERPL-CCNC: 33 U/L — SIGNIFICANT CHANGE UP (ref 10–40)
BASOPHILS # BLD AUTO: 0.06 K/UL — SIGNIFICANT CHANGE UP (ref 0–0.2)
BASOPHILS NFR BLD AUTO: 0.9 % — SIGNIFICANT CHANGE UP (ref 0–2)
BILIRUB SERPL-MCNC: 0.3 MG/DL — SIGNIFICANT CHANGE UP (ref 0.2–1.2)
BUN SERPL-MCNC: 13 MG/DL — SIGNIFICANT CHANGE UP (ref 7–23)
CALCIUM SERPL-MCNC: 9.3 MG/DL — SIGNIFICANT CHANGE UP (ref 8.4–10.5)
CHLORIDE SERPL-SCNC: 105 MMOL/L — SIGNIFICANT CHANGE UP (ref 96–108)
CO2 SERPL-SCNC: 20 MMOL/L — LOW (ref 22–31)
CREAT SERPL-MCNC: 0.85 MG/DL — SIGNIFICANT CHANGE UP (ref 0.5–1.3)
EGFR: 87 ML/MIN/1.73M2 — SIGNIFICANT CHANGE UP
EOSINOPHIL # BLD AUTO: 0.43 K/UL — SIGNIFICANT CHANGE UP (ref 0–0.5)
EOSINOPHIL NFR BLD AUTO: 6.5 % — HIGH (ref 0–6)
GLUCOSE SERPL-MCNC: 116 MG/DL — HIGH (ref 70–99)
HCT VFR BLD CALC: 43 % — SIGNIFICANT CHANGE UP (ref 39–50)
HGB BLD-MCNC: 14 G/DL — SIGNIFICANT CHANGE UP (ref 13–17)
IMM GRANULOCYTES NFR BLD AUTO: 0.3 % — SIGNIFICANT CHANGE UP (ref 0–0.9)
LYMPHOCYTES # BLD AUTO: 1.16 K/UL — SIGNIFICANT CHANGE UP (ref 1–3.3)
LYMPHOCYTES # BLD AUTO: 17.6 % — SIGNIFICANT CHANGE UP (ref 13–44)
MCHC RBC-ENTMCNC: 27.8 PG — SIGNIFICANT CHANGE UP (ref 27–34)
MCHC RBC-ENTMCNC: 32.6 GM/DL — SIGNIFICANT CHANGE UP (ref 32–36)
MCV RBC AUTO: 85.3 FL — SIGNIFICANT CHANGE UP (ref 80–100)
MONOCYTES # BLD AUTO: 0.7 K/UL — SIGNIFICANT CHANGE UP (ref 0–0.9)
MONOCYTES NFR BLD AUTO: 10.6 % — SIGNIFICANT CHANGE UP (ref 2–14)
NEUTROPHILS # BLD AUTO: 4.23 K/UL — SIGNIFICANT CHANGE UP (ref 1.8–7.4)
NEUTROPHILS NFR BLD AUTO: 64.1 % — SIGNIFICANT CHANGE UP (ref 43–77)
NRBC # BLD: 0 /100 WBCS — SIGNIFICANT CHANGE UP (ref 0–0)
PLATELET # BLD AUTO: 312 K/UL — SIGNIFICANT CHANGE UP (ref 150–400)
POTASSIUM SERPL-MCNC: 4.5 MMOL/L — SIGNIFICANT CHANGE UP (ref 3.5–5.3)
POTASSIUM SERPL-SCNC: 4.5 MMOL/L — SIGNIFICANT CHANGE UP (ref 3.5–5.3)
PROT SERPL-MCNC: 7.2 G/DL — SIGNIFICANT CHANGE UP (ref 6–8.3)
RBC # BLD: 5.04 M/UL — SIGNIFICANT CHANGE UP (ref 4.2–5.8)
RBC # FLD: 14.5 % — SIGNIFICANT CHANGE UP (ref 10.3–14.5)
SODIUM SERPL-SCNC: 138 MMOL/L — SIGNIFICANT CHANGE UP (ref 135–145)
WBC # BLD: 6.6 K/UL — SIGNIFICANT CHANGE UP (ref 3.8–10.5)
WBC # FLD AUTO: 6.6 K/UL — SIGNIFICANT CHANGE UP (ref 3.8–10.5)

## 2024-05-17 PROCEDURE — 85025 COMPLETE CBC W/AUTO DIFF WBC: CPT

## 2024-05-17 PROCEDURE — 99284 EMERGENCY DEPT VISIT MOD MDM: CPT

## 2024-05-17 PROCEDURE — 99283 EMERGENCY DEPT VISIT LOW MDM: CPT | Mod: 25

## 2024-05-17 PROCEDURE — 93005 ELECTROCARDIOGRAM TRACING: CPT

## 2024-05-17 PROCEDURE — 80053 COMPREHEN METABOLIC PANEL: CPT

## 2024-05-17 PROCEDURE — 36415 COLL VENOUS BLD VENIPUNCTURE: CPT

## 2024-05-17 RX ORDER — MECLIZINE HCL 12.5 MG
1 TABLET ORAL
Qty: 56 | Refills: 0
Start: 2024-05-17 | End: 2024-05-30

## 2024-05-17 RX ORDER — MECLIZINE HCL 12.5 MG
25 TABLET ORAL ONCE
Refills: 0 | Status: COMPLETED | OUTPATIENT
Start: 2024-05-17 | End: 2024-05-17

## 2024-05-17 RX ADMIN — Medication 25 MILLIGRAM(S): at 09:40

## 2024-05-17 NOTE — ED PROVIDER NOTE - PHYSICAL EXAMINATION
Gen: NAD, non-toxic appearing  Head: normal appearing  HEENT: normal conjunctiva, oral mucosa dry, normal TM, post sharri-hallpike   Lung: no respiratory distress, speaking in full sentences, CTA b/l     CV: regular rate and rhythm, no murmurs  Abd: soft, non distended, non tender   MSK: no visible deformities  Neuro: No focal deficits, AAOx3  Skin: Warm  Psych: normal affect

## 2024-05-17 NOTE — ED ADULT NURSE NOTE - OBJECTIVE STATEMENT
PT is an 79 year old A&OX4 male with PMH of BPH and right knee pain on Meloxicam who presents to the ED from home with c/o dizziness. PT states he flew home from [place] on Saturday and had severe calf pain B/L for 1 day. PT states over the last 3 days he has been having dizziness, fatigue, and nausea that is intermittent. PT denies vomiting, chest pain, SOB, diarrhea, fevers, chills, and urinary symptoms. PT is resting comfortably in bed, breathing unlabored on room air, and speaking in complete sentences. Abdomen is soft, non-tender, and non-distended. Skin is warm and dry, no diaphoresis noted. No edema noted to B/L extremities. Strong strength in B/L extremities, sensation intact. IV access established 20G in right AC. PT placed in hospital gown. PT placed on cardiac monitor. PT ambulatory with steady gait at baseline. Safety and comfort maintained. son at the bedside.

## 2024-05-17 NOTE — ED PROVIDER NOTE - PROGRESS NOTE DETAILS
Adair Callahan DO (PGY1)  VSS. Patient labs WNL with EKG revealing no ischemic changes or electrolyte abnormalities.  Patient feeling symptomatically s/p meclizine 25 mg p.o.  Advised patient follow-up PCP and neurologist for further evaluation of symptoms.  Prescribed 25 mg meclizine to pharmacy for symptomatic relief.  Medically cleared for discharge. Time was taken to answer all of patients questions and concerns. Return precaution instructions were given and patient understands and feels comfortable with disposition.

## 2024-05-17 NOTE — ED PROVIDER NOTE - OBJECTIVE STATEMENT
79-year-old male past medical history of BPH presents to ED for vertigo-like symptoms x 3 days.  Endorses symptoms are exacerbated positionally head movement.  Of note patient recently returned from [place] on Sunday.  Denies fever, chills, nausea, vomiting, chest pain, SOB, abdominal pain, urinary symptoms, headaches.  No recent sick contacts.  Not on AC.

## 2024-05-17 NOTE — ED PROVIDER NOTE - CLINICAL SUMMARY MEDICAL DECISION MAKING FREE TEXT BOX
Afebrile hemodynamic stable male presents to ED for vertiginous-like symptoms with positive [place]-Hallpike maneuver.  Nystagmus not noted.  EKG NSR with no ischemic changes.  Ordered basic labs to rule out infectious etiology versus electrolyte abnormality.  Given meclizine 25 mg, will reassess.  Likely dispo home with close PCP and neurology follow-up. [place]: Afebrile hemodynamic stable male presents to ED for vertiginous-like symptoms with positive [place]-Hallpike maneuver.  Nystagmus not noted.  EKG NSR with no ischemic changes.  Ordered basic labs to rule out infectious etiology versus electrolyte abnormality.  Given meclizine 25 mg, will reassess.  Likely dispo home with close PCP and neurology follow-up.    pt feeling better after trial of meclizine, stable for dc

## 2024-05-17 NOTE — ED PROVIDER NOTE - PATIENT PORTAL LINK FT
You can access the FollowMyHealth Patient Portal offered by [facility] by registering at the following website: [email or web address]. By joining [facility]’s FollowMyHealth portal, you will also be able to view your health information using other applications (apps) compatible with our system.

## 2024-05-17 NOTE — ED ADULT TRIAGE NOTE - CHIEF COMPLAINT QUOTE
dizziness, nausea, vomiting x 2 days. Denies chest pain. dizziness, nausea, vomiting x 2 days. Denies chest pain. Right knee pain.

## 2024-05-17 NOTE — ED PROVIDER NOTE - NSFOLLOWUPINSTRUCTIONS_ED_ALL_ED_FT
Please schedule follow-up appointment within the week with PCP and neurologist for further evaluation of symptoms.    Please take meclizine 25 mg orally as needed for symptomatic relief.  Use as prescribed on bottle.    Please take any prescribed medications as instructed by your PMD    - Be sure to return to the ED if you develop new or worsening symptoms. Specific signs and symptoms to be vigilant of: fever or chills, chest pain, difficulty breathing, palpitations, loss of consciousness, headache, vision changes, slurred speech, difficulty swallowing or drooling, facial droop, weakness in the arms or legs, numbness or tingling, abdominal pain, nausea or vomiting, diarrhea, constipation, blood in the stool or urine, pain on urination, difficulty urinating.

## 2024-05-17 NOTE — ED ADULT NURSE NOTE - NSFALLRISKINTERV_ED_ALL_ED

## 2024-06-07 ENCOUNTER — NON-APPOINTMENT (OUTPATIENT)
Age: 79
End: 2024-06-07

## 2024-06-15 LAB
APPEARANCE: CLEAR
BACTERIA UR CULT: NORMAL
BACTERIA: NEGATIVE /HPF
BILIRUBIN URINE: NEGATIVE
BLOOD URINE: ABNORMAL
CAST: 1 /LPF
COLOR: YELLOW
EPITHELIAL CELLS: 1 /HPF
GLUCOSE QUALITATIVE U: NEGATIVE MG/DL
KETONES URINE: NEGATIVE MG/DL
LEUKOCYTE ESTERASE URINE: NEGATIVE
MICROSCOPIC-UA: NORMAL
NITRITE URINE: NEGATIVE
PH URINE: 5.5
PROTEIN URINE: 30 MG/DL
RED BLOOD CELLS URINE: 38 /HPF
SPECIFIC GRAVITY URINE: 1.02
UROBILINOGEN URINE: 0.2 MG/DL
WHITE BLOOD CELLS URINE: 2 /HPF

## 2024-07-10 DIAGNOSIS — N02.9 RECURRENT AND PERSISTENT HEMATURIA WITH UNSPECIFIED MORPHOLOGIC CHANGES: ICD-10-CM

## 2024-07-16 ENCOUNTER — APPOINTMENT (OUTPATIENT)
Dept: UROLOGY | Facility: CLINIC | Age: 79
End: 2024-07-16
Payer: MEDICARE

## 2024-07-16 ENCOUNTER — OUTPATIENT (OUTPATIENT)
Dept: OUTPATIENT SERVICES | Facility: HOSPITAL | Age: 79
LOS: 1 days | End: 2024-07-16
Payer: COMMERCIAL

## 2024-07-16 ENCOUNTER — APPOINTMENT (OUTPATIENT)
Dept: CT IMAGING | Facility: IMAGING CENTER | Age: 79
End: 2024-07-16
Payer: MEDICARE

## 2024-07-16 VITALS
RESPIRATION RATE: 16 BRPM | HEART RATE: 101 BPM | SYSTOLIC BLOOD PRESSURE: 128 MMHG | DIASTOLIC BLOOD PRESSURE: 80 MMHG | HEIGHT: 66 IN | OXYGEN SATURATION: 97 % | BODY MASS INDEX: 29.73 KG/M2 | WEIGHT: 185 LBS

## 2024-07-16 DIAGNOSIS — N02.9 RECURRENT AND PERSISTENT HEMATURIA WITH UNSPECIFIED MORPHOLOGIC CHANGES: ICD-10-CM

## 2024-07-16 DIAGNOSIS — R35.0 FREQUENCY OF MICTURITION: ICD-10-CM

## 2024-07-16 DIAGNOSIS — Z98.890 OTHER SPECIFIED POSTPROCEDURAL STATES: Chronic | ICD-10-CM

## 2024-07-16 DIAGNOSIS — Z98.49 CATARACT EXTRACTION STATUS, UNSPECIFIED EYE: Chronic | ICD-10-CM

## 2024-07-16 DIAGNOSIS — Z96.0 PRESENCE OF UROGENITAL IMPLANTS: Chronic | ICD-10-CM

## 2024-07-16 DIAGNOSIS — R31.0 GROSS HEMATURIA: ICD-10-CM

## 2024-07-16 PROCEDURE — 52000 CYSTOURETHROSCOPY: CPT

## 2024-07-16 PROCEDURE — 74178 CT ABD&PLV WO CNTR FLWD CNTR: CPT

## 2024-07-16 PROCEDURE — 74178 CT ABD&PLV WO CNTR FLWD CNTR: CPT | Mod: 26

## 2024-07-16 RX ORDER — FINASTERIDE 5 MG/1
5 TABLET, FILM COATED ORAL DAILY
Qty: 90 | Refills: 3 | Status: ACTIVE | COMMUNITY
Start: 2024-07-16 | End: 1900-01-01

## 2024-07-21 PROBLEM — R31.0 GROSS HEMATURIA: Status: ACTIVE | Noted: 2024-07-16

## 2024-07-21 LAB — URINE CYTOLOGY: NORMAL

## 2024-07-22 DIAGNOSIS — R31.0 GROSS HEMATURIA: ICD-10-CM

## 2024-08-08 ENCOUNTER — APPOINTMENT (OUTPATIENT)
Dept: ORTHOPEDIC SURGERY | Facility: CLINIC | Age: 79
End: 2024-08-08

## 2024-08-08 PROCEDURE — 99215 OFFICE O/P EST HI 40 MIN: CPT | Mod: 25

## 2024-08-08 PROCEDURE — 73564 X-RAY EXAM KNEE 4 OR MORE: CPT | Mod: RT

## 2024-08-08 PROCEDURE — G2211 COMPLEX E/M VISIT ADD ON: CPT | Mod: NC

## 2024-08-08 NOTE — HISTORY OF PRESENT ILLNESS
[de-identified] : This is very nice 79-year-old gentleman experiencing chronic right knee pain, which is severe in intensity. He has right knee osteoarthritis. The pain substantially limits activities of daily living. Walking tolerance is reduced.  Tylenol helps mildly.  He does not take NSAIDs.  He does not use a cane or walker.  He has not tried physical therapy. Cortisone helped x 3 days. The patient denies any radiation of the pain to the feet and it is not associated with numbness, tingling, or weakness.

## 2024-08-08 NOTE — PHYSICAL EXAM
[de-identified] : Patient is well nourished, well-developed, in no acute distress, with appropriate mood and affect. The patient is oriented to time, place, and person. Respirations are even and unlabored. Gait evaluation does reveal a limp. There is no inguinal adenopathy. Examination of the contralateral knee shows normal range of motion, strength, no tenderness, and intact skin. The affected limb is well-perfused, without skin lesions, shows a grossly normal motor and sensory examination. Knee motion is significantly reduced and does cause significant pain. The knee moves from  degrees. The knee is stable within that range-of-motion to AP and ML stress. The alignment of the knee is 5 degrees varus. Muscle strength is normal. Pedal pulses are palpable. Hip examination was negative.\par   [de-identified] : Long standing knee, AP knee, lateral knee, and patellar views of the right knee were ordered and taken in the office and demonstrate degenerative joint disease of the knee with joint space narrowing, osteophyte formation, and subchondral sclerosis.

## 2024-08-08 NOTE — DISCUSSION/SUMMARY
[de-identified] : This patient has severe right knee osteoarthritis.  He has tried and failed a course of conservative management and would like to consider proceeding with a right total knee arthroplasty using robotic navigation for assistance.  The patient is an appropriate candidate for consideration of right total knee replacement. An extensive discussion was conducted of the natural history of the disease and the variety of surgical and non-surgical treatment options available to the patient. A risk/benefit analysis was discussed with the patient reviewing the advantages and disadvantages of surgical intervention at this time. Both the level and length of the patient's pain have made additional conservative treatment measures consisting of NSAIDs, physical therapy, corticosteroids, and/or viscosupplementation contraindicated. A full explanation was given of the nature and the purpose of the procedure and anesthesia, its benefits, possible alternative methods of diagnosis or treatment, the risks involved, the possibility of complications, the foreseeable consequences of the procedure and the possible results of the non-treatment. I reviewed the plan of care as well as used a model of a total knee implant equivalent to the one that will be used for their total knee joint replacement. The ability to secure the implant utilizing cement or cementless (press-fit) was discussed with the patient. The patient agrees with the plan of care, as well as the use of implants for their total knee replacement.   We also discussed that if robotic/computer navigation is utilized, then additional incisions may need to be made to accommodate the computer navigation arrays, which will be placed in the femur and tibia.  No guarantee or assurance was made as to the results that may be obtained. Specifically, the risks were identified to include, but are not limited to the following: Infection, phlebitis, pulmonary embolism, death, paralysis, dislocation, pain, stiffness, instability, limp, weakness, breakage, leg-length inequality, uncontrolled bleeding, nerve injury, blood vessel injury, pressure sores, anesthetic risks, delayed healing of wound and bone, and wear and loosening. Further discussion was undertaken with the patient about the details of surgical preparation, treatment, and postoperative rehabilitation including medical clearance, autotransfusion, the hospital course, and the postoperative rehabilitation involved. All in all, I feel that this patient is a good candidate for surgical reconstruction.  The patient and I discussed the option for 23 hour stay joint replacement. They will stay overnight in the hospital after surgery and will discharge home on the next day of surgery. The risks and benefits of this type of rapid recovery protocol was reviewed with the patient and they are in agreement with proceeding with 23 hour stay joint replacement surgery. They understand that in the event of an emergency, that they will be transferred to the main hospital for inpatient care.

## 2024-08-26 ENCOUNTER — NON-APPOINTMENT (OUTPATIENT)
Age: 79
End: 2024-08-26

## 2024-08-26 ENCOUNTER — APPOINTMENT (OUTPATIENT)
Dept: UROLOGY | Facility: CLINIC | Age: 79
End: 2024-08-26
Payer: MEDICARE

## 2024-08-26 VITALS — DIASTOLIC BLOOD PRESSURE: 79 MMHG | SYSTOLIC BLOOD PRESSURE: 137 MMHG

## 2024-08-26 DIAGNOSIS — N13.8 BENIGN PROSTATIC HYPERPLASIA WITH LOWER URINARY TRACT SYMPMS: ICD-10-CM

## 2024-08-26 DIAGNOSIS — R31.0 GROSS HEMATURIA: ICD-10-CM

## 2024-08-26 DIAGNOSIS — N40.1 BENIGN PROSTATIC HYPERPLASIA WITH LOWER URINARY TRACT SYMPMS: ICD-10-CM

## 2024-08-26 PROCEDURE — 99214 OFFICE O/P EST MOD 30 MIN: CPT | Mod: 57

## 2024-08-26 ASSESSMENT — KOOS JR
STRAIGHTENING KNEE FULLY: MODERATE
GOING UP OR DOWN STAIRS: EXTREME
RISING FROM SITTING: SEVERE
STANDING UPRIGHT: MODERATE
TWISING OR PIVOTING ON KNEE: EXTREME
HOW SEVERE IS YOUR KNEE STIFFNESS AFTER FIRST WAKING IN MORNING: SEVERE
KOOS JR RAW SCORE: 21
BENDING TO THE FLOOR TO PICK UP OBJECT: SEVERE

## 2024-08-26 NOTE — PHYSICAL EXAM
[Normal Appearance] : normal appearance [Well Groomed] : well groomed [General Appearance - In No Acute Distress] : no acute distress [Edema] : no peripheral edema [Respiration, Rhythm And Depth] : normal respiratory rhythm and effort [Exaggerated Use Of Accessory Muscles For Inspiration] : no accessory muscle use [Abdomen Soft] : soft [Abdomen Tenderness] : non-tender [Costovertebral Angle Tenderness] : no ~M costovertebral angle tenderness [Urinary Bladder Findings] : the bladder was normal on palpation [Normal Station and Gait] : the gait and station were normal for the patient's age [] : no rash [No Focal Deficits] : no focal deficits [Oriented To Time, Place, And Person] : oriented to person, place, and time [Mood] : the mood was normal [Affect] : the affect was normal

## 2024-08-26 NOTE — ASSESSMENT
[FreeTextEntry1] : 80 yo M with BPH and bleeding in his urine  #BPH A robotic simple prostatectomy is a minimally invasive surgical procedure that involves removing the prostate gland to treat benign prostatic hyperplasia (BPH). Before proceeding with the surgery, the risks, benefits, and alternatives of the procedure should be discussed with the patient.  Risks:  Bleeding Infection Urinary incontinence Erectile dysfunction Bladder neck contracture Urethral stricture Transurethral resection syndrome  Benefits:  Relief of urinary symptoms Improvement in quality of life Decreased risk of complications from BPH [place] hospital stay and recovery time compared to open surgery  Alternatives:  Medications such as alpha-blockers or 5-alpha reductase inhibitors Transurethral resection of the prostate (TURP) Laser prostatectomy

## 2024-08-26 NOTE — HISTORY OF PRESENT ILLNESS
[FreeTextEntry1] : HPI: Mr. Storey is a 76 year old male with hx of BPH, nephrolithiasis presented to the ED with AMS,  fevers and right flank pain. Patient found to have 7mm right distal ureteral  stone on CT scan. Patient emergently taken to the OR 9/5 for cystoscopy, right ureteral stent placement. Urine and blood cultures grew E.Coli on Zosyn IV. Repeat blood cultures no growth to date. Also found to have a 2-3 cm bladder stone. No nausea/vomiting, but has had fever at home to 102 since Saturday but did not want to go to the emergency room.   PVR today 35 cc. Nocturia previously 3-4x. Had prevoiusly seen Dr. Pringle with PSA to 10.8, mpMRI PIRADS1 lesion noted, 145g gland.   Anticoagulation: Previously on ASA All: None Social: lives at home, never smoker, never ETOH PMHx: BPH, nephrolithiasis FHx: gastric cancer PSHx: No prior surgeries Labs: Cr 0.9 9/6, UCx with E. coli, PIRADS1 lesion  Imaging: CT in PACS with 7 mm ureteral stone, 2 cm bladder stone  11/4: Here after admissoin to hospital. He was found to have R epididymoorchitis. WBC up to 16. He was found to have cardiology inferior infarct, seeing cardiology tomorrow. Finished cipro. Echo in hospital WNL, but needed stress test for clearance.   3/1: Here today s/p aquaablation and R URS. Has known L kidney stone on prior CT. US renal with 8 mm R stone. LL reviewed, low volume, hypocitrate <400 and elevated Grady. Recs discussed included increased water intake, lithotlyte 1 packet BID And salt reduction. Also complaining of urinary frequency. PVR today 26 cc.  7/5: Here for follow up for BPH (s/p aquaablation and R URS). 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 LL reviewed with low volume still, citraturia improved to >600. Grady still elevated with UCa elevated; discussed increasing water intake >2.5L and decreasing salt <2g a day. Occasionally has some urgency, overall very happy with voiding symptoms.   10/11: Here today after URS and BPH. ULS today no stones. Prevoid volume 200 cc, and PVR 73 cc. 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 LL with significantly improved volume, UCitrate >600, Grady much improved and has decreased salt intake. Urgency minimal, would like to try cialis for his erections.   4/11: HEre today after ureteroscopy and ULS today without stones.  cc today. Stable ?AML seen. LL with good volume, U citrate 300s but SS indices all wnl. Discussed he needs to continue citrate supplementation with 2 citrus fruits a day and low salt diet. PVR rising. Discussing starting tamsulosin and AE discussed with patient.   4/19: Here today for follow up.  Rt kidney with ULS with 7 mm cortical stone, AML stable 1.3 cm. LL today borderline volume at 1.95L, Ucit 483, discussed that he has run out of K cit. Will restart citrate supplementation and cittus fruits.  Feels he is incompletely emptying, PVR today. 51 cc. Nocturia x 0.   8/26: Here today with BPH now with gland size up to 235g gland. Given that his recurrent hematuria, and his irritative voiding symptoms q 1 hour he would like to consider intervention. PVR previously <100 cc. He states that his urinary symptoms have gotten worse despite having AA previously. [Urinary Incontinence] : no urinary incontinence [Urinary Retention] : urinary retention [Urinary Urgency] : no urinary urgency [Urinary Frequency] : no urinary frequency

## 2024-09-02 LAB — BACTERIA UR CULT: NORMAL

## 2024-09-05 ENCOUNTER — OUTPATIENT (OUTPATIENT)
Dept: OUTPATIENT SERVICES | Facility: HOSPITAL | Age: 79
LOS: 1 days | End: 2024-09-05
Payer: COMMERCIAL

## 2024-09-05 VITALS
WEIGHT: 190.04 LBS | OXYGEN SATURATION: 97 % | SYSTOLIC BLOOD PRESSURE: 142 MMHG | TEMPERATURE: 98 F | HEIGHT: 66 IN | HEART RATE: 80 BPM | DIASTOLIC BLOOD PRESSURE: 88 MMHG | RESPIRATION RATE: 20 BRPM

## 2024-09-05 DIAGNOSIS — Z96.0 PRESENCE OF UROGENITAL IMPLANTS: Chronic | ICD-10-CM

## 2024-09-05 DIAGNOSIS — Z98.890 OTHER SPECIFIED POSTPROCEDURAL STATES: Chronic | ICD-10-CM

## 2024-09-05 DIAGNOSIS — N40.1 BENIGN PROSTATIC HYPERPLASIA WITH LOWER URINARY TRACT SYMPTOMS: ICD-10-CM

## 2024-09-05 DIAGNOSIS — Z01.818 ENCOUNTER FOR OTHER PREPROCEDURAL EXAMINATION: ICD-10-CM

## 2024-09-05 DIAGNOSIS — Z98.49 CATARACT EXTRACTION STATUS, UNSPECIFIED EYE: Chronic | ICD-10-CM

## 2024-09-05 DIAGNOSIS — N13.8 OTHER OBSTRUCTIVE AND REFLUX UROPATHY: ICD-10-CM

## 2024-09-05 DIAGNOSIS — Z90.79 ACQUIRED ABSENCE OF OTHER GENITAL ORGAN(S): Chronic | ICD-10-CM

## 2024-09-05 DIAGNOSIS — G47.33 OBSTRUCTIVE SLEEP APNEA (ADULT) (PEDIATRIC): ICD-10-CM

## 2024-09-05 LAB
ANION GAP SERPL CALC-SCNC: 12 MMOL/L — SIGNIFICANT CHANGE UP (ref 5–17)
BLD GP AB SCN SERPL QL: NEGATIVE — SIGNIFICANT CHANGE UP
BUN SERPL-MCNC: 12 MG/DL — SIGNIFICANT CHANGE UP (ref 7–23)
CALCIUM SERPL-MCNC: 9.5 MG/DL — SIGNIFICANT CHANGE UP (ref 8.4–10.5)
CHLORIDE SERPL-SCNC: 105 MMOL/L — SIGNIFICANT CHANGE UP (ref 96–108)
CO2 SERPL-SCNC: 21 MMOL/L — LOW (ref 22–31)
CREAT SERPL-MCNC: 0.82 MG/DL — SIGNIFICANT CHANGE UP (ref 0.5–1.3)
EGFR: 88 ML/MIN/1.73M2 — SIGNIFICANT CHANGE UP
GLUCOSE SERPL-MCNC: 121 MG/DL — HIGH (ref 70–99)
HCT VFR BLD CALC: 46.5 % — SIGNIFICANT CHANGE UP (ref 39–50)
HGB BLD-MCNC: 14.7 G/DL — SIGNIFICANT CHANGE UP (ref 13–17)
MCHC RBC-ENTMCNC: 26.9 PG — LOW (ref 27–34)
MCHC RBC-ENTMCNC: 31.6 GM/DL — LOW (ref 32–36)
MCV RBC AUTO: 85 FL — SIGNIFICANT CHANGE UP (ref 80–100)
NRBC # BLD: 0 /100 WBCS — SIGNIFICANT CHANGE UP (ref 0–0)
PLATELET # BLD AUTO: 301 K/UL — SIGNIFICANT CHANGE UP (ref 150–400)
POTASSIUM SERPL-MCNC: 4.3 MMOL/L — SIGNIFICANT CHANGE UP (ref 3.5–5.3)
POTASSIUM SERPL-SCNC: 4.3 MMOL/L — SIGNIFICANT CHANGE UP (ref 3.5–5.3)
RBC # BLD: 5.47 M/UL — SIGNIFICANT CHANGE UP (ref 4.2–5.8)
RBC # FLD: 14.4 % — SIGNIFICANT CHANGE UP (ref 10.3–14.5)
RH IG SCN BLD-IMP: POSITIVE — SIGNIFICANT CHANGE UP
SODIUM SERPL-SCNC: 138 MMOL/L — SIGNIFICANT CHANGE UP (ref 135–145)
WBC # BLD: 5.17 K/UL — SIGNIFICANT CHANGE UP (ref 3.8–10.5)
WBC # FLD AUTO: 5.17 K/UL — SIGNIFICANT CHANGE UP (ref 3.8–10.5)

## 2024-09-05 PROCEDURE — 86850 RBC ANTIBODY SCREEN: CPT

## 2024-09-05 PROCEDURE — 80048 BASIC METABOLIC PNL TOTAL CA: CPT

## 2024-09-05 PROCEDURE — G0463: CPT

## 2024-09-05 PROCEDURE — 86901 BLOOD TYPING SEROLOGIC RH(D): CPT

## 2024-09-05 PROCEDURE — 86900 BLOOD TYPING SEROLOGIC ABO: CPT

## 2024-09-05 PROCEDURE — 85027 COMPLETE CBC AUTOMATED: CPT

## 2024-09-05 RX ORDER — SODIUM CHLORIDE 9 MG/ML
3 INJECTION INTRAMUSCULAR; INTRAVENOUS; SUBCUTANEOUS EVERY 8 HOURS
Refills: 0 | Status: DISCONTINUED | OUTPATIENT
Start: 2024-09-16 | End: 2024-09-17

## 2024-09-05 RX ORDER — LIDOCAINE HCL 20 MG/ML
0.2 VIAL (ML) INJECTION ONCE
Refills: 0 | Status: DISCONTINUED | OUTPATIENT
Start: 2024-09-16 | End: 2024-09-17

## 2024-09-05 RX ORDER — CHLORHEXIDINE GLUCONATE 40 MG/ML
1 SOLUTION TOPICAL ONCE
Refills: 0 | Status: DISCONTINUED | OUTPATIENT
Start: 2024-09-16 | End: 2024-09-17

## 2024-09-05 NOTE — H&P PST ADULT - NSICDXPASTSURGICALHX_GEN_ALL_CORE_FT
PAST SURGICAL HISTORY:  History of prostate biopsy     S/P cataract surgery 2011 - bilateral eyes    S/P TURP     S/P ureteral stent placement 9/5/2021

## 2024-09-05 NOTE — H&P PST ADULT - NSICDXPASTMEDICALHX_GEN_ALL_CORE_FT
PAST MEDICAL HISTORY:  BPH (benign prostatic hyperplasia)     Calculus of ureter right    E coli bacteremia 9/2021 - right nephrolithiasis with right ureteral stent placed, treated with Zosyn in hospital, discharged on Bactrim    Epididymoorchitis 10/2021 - on Cipro 500 mg twice daily    H/O vertigo

## 2024-09-05 NOTE — H&P PST ADULT - ASSESSMENT
DASI score: 7  DASI activity: walks dog 8 blocks 4 x day   Loose teeth or denture: none  CAPRINI SCORE    AGE RELATED RISK FACTORS                                                             [ ] Age 41-60 years                                            (1 Point)  [ ] Age: 61-74 years                                           (2 Points)                 [x ] Age= 75 years                                                (3 Points)             DISEASE RELATED RISK FACTORS                                                       [ ] Edema in the lower extremities                 (1 Point)                     [ ] Varicose veins                                               (1 Point)                                 [x ] BMI > 25 Kg/m2                                            (1 Point)                                  [ ] Serious infection (ie PNA)                            (1 Point)                     [ ] Lung disease ( COPD, Emphysema)            (1 Point)                                                                          [ ] Acute myocardial infarction                         (1 Point)                  [ ] Congestive heart failure (in the previous month)  (1 Point)         [ ] Inflammatory bowel disease                            (1 Point)                  [ ] Central venous access, PICC or Port               (2 points)       (within the last month)                                                                [ ] Stroke (in the previous month)                        (5 Points)    [ ] Previous or present malignancy                       (2 points)                                                                                                                                                         HEMATOLOGY RELATED FACTORS                                                         [ ] Prior episodes of VTE                                     (3 Points)                     [ ] Positive family history for VTE                      (3 Points)                  [ ] Prothrombin 33641 A                                     (3 Points)                     [ ] Factor V Leiden                                                (3 Points)                        [ ] Lupus anticoagulants                                      (3 Points)                                                           [ ] Anticardiolipin antibodies                              (3 Points)                                                       [ ] High homocysteine in the blood                   (3 Points)                                             [ ] Other congenital or acquired thrombophilia      (3 Points)                                                [ ] Heparin induced thrombocytopenia                  (3 Points)                                        MOBILITY RELATED FACTORS  [ ] Bed rest                                                         (1 Point)  [ ] Plaster cast                                                    (2 points)  [ ] Bed bound for more than 72 hours           (2 Points)    GENDER SPECIFIC FACTORS  [ ] Pregnancy or had a baby within the last month   (1 Point)  [ ] Post-partum < 6 weeks                                   (1 Point)  [ ] Hormonal therapy  or oral contraception   (1 Point)  [ ] History of pregnancy complications              (1 point)  [ ] Unexplained or recurrent              (1 Point)    OTHER RISK FACTORS                                           (1 Point)  [ ] BMI >40, smoking, diabetes requiring insulin, chemotherapy  blood transfusions and length of surgery over 2 hours    SURGERY RELATED RISK FACTORS  [ ]  Section within the last month     (1 Point)  [ ] Minor surgery                                                  (1 Point)  [ ] Arthroscopic surgery                                       (2 Points)  [x ] Planned major surgery lasting more            (2 Points)      than 45 minutes     [ ] Elective hip or knee joint replacement       (5 points)       surgery                                                TRAUMA RELATED RISK FACTORS  [ ] Fracture of the hip, pelvis, or leg                       (5 Points)  [ ] Spinal cord injury resulting in paralysis             (5 points)       (in the previous month)    [ ] Paralysis  (less than 1 month)                             (5 Points)  [ ] Multiple Trauma within 1 month                        (5 Points)    Total Score [  5      ]    Caprini Score 0-2: Low Risk, NO VTE prophylaxis required for most patients, encourage ambulation  Caprini Score 3-6: Moderate Risk , pharmacologic VTE prophylaxis is indicated for most patients (in the absence of contraindications)  Caprini Score Greater than or =7: High risk, pharmocologic VTE prophylaxis indicated for most patients (in the absence of contraindications)

## 2024-09-05 NOTE — H&P PST ADULT - HISTORY OF PRESENT ILLNESS
80 yo male with history of BPH , s/p TURP 20222, had episode of hematuria this spring 2024. Now for Robotic Prostatectomy. Pt continues to endorse increased urinary frequency and episodes of urinary incontinence.        *Scheduled for total knee replacement  9/24/24.

## 2024-09-15 ENCOUNTER — TRANSCRIPTION ENCOUNTER (OUTPATIENT)
Age: 79
End: 2024-09-15

## 2024-09-16 ENCOUNTER — RESULT REVIEW (OUTPATIENT)
Age: 79
End: 2024-09-16

## 2024-09-16 ENCOUNTER — INPATIENT (INPATIENT)
Facility: HOSPITAL | Age: 79
LOS: 0 days | Discharge: HOME | DRG: 726 | End: 2024-09-17
Attending: UROLOGY | Admitting: UROLOGY
Payer: COMMERCIAL

## 2024-09-16 ENCOUNTER — APPOINTMENT (OUTPATIENT)
Dept: UROLOGY | Facility: HOSPITAL | Age: 79
End: 2024-09-16

## 2024-09-16 VITALS
DIASTOLIC BLOOD PRESSURE: 87 MMHG | WEIGHT: 190.04 LBS | HEIGHT: 65.98 IN | RESPIRATION RATE: 16 BRPM | OXYGEN SATURATION: 97 % | TEMPERATURE: 98 F | HEART RATE: 88 BPM | SYSTOLIC BLOOD PRESSURE: 116 MMHG

## 2024-09-16 DIAGNOSIS — Z96.0 PRESENCE OF UROGENITAL IMPLANTS: Chronic | ICD-10-CM

## 2024-09-16 DIAGNOSIS — Z98.890 OTHER SPECIFIED POSTPROCEDURAL STATES: Chronic | ICD-10-CM

## 2024-09-16 DIAGNOSIS — N13.8 OTHER OBSTRUCTIVE AND REFLUX UROPATHY: ICD-10-CM

## 2024-09-16 DIAGNOSIS — Z90.79 ACQUIRED ABSENCE OF OTHER GENITAL ORGAN(S): Chronic | ICD-10-CM

## 2024-09-16 DIAGNOSIS — N40.1 BENIGN PROSTATIC HYPERPLASIA WITH LOWER URINARY TRACT SYMPTOMS: ICD-10-CM

## 2024-09-16 DIAGNOSIS — Z98.49 CATARACT EXTRACTION STATUS, UNSPECIFIED EYE: Chronic | ICD-10-CM

## 2024-09-16 LAB
ANION GAP SERPL CALC-SCNC: 14 MMOL/L — SIGNIFICANT CHANGE UP (ref 5–17)
BUN SERPL-MCNC: 14 MG/DL — SIGNIFICANT CHANGE UP (ref 7–23)
CALCIUM SERPL-MCNC: 9.1 MG/DL — SIGNIFICANT CHANGE UP (ref 8.4–10.5)
CHLORIDE SERPL-SCNC: 103 MMOL/L — SIGNIFICANT CHANGE UP (ref 96–108)
CO2 SERPL-SCNC: 23 MMOL/L — SIGNIFICANT CHANGE UP (ref 22–31)
CREAT SERPL-MCNC: 0.85 MG/DL — SIGNIFICANT CHANGE UP (ref 0.5–1.3)
EGFR: 87 ML/MIN/1.73M2 — SIGNIFICANT CHANGE UP
GLUCOSE SERPL-MCNC: 157 MG/DL — HIGH (ref 70–99)
HCT VFR BLD CALC: 47.7 % — SIGNIFICANT CHANGE UP (ref 39–50)
HGB BLD-MCNC: 15 G/DL — SIGNIFICANT CHANGE UP (ref 13–17)
MCHC RBC-ENTMCNC: 27.3 PG — SIGNIFICANT CHANGE UP (ref 27–34)
MCHC RBC-ENTMCNC: 31.4 GM/DL — LOW (ref 32–36)
MCV RBC AUTO: 86.9 FL — SIGNIFICANT CHANGE UP (ref 80–100)
NRBC # BLD: 0 /100 WBCS — SIGNIFICANT CHANGE UP (ref 0–0)
PLATELET # BLD AUTO: 290 K/UL — SIGNIFICANT CHANGE UP (ref 150–400)
POTASSIUM SERPL-MCNC: 4.2 MMOL/L — SIGNIFICANT CHANGE UP (ref 3.5–5.3)
POTASSIUM SERPL-SCNC: 4.2 MMOL/L — SIGNIFICANT CHANGE UP (ref 3.5–5.3)
RBC # BLD: 5.49 M/UL — SIGNIFICANT CHANGE UP (ref 4.2–5.8)
RBC # FLD: 14.2 % — SIGNIFICANT CHANGE UP (ref 10.3–14.5)
SODIUM SERPL-SCNC: 140 MMOL/L — SIGNIFICANT CHANGE UP (ref 135–145)
WBC # BLD: 12.9 K/UL — HIGH (ref 3.8–10.5)
WBC # FLD AUTO: 12.9 K/UL — HIGH (ref 3.8–10.5)

## 2024-09-16 PROCEDURE — 88307 TISSUE EXAM BY PATHOLOGIST: CPT | Mod: 26

## 2024-09-16 PROCEDURE — 55867 LAPS SURG PRST8ECT SMPL STOT: CPT

## 2024-09-16 PROCEDURE — 88344 IMHCHEM/IMCYTCHM EA MLT ANTB: CPT | Mod: 26

## 2024-09-16 DEVICE — LIGATING CLIPS WECK HEMOLOK POLYMER LARGE (PURPLE) 6: Type: IMPLANTABLE DEVICE | Status: FUNCTIONAL

## 2024-09-16 RX ORDER — CEFAZOLIN SODIUM 2 G/100ML
1000 INJECTION, SOLUTION INTRAVENOUS EVERY 8 HOURS
Refills: 0 | Status: DISCONTINUED | OUTPATIENT
Start: 2024-09-16 | End: 2024-09-17

## 2024-09-16 RX ORDER — CEFAZOLIN SODIUM 2 G/100ML
2000 INJECTION, SOLUTION INTRAVENOUS ONCE
Refills: 0 | Status: COMPLETED | OUTPATIENT
Start: 2024-09-16 | End: 2024-09-16

## 2024-09-16 RX ORDER — FINASTERIDE 1 MG/1
5 TABLET, FILM COATED ORAL DAILY
Refills: 0 | Status: DISCONTINUED | OUTPATIENT
Start: 2024-09-16 | End: 2024-09-17

## 2024-09-16 RX ORDER — OXYCODONE HYDROCHLORIDE 5 MG/1
5 TABLET ORAL EVERY 6 HOURS
Refills: 0 | Status: DISCONTINUED | OUTPATIENT
Start: 2024-09-16 | End: 2024-09-17

## 2024-09-16 RX ORDER — ONDANSETRON 2 MG/ML
4 INJECTION, SOLUTION INTRAMUSCULAR; INTRAVENOUS ONCE
Refills: 0 | Status: DISCONTINUED | OUTPATIENT
Start: 2024-09-16 | End: 2024-09-17

## 2024-09-16 RX ORDER — KETOROLAC TROMETHAMINE 30 MG/ML
15 INJECTION, SOLUTION INTRAMUSCULAR EVERY 6 HOURS
Refills: 0 | Status: DISCONTINUED | OUTPATIENT
Start: 2024-09-16 | End: 2024-09-17

## 2024-09-16 RX ORDER — HYDROMORPHONE HYDROCHLORIDE 2 MG/1
0.5 TABLET ORAL
Refills: 0 | Status: DISCONTINUED | OUTPATIENT
Start: 2024-09-16 | End: 2024-09-17

## 2024-09-16 RX ORDER — HEPARIN SODIUM,BOVINE 1000/ML
5000 VIAL (ML) INJECTION EVERY 8 HOURS
Refills: 0 | Status: DISCONTINUED | OUTPATIENT
Start: 2024-09-16 | End: 2024-09-17

## 2024-09-16 RX ORDER — ASPIRIN 81 MG
81 TABLET, DELAYED RELEASE (ENTERIC COATED) ORAL DAILY
Refills: 0 | Status: DISCONTINUED | OUTPATIENT
Start: 2024-09-16 | End: 2024-09-17

## 2024-09-16 RX ORDER — ACETAMINOPHEN 325 MG/1
975 TABLET ORAL EVERY 6 HOURS
Refills: 0 | Status: DISCONTINUED | OUTPATIENT
Start: 2024-09-16 | End: 2024-09-17

## 2024-09-16 RX ORDER — FLU VACCINE TS 2012-2013(5YR+) 45MCG/.5ML
0.5 VIAL (ML) INTRAMUSCULAR ONCE
Refills: 0 | Status: DISCONTINUED | OUTPATIENT
Start: 2024-09-16 | End: 2024-09-17

## 2024-09-16 RX ADMIN — Medication 125 MILLILITER(S): at 20:00

## 2024-09-16 RX ADMIN — Medication 5000 UNIT(S): at 22:05

## 2024-09-16 RX ADMIN — CEFAZOLIN SODIUM 100 MILLIGRAM(S): 2 INJECTION, SOLUTION INTRAVENOUS at 22:05

## 2024-09-16 RX ADMIN — KETOROLAC TROMETHAMINE 15 MILLIGRAM(S): 30 INJECTION, SOLUTION INTRAMUSCULAR at 22:30

## 2024-09-16 RX ADMIN — SODIUM CHLORIDE 3 MILLILITER(S): 9 INJECTION INTRAMUSCULAR; INTRAVENOUS; SUBCUTANEOUS at 22:06

## 2024-09-16 RX ADMIN — KETOROLAC TROMETHAMINE 15 MILLIGRAM(S): 30 INJECTION, SOLUTION INTRAMUSCULAR at 22:06

## 2024-09-16 NOTE — PRE-OP CHECKLIST - PATIENT'S PERSONAL PROPERTY GIVEN TO
[de-identified] : The patient comes in today for his left knee.  He states he is still having some complaints but he is feeling a little bit better.  
family member

## 2024-09-16 NOTE — BRIEF OPERATIVE NOTE - OPERATION/FINDINGS
Procedure: RA laparoscopic simple prostatectomy  Preop dx: BPH  Postop dx: BPH Procedure: RA laparoscopic simple prostatectomy  Preop dx: BPH  Postop dx: BPH  Dictation: [id number]

## 2024-09-16 NOTE — PATIENT PROFILE ADULT - FALL HARM RISK - HARM RISK INTERVENTIONS
Assistance with ambulation/Assistance OOB with selected safe patient handling equipment/Communicate Risk of Fall with Harm to all staff/Discuss with provider need for PT consult/Monitor gait and stability/Provide patient with walking aids - walker, cane, crutches/Reinforce activity limits and safety measures with patient and family/Sit up slowly, dangle for a short time, stand at bedside before walking/Tailored Fall Risk Interventions/Visual Cue: Yellow wristband and red socks/Bed in lowest position, wheels locked, appropriate side rails in place/Call bell, personal items and telephone in reach/Instruct patient to call for assistance before getting out of bed or chair/Non-slip footwear when patient is out of bed/[place] to call system/Physically safe environment - no spills, clutter or unnecessary equipment/Purposeful Proactive Rounding/Room/bathroom lighting operational, light cord in reach

## 2024-09-16 NOTE — PATIENT PROFILE ADULT - NSPROPASSIVESMOKEEXPOSURE_GEN_A_NUR
[address] [place], [address]  Tel.  [phone]  Fax. [address] [facility] [address]  [place], [address] [facility]  Tel.  [phone]  Fax. [phone] [address] [facility] Govind Mckinney   Tel.  [phone]  Fax. [phone]       Chief Complaint       Chief Complaint   Patient presents with    Sleep Problem     NP refd by Dr. Ramakrishna Crooks for CARLOS        HPI      Giselle Cherry is 46 y.o. male seen for evaluation of a sleep disorder. He notes being diagnosed with sleep apnea 5-6 years ago or longer when he was living in [place]. He was started on CPAP at that time. He is unable to provide more details regarding that evaluation. Since moving to [place] he has been obtaining his supplies intermittently through [facility].    He notes approximately 45 pound weight loss since his initial assessment. Currently he retires between 9-10 PM and awakens at 6: 27.  He notes that he is rested on awakening and is not fatigued during the day. He denies vivid dreaming or nightmares, sleep talking or sleepwalking, bruxism or nocturnal incontinence, abnormal arm or leg movements, hypnagogic hallucinations, sleep paralysis or cataplexy. The patient has not undergone recent diagnostic testing for the current problems. [place] Sleepiness Score: 0       No Known Allergies    Current Outpatient Medications   Medication Sig Dispense Refill    metoprolol succinate (TOPROL-XL) 50 mg XL tablet TAKE 1 TABLET BY MOUTH EVERY DAY  2    oxyCODONE IR (ROXICODONE) 5 mg immediate release tablet TAKE 1 TABLET BY MOUTH EVERY 4 HOURS AS NEEDED FOR PAIN  0    amLODIPine (NORVASC) 5 mg tablet TAKE 1 TABLET BY MOUTH EVERY DAY      olmesartan (BENICAR) 20 mg tablet TAKE 1 TABLET BY MOUTH EVERY DAY 90 Tab 0    cyclobenzaprine (FLEXERIL) 5 mg tablet Take 1 Tab by mouth three (3) times daily as needed for Muscle Spasm(s).  30 Tab 0        He  has a past medical history of Hypertension. He  has a past surgical history that includes hx cholecystectomy (06/25/2019). He family history is not on file. He  reports that he has never smoked. He has never used smokeless tobacco. He reports that he does not drink alcohol or use drugs. Review of Systems:  Review of Systems   Eyes: Negative for blurred vision and double vision. Respiratory: Negative for cough and shortness of breath. Cardiovascular: Negative for chest pain and palpitations. Gastrointestinal: Negative for abdominal pain and heartburn. Genitourinary: Negative for frequency and urgency. Musculoskeletal: Negative for back pain and neck pain. Neurological: Negative for headaches. Endo/Heme/Allergies: Does not bruise/bleed easily. Psychiatric/Behavioral: Negative for depression. Objective:     Visit Vitals  BP (!) 168/107 (BP 1 Location: Left arm, BP Patient Position: Sitting)   Pulse (!) 101   Ht 5' 9\" (1.753 m)   Wt 345 lb (156.5 kg)   SpO2 93%   BMI 50.95 kg/m²     Body mass index is 50.95 kg/m². General:   Conversant, cooperative   Eyes:  Pupils equal and reactive, no nystagmus   Oropharynx:   Mallampati score II, tongue scalloped   Tonsils:   tonsils   Neck:   No carotid bruits; Neck circ. in \"inches\": 18   Chest/Lungs:  Clear on auscultation    CVS:  Normal rate, regular rhythm   Skin:  Warm to touch; no obvious rashes   Neuro:  Speech fluent, face symmetrical, tongue movement normal   Psych:  Normal affect,  normal countenance        Assessment:       ICD-10-CM ICD-9-CM    1. CARLOS (obstructive sleep apnea) G47.33 327.23      History of sleep apnea. Patient on CPAP without follow-up since his initial assessment. He is unable to provide information in this regard. He has lost 45 pounds since his evaluation. He will be reevaluated with a home sleep test.  We will bring his unit to the office for compliance review.     Plan:     No orders of the defined types were placed in this encounter. * Patient has a history and examination consistent with the diagnosis of sleep apnea. * Sleep testing was ordered for reevaluation. * He was provided information on sleep apnea including corresponding risk factors and the importance of proper treatment. * Treatment options if indicated were reviewed today. Instructions:  o The patient would benefit from weight reduction measures. o Do not engage in activities requiring a normal degree of alertness if fatigue is present. o The patient understands that untreated or undertreated sleep apnea could impair judgement and the ability to function normally during the day.  o Call or return if symptoms worsen or persist.          David Rader MD, FAA  Electronically signed 07/01/19       This note was created using voice recognition software. Despite editing, there may be syntax errors. This note will not be viewable in [address] No

## 2024-09-16 NOTE — PROGRESS NOTE ADULT - ASSESSMENT
A/P: 79y Male s/p RA laparoscopic simple prostatectomy, on CBI    Continue CBI, will clamp in AM  Continue Trujillo  Supp in AM if no flatus  DVT prophylaxis/OOB  Incentive spirometry  Strict I&O's  Analgesia and antiemetics as needed  Diet - Clear liquid  AM labs

## 2024-09-17 ENCOUNTER — TRANSCRIPTION ENCOUNTER (OUTPATIENT)
Age: 79
End: 2024-09-17

## 2024-09-17 VITALS
SYSTOLIC BLOOD PRESSURE: 110 MMHG | RESPIRATION RATE: 14 BRPM | HEART RATE: 92 BPM | OXYGEN SATURATION: 98 % | DIASTOLIC BLOOD PRESSURE: 61 MMHG

## 2024-09-17 PROBLEM — G47.33 OBSTRUCTIVE SLEEP APNEA (ADULT) (PEDIATRIC): Chronic | Status: ACTIVE | Noted: 2024-09-05

## 2024-09-17 LAB
ANION GAP SERPL CALC-SCNC: 10 MMOL/L — SIGNIFICANT CHANGE UP (ref 5–17)
BUN SERPL-MCNC: 14 MG/DL — SIGNIFICANT CHANGE UP (ref 7–23)
CALCIUM SERPL-MCNC: 8.7 MG/DL — SIGNIFICANT CHANGE UP (ref 8.4–10.5)
CHLORIDE SERPL-SCNC: 102 MMOL/L — SIGNIFICANT CHANGE UP (ref 96–108)
CO2 SERPL-SCNC: 21 MMOL/L — LOW (ref 22–31)
CREAT SERPL-MCNC: 0.88 MG/DL — SIGNIFICANT CHANGE UP (ref 0.5–1.3)
EGFR: 86 ML/MIN/1.73M2 — SIGNIFICANT CHANGE UP
GLUCOSE SERPL-MCNC: 141 MG/DL — HIGH (ref 70–99)
HCT VFR BLD CALC: 41 % — SIGNIFICANT CHANGE UP (ref 39–50)
HGB BLD-MCNC: 13.3 G/DL — SIGNIFICANT CHANGE UP (ref 13–17)
MCHC RBC-ENTMCNC: 27.7 PG — SIGNIFICANT CHANGE UP (ref 27–34)
MCHC RBC-ENTMCNC: 32.4 GM/DL — SIGNIFICANT CHANGE UP (ref 32–36)
MCV RBC AUTO: 85.4 FL — SIGNIFICANT CHANGE UP (ref 80–100)
NRBC # BLD: 0 /100 WBCS — SIGNIFICANT CHANGE UP (ref 0–0)
PLATELET # BLD AUTO: 260 K/UL — SIGNIFICANT CHANGE UP (ref 150–400)
POTASSIUM SERPL-MCNC: 4.5 MMOL/L — SIGNIFICANT CHANGE UP (ref 3.5–5.3)
POTASSIUM SERPL-SCNC: 4.5 MMOL/L — SIGNIFICANT CHANGE UP (ref 3.5–5.3)
RBC # BLD: 4.8 M/UL — SIGNIFICANT CHANGE UP (ref 4.2–5.8)
RBC # FLD: 13.8 % — SIGNIFICANT CHANGE UP (ref 10.3–14.5)
SODIUM SERPL-SCNC: 133 MMOL/L — LOW (ref 135–145)
WBC # BLD: 13.22 K/UL — HIGH (ref 3.8–10.5)
WBC # FLD AUTO: 13.22 K/UL — HIGH (ref 3.8–10.5)

## 2024-09-17 PROCEDURE — 88344 IMHCHEM/IMCYTCHM EA MLT ANTB: CPT

## 2024-09-17 PROCEDURE — S2900: CPT

## 2024-09-17 PROCEDURE — 88307 TISSUE EXAM BY PATHOLOGIST: CPT

## 2024-09-17 PROCEDURE — 80048 BASIC METABOLIC PNL TOTAL CA: CPT

## 2024-09-17 PROCEDURE — 85027 COMPLETE CBC AUTOMATED: CPT

## 2024-09-17 PROCEDURE — C9399: CPT

## 2024-09-17 PROCEDURE — C1889: CPT

## 2024-09-17 RX ORDER — OXYCODONE AND ACETAMINOPHEN 7.5; 325 MG/1; MG/1
1 TABLET ORAL
Qty: 12 | Refills: 0
Start: 2024-09-17

## 2024-09-17 RX ORDER — SULFAMETHOXAZOLE AND TRIMETHOPRIM 800; 160 MG/1; MG/1
1 TABLET ORAL
Qty: 14 | Refills: 0
Start: 2024-09-17 | End: 2024-09-23

## 2024-09-17 RX ADMIN — SODIUM CHLORIDE 3 MILLILITER(S): 9 INJECTION INTRAMUSCULAR; INTRAVENOUS; SUBCUTANEOUS at 05:42

## 2024-09-17 RX ADMIN — ACETAMINOPHEN 975 MILLIGRAM(S): 325 TABLET ORAL at 00:18

## 2024-09-17 RX ADMIN — ACETAMINOPHEN 975 MILLIGRAM(S): 325 TABLET ORAL at 05:38

## 2024-09-17 RX ADMIN — Medication 5000 UNIT(S): at 05:39

## 2024-09-17 RX ADMIN — Medication 125 MILLILITER(S): at 05:38

## 2024-09-17 RX ADMIN — ACETAMINOPHEN 975 MILLIGRAM(S): 325 TABLET ORAL at 06:50

## 2024-09-17 RX ADMIN — CEFAZOLIN SODIUM 100 MILLIGRAM(S): 2 INJECTION, SOLUTION INTRAVENOUS at 05:39

## 2024-09-17 RX ADMIN — Medication 75 MILLILITER(S): at 08:21

## 2024-09-17 RX ADMIN — ACETAMINOPHEN 975 MILLIGRAM(S): 325 TABLET ORAL at 01:00

## 2024-09-17 NOTE — PROGRESS NOTE ADULT - ASSESSMENT
s/p robotic assisted laparoscopic simple prostatectomy POD#1    -am labs  -ancef  -advance diet  -OOB  -discharge planning

## 2024-09-17 NOTE — DISCHARGE NOTE PROVIDER - NSDCFUSCHEDAPPT_GEN_ALL_CORE_FT
Cristobal Paris  [facility] Pre S[facility]  Scheduled Appointment: 09/24/2024    [facility]  ORTHOSURG [address]  Scheduled Appointment: 10/09/2024    [facility]  Scheduled Appointment: 10/09/2024

## 2024-09-17 NOTE — DISCHARGE NOTE PROVIDER - HOSPITAL COURSE
pt admitted after elective robotic simple prostatectomy. no immediate post op complications. CBI ran overnight and clamped in am. urine remained an acceptable light clear red. labs and vitals signs stable. pt passing flatus and tolerating po. stable for discharge home with rodrigues in place

## 2024-09-17 NOTE — DISCHARGE NOTE PROVIDER - NSDCMRMEDTOKEN_GEN_ALL_CORE_FT
aspirin 81 mg oral tablet: orally once a day  Bactrim  mg-160 mg oral tablet: 1 tab(s) orally 2 times a day  finasteride 5 mg oral tablet: 1 tab(s) orally once a day  folic acid: 1 once a day  Percocet 5 mg-325 mg oral tablet: 1 tab(s) orally every 6 hours as needed for  severe pain MDD: 4tabs/day  tamsulosin 0.4 mg oral capsule: 1 cap(s) orally once a day (at bedtime)  Vitamin C 500 mg oral tablet: 2 tab(s) orally once a day

## 2024-09-17 NOTE — DISCHARGE NOTE NURSING/CASE MANAGEMENT/SOCIAL WORK - PATIENT PORTAL LINK FT
You can access the FollowMyHealth Patient Portal offered by [facility] by registering at the following website: [email or web address]. By joining [facility]’s FollowMyHealth portal, you will also be able to view your health information using other applications (apps) compatible with our system.

## 2024-09-17 NOTE — PROGRESS NOTE ADULT - SUBJECTIVE AND OBJECTIVE BOX
Post op Check    Pt seen and examined without complaints. States mild lower abdominal discomfort. Pain is controlled. Denies fever, chills, SOB/CP/N/V.     Vital Signs Last 24 Hrs  T(C): 36.1 (16 Sep 2024 18:06), Max: 36.4 (16 Sep 2024 10:55)  T(F): 97 (16 Sep 2024 18:06), Max: 97.5 (16 Sep 2024 10:55)  HR: 82 (16 Sep 2024 19:30) (70 - 88)  BP: 166/86 (16 Sep 2024 19:30) (116/87 - 172/88)  BP(mean): 119 (16 Sep 2024 19:15) (102 - 122)  RR: 16 (16 Sep 2024 19:30) (16 - 16)  SpO2: 95% (16 Sep 2024 19:30) (93% - 97%)    Parameters below as of 16 Sep 2024 18:06  Patient On (Oxygen Delivery Method): nasal cannula  O2 Flow (L/min): 4      I&O's Summary      Physical Exam  Gen: NAD, A&Ox3  Pulm: No respiratory distress, no subcostal retractions  CV: RRR, no JVD  Abd: Soft, NT, ND  : Trujillo in place, on CBI, slow drip with clear light pink color                  
UROLOGY PROGRESS NOTE:     Subjective: Patient seen and examined at bedside. No events overnight      Objective:  Vital signs  T(F): , Max: 98.4 (09-17-24 @ 06:00)  HR: 75 (09-17-24 @ 08:00)  BP: 119/64 (09-17-24 @ 06:00)  SpO2: 96% (09-17-24 @ 08:00)  Wt(kg): --    Output     I&O's Detail    16 Sep 2024 07:01  -  17 Sep 2024 07:00  --------------------------------------------------------  IN:    IV PiggyBack: 100 mL    Lactated Ringers: 1500 mL    Oral Fluid: 690 mL  Total IN: 2290 mL    OUT:    Indwelling Catheter - Urethral (mL): 175 mL  Total OUT: 175 mL    Total NET: 2115 mL      17 Sep 2024 07:01  -  17 Sep 2024 08:06  --------------------------------------------------------  IN:    Lactated Ringers: 125 mL    Oral Fluid: 500 mL  Total IN: 625 mL    OUT:    Indwelling Catheter - Urethral (mL): 75 mL  Total OUT: 75 mL    Total NET: 550 mL          Physical Exam:  Gen: no acute distress  Abd: soft nt nd  : rodrigues in place, CBI off,  urine clear pink    Labs:                        13.3   13.22 )-----------( 260      ( 17 Sep 2024 05:50 )             41.0     09-17    133<L>  |  102  |  14  ----------------------------<  141<H>  4.5   |  21<L>  |  0.88    Ca    8.7      17 Sep 2024 05:50

## 2024-09-17 NOTE — DISCHARGE NOTE PROVIDER - CARE PROVIDER_API CALL
Obi Alvarado  Urology  [address] [place] [address]  Phone: [phone]  Fax: [phone]  Established Patient  Follow Up Time: 1 week

## 2024-09-17 NOTE — DISCHARGE NOTE NURSING/CASE MANAGEMENT/SOCIAL WORK - NSDCPEFALRISK_GEN_ALL_CORE
For information on Fall & Injury Prevention, visit: [email or web address] OR  [email or web address] OR  https://www.cdc.gov/steadi/patient.html

## 2024-09-17 NOTE — DISCHARGE NOTE PROVIDER - CARE PROVIDERS DIRECT ADDRESSES
[email or web address]

## 2024-09-17 NOTE — DISCHARGE NOTE PROVIDER - NSDCFUADDINST_GEN_ALL_CORE_FT
no heavy lifting on strenuous activity. stay well hydrated. it is normal to have some blood in the urine. call the office if urine becomes very thick with clots.

## 2024-09-20 PROBLEM — Z87.898 PERSONAL HISTORY OF OTHER SPECIFIED CONDITIONS: Chronic | Status: ACTIVE | Noted: 2024-09-05

## 2024-09-23 ENCOUNTER — APPOINTMENT (OUTPATIENT)
Dept: UROLOGY | Facility: CLINIC | Age: 79
End: 2024-09-23
Payer: MEDICARE

## 2024-09-23 ENCOUNTER — OUTPATIENT (OUTPATIENT)
Dept: OUTPATIENT SERVICES | Facility: HOSPITAL | Age: 79
LOS: 1 days | End: 2024-09-23
Payer: COMMERCIAL

## 2024-09-23 VITALS — DIASTOLIC BLOOD PRESSURE: 78 MMHG | SYSTOLIC BLOOD PRESSURE: 123 MMHG | HEART RATE: 88 BPM

## 2024-09-23 DIAGNOSIS — Z98.49 CATARACT EXTRACTION STATUS, UNSPECIFIED EYE: Chronic | ICD-10-CM

## 2024-09-23 DIAGNOSIS — Z96.0 PRESENCE OF UROGENITAL IMPLANTS: Chronic | ICD-10-CM

## 2024-09-23 DIAGNOSIS — Z90.79 ACQUIRED ABSENCE OF OTHER GENITAL ORGAN(S): Chronic | ICD-10-CM

## 2024-09-23 DIAGNOSIS — R35.0 FREQUENCY OF MICTURITION: ICD-10-CM

## 2024-09-23 PROCEDURE — 51700 IRRIGATION OF BLADDER: CPT | Mod: 58

## 2024-09-23 PROCEDURE — 51700 IRRIGATION OF BLADDER: CPT

## 2024-09-24 ENCOUNTER — OUTPATIENT (OUTPATIENT)
Dept: OUTPATIENT SERVICES | Facility: HOSPITAL | Age: 79
LOS: 1 days | End: 2024-09-24
Payer: COMMERCIAL

## 2024-09-24 VITALS
RESPIRATION RATE: 20 BRPM | HEART RATE: 95 BPM | HEIGHT: 65 IN | DIASTOLIC BLOOD PRESSURE: 85 MMHG | WEIGHT: 186.95 LBS | SYSTOLIC BLOOD PRESSURE: 131 MMHG | OXYGEN SATURATION: 96 % | TEMPERATURE: 99 F

## 2024-09-24 DIAGNOSIS — Z90.79 ACQUIRED ABSENCE OF OTHER GENITAL ORGAN(S): Chronic | ICD-10-CM

## 2024-09-24 DIAGNOSIS — G47.33 OBSTRUCTIVE SLEEP APNEA (ADULT) (PEDIATRIC): ICD-10-CM

## 2024-09-24 DIAGNOSIS — M17.11 UNILATERAL PRIMARY OSTEOARTHRITIS, RIGHT KNEE: ICD-10-CM

## 2024-09-24 DIAGNOSIS — Z01.818 ENCOUNTER FOR OTHER PREPROCEDURAL EXAMINATION: ICD-10-CM

## 2024-09-24 DIAGNOSIS — Z98.890 OTHER SPECIFIED POSTPROCEDURAL STATES: Chronic | ICD-10-CM

## 2024-09-24 DIAGNOSIS — N40.1 BENIGN PROSTATIC HYPERPLASIA WITH LOWER URINARY TRACT SYMPTOMS: ICD-10-CM

## 2024-09-24 DIAGNOSIS — Z96.0 PRESENCE OF UROGENITAL IMPLANTS: Chronic | ICD-10-CM

## 2024-09-24 DIAGNOSIS — Z98.49 CATARACT EXTRACTION STATUS, UNSPECIFIED EYE: Chronic | ICD-10-CM

## 2024-09-24 LAB
A1C WITH ESTIMATED AVERAGE GLUCOSE RESULT: 6.4 % — HIGH (ref 4–5.6)
ANION GAP SERPL CALC-SCNC: 14 MMOL/L — SIGNIFICANT CHANGE UP (ref 5–17)
BUN SERPL-MCNC: 14 MG/DL — SIGNIFICANT CHANGE UP (ref 7–23)
CALCIUM SERPL-MCNC: 9.7 MG/DL — SIGNIFICANT CHANGE UP (ref 8.4–10.5)
CHLORIDE SERPL-SCNC: 100 MMOL/L — SIGNIFICANT CHANGE UP (ref 96–108)
CO2 SERPL-SCNC: 22 MMOL/L — SIGNIFICANT CHANGE UP (ref 22–31)
CREAT SERPL-MCNC: 1.14 MG/DL — SIGNIFICANT CHANGE UP (ref 0.5–1.3)
EGFR: 65 ML/MIN/1.73M2 — SIGNIFICANT CHANGE UP
ESTIMATED AVERAGE GLUCOSE: 137 MG/DL — HIGH (ref 68–114)
GLUCOSE SERPL-MCNC: 110 MG/DL — HIGH (ref 70–99)
HCT VFR BLD CALC: 41.1 % — SIGNIFICANT CHANGE UP (ref 39–50)
HGB BLD-MCNC: 13.1 G/DL — SIGNIFICANT CHANGE UP (ref 13–17)
MCHC RBC-ENTMCNC: 27.1 PG — SIGNIFICANT CHANGE UP (ref 27–34)
MCHC RBC-ENTMCNC: 31.9 GM/DL — LOW (ref 32–36)
MCV RBC AUTO: 84.9 FL — SIGNIFICANT CHANGE UP (ref 80–100)
MRSA PCR RESULT.: SIGNIFICANT CHANGE UP
NRBC # BLD: 0 /100 WBCS — SIGNIFICANT CHANGE UP (ref 0–0)
PLATELET # BLD AUTO: 381 K/UL — SIGNIFICANT CHANGE UP (ref 150–400)
POTASSIUM SERPL-MCNC: 4.3 MMOL/L — SIGNIFICANT CHANGE UP (ref 3.5–5.3)
POTASSIUM SERPL-SCNC: 4.3 MMOL/L — SIGNIFICANT CHANGE UP (ref 3.5–5.3)
RBC # BLD: 4.84 M/UL — SIGNIFICANT CHANGE UP (ref 4.2–5.8)
RBC # FLD: 14.6 % — HIGH (ref 10.3–14.5)
S AUREUS DNA NOSE QL NAA+PROBE: SIGNIFICANT CHANGE UP
SODIUM SERPL-SCNC: 136 MMOL/L — SIGNIFICANT CHANGE UP (ref 135–145)
WBC # BLD: 7.31 K/UL — SIGNIFICANT CHANGE UP (ref 3.8–10.5)
WBC # FLD AUTO: 7.31 K/UL — SIGNIFICANT CHANGE UP (ref 3.8–10.5)

## 2024-09-24 PROCEDURE — 73700 CT LOWER EXTREMITY W/O DYE: CPT | Mod: 26,RT,MC

## 2024-09-24 PROCEDURE — 83036 HEMOGLOBIN GLYCOSYLATED A1C: CPT

## 2024-09-24 PROCEDURE — G0463: CPT

## 2024-09-24 PROCEDURE — 73700 CT LOWER EXTREMITY W/O DYE: CPT | Mod: MC

## 2024-09-24 PROCEDURE — 85027 COMPLETE CBC AUTOMATED: CPT

## 2024-09-24 PROCEDURE — 87641 MR-STAPH DNA AMP PROBE: CPT

## 2024-09-24 PROCEDURE — 87640 STAPH A DNA AMP PROBE: CPT

## 2024-09-24 PROCEDURE — 36415 COLL VENOUS BLD VENIPUNCTURE: CPT

## 2024-09-24 PROCEDURE — 80048 BASIC METABOLIC PNL TOTAL CA: CPT

## 2024-09-24 RX ORDER — FOLIC ACID 1 MG
1 TABLET ORAL
Refills: 0 | DISCHARGE

## 2024-09-24 RX ORDER — ASPIRIN 81 MG
0 TABLET, DELAYED RELEASE (ENTERIC COATED) ORAL
Refills: 0 | DISCHARGE

## 2024-09-24 NOTE — H&P PST ADULT - ASSESSMENT
DASI score: 7  DASI activity: walks dog 8 blocks 4 x day   Loose teeth or denture: none  CAPRINI SCORE    AGE RELATED RISK FACTORS                                                             [ ] Age 41-60 years                                            (1 Point)  [ ] Age: 61-74 years                                           (2 Points)                 [x ] Age= 75 years                                                (3 Points)             DISEASE RELATED RISK FACTORS                                                       [ ] Edema in the lower extremities                 (1 Point)                     [ ] Varicose veins                                               (1 Point)                                 [x ] BMI > 25 Kg/m2                                            (1 Point)                                  [ ] Serious infection (ie PNA)                            (1 Point)                     [ ] Lung disease ( COPD, Emphysema)            (1 Point)                                                                          [ ] Acute myocardial infarction                         (1 Point)                  [ ] Congestive heart failure (in the previous month)  (1 Point)         [ ] Inflammatory bowel disease                            (1 Point)                  [ ] Central venous access, PICC or Port               (2 points)       (within the last month)                                                                [ ] Stroke (in the previous month)                        (5 Points)    [ ] Previous or present malignancy                       (2 points)                                                                                                                                                         HEMATOLOGY RELATED FACTORS                                                         [ ] Prior episodes of VTE                                     (3 Points)                     [ ] Positive family history for VTE                      (3 Points)                  [ ] Prothrombin 43874 A                                     (3 Points)                     [ ] Factor V Leiden                                                (3 Points)                        [ ] Lupus anticoagulants                                      (3 Points)                                                           [ ] Anticardiolipin antibodies                              (3 Points)                                                       [ ] High homocysteine in the blood                   (3 Points)                                             [ ] Other congenital or acquired thrombophilia      (3 Points)                                                [ ] Heparin induced thrombocytopenia                  (3 Points)                                        MOBILITY RELATED FACTORS  [ ] Bed rest                                                         (1 Point)  [ ] Plaster cast                                                    (2 points)  [ ] Bed bound for more than 72 hours           (2 Points)    GENDER SPECIFIC FACTORS  [ ] Pregnancy or had a baby within the last month   (1 Point)  [ ] Post-partum < 6 weeks                                   (1 Point)  [ ] Hormonal therapy  or oral contraception   (1 Point)  [ ] History of pregnancy complications              (1 point)  [ ] Unexplained or recurrent              (1 Point)    OTHER RISK FACTORS                                           (1 Point)  [ ] BMI >40, smoking, diabetes requiring insulin, chemotherapy  blood transfusions and length of surgery over 2 hours    SURGERY RELATED RISK FACTORS  [ ]  Section within the last month     (1 Point)  [ ] Minor surgery                                                  (1 Point)  [ ] Arthroscopic surgery                                       (2 Points)  [] Planned major surgery lasting more            (2 Points)      than 45 minutes     [ 5] Elective hip or knee joint replacement       (5 points)       surgery                                                TRAUMA RELATED RISK FACTORS  [ ] Fracture of the hip, pelvis, or leg                       (5 Points)  [ ] Spinal cord injury resulting in paralysis             (5 points)       (in the previous month)    [ ] Paralysis  (less than 1 month)                             (5 Points)  [ ] Multiple Trauma within 1 month                        (5 Points)    Total Score [  9      ]    Caprini Score 0-2: Low Risk, NO VTE prophylaxis required for most patients, encourage ambulation  Caprini Score 3-6: Moderate Risk , pharmacologic VTE prophylaxis is indicated for most patients (in the absence of contraindications)  Caprini Score Greater than or =7: High risk, pharmocologic VTE prophylaxis indicated for most patients (in the absence of contraindications)

## 2024-09-24 NOTE — H&P PST ADULT - HISTORY OF PRESENT ILLNESS
80 yo male with history of BPH , s/p TURP 2022, had episode of hematuria this spring 2024 s/p Robotic Prostatectomy 9/24 . Pt has bee experiencing chronic right knee pain, which is severe in intensity. He has right knee osteoarthritis. The pain substantially limits activities of daily living. Walking tolerance is reduced. Tylenol helps mildly. He does not take NSAIDs. Cortisone helped x 3 days. The patient denies any radiation of the pain to the feet and it is not associated with numbness, tingling, or weakness. Presents to PST for scheduled  Right Total Knee Arthroplasty with Rey Robot on  10/9/24.  ?     78 yo male with history of  QUINTEN ( noncompliance with CPAP), BPH, s/p TURP 2022, had recent episode of hematuria this spring 2024 s/p Robotic Prostatectomy 9/16/4 ( surgical site healed ) . Pt has bee experiencing chronic right knee pain, which is severe in intensity. He has right knee osteoarthritis. The pain substantially limits activities of daily living. Walking tolerance is reduced. Tylenol helps mildly. He does not take NSAIDs. Cortisone helped x 3 days. The patient denies any radiation of the pain to the feet and it is not associated with numbness, tingling, or weakness. Presents to PST for scheduled  Right Total Knee Arthroplasty with Rey Robot on  10/9/24.  ?

## 2024-09-24 NOTE — H&P PST ADULT - NSICDXPASTSURGICALHX_GEN_ALL_CORE_FT
PAST SURGICAL HISTORY:  History of prostate biopsy     History of prostatectomy     S/P cataract surgery 2011 - bilateral eyes    S/P TURP     S/P ureteral stent placement 9/5/2021

## 2024-09-24 NOTE — H&P PST ADULT - PROBLEM SELECTOR PLAN 1
Right Total Knee Replacement with ZACK on 10/9/24  Pre- Op Instructions discussed   labs sent ,CT knee ordered Right Total Knee Replacement with ZACK on 10/9/24  Pre- Op Instructions discussed   labs sent ,CT knee ordered  medical eval done in 9/24

## 2024-09-24 NOTE — H&P PST ADULT - NSICDXPASTMEDICALHX_GEN_ALL_CORE_FT
PAST MEDICAL HISTORY:  BPH (benign prostatic hyperplasia)     Calculus of ureter right    E coli bacteremia 9/2021 - right nephrolithiasis with right ureteral stent placed, treated with Zosyn in hospital, discharged on Bactrim    Epididymoorchitis 10/2021 - on Cipro 500 mg twice daily    H/O vertigo     QUINTEN (obstructive sleep apnea)      PAST MEDICAL HISTORY:  BPH (benign prostatic hyperplasia)     Calculus of ureter right    E coli bacteremia 9/2021 - right nephrolithiasis with right ureteral stent placed, treated with Zosyn in hospital, discharged on Bactrim    Epididymoorchitis 10/2021 - on Cipro 500 mg twice daily    H/O vertigo     History of TIAs     QUINTEN (obstructive sleep apnea)

## 2024-09-27 ENCOUNTER — APPOINTMENT (OUTPATIENT)
Dept: UROLOGY | Facility: CLINIC | Age: 79
End: 2024-09-27

## 2024-09-27 LAB — SURGICAL PATHOLOGY STUDY: SIGNIFICANT CHANGE UP

## 2024-09-30 ENCOUNTER — NON-APPOINTMENT (OUTPATIENT)
Age: 79
End: 2024-09-30

## 2024-09-30 ENCOUNTER — APPOINTMENT (OUTPATIENT)
Dept: UROLOGY | Facility: CLINIC | Age: 79
End: 2024-09-30
Payer: MEDICARE

## 2024-09-30 VITALS — SYSTOLIC BLOOD PRESSURE: 116 MMHG | DIASTOLIC BLOOD PRESSURE: 76 MMHG

## 2024-09-30 DIAGNOSIS — N40.1 BENIGN PROSTATIC HYPERPLASIA WITH LOWER URINARY TRACT SYMPMS: ICD-10-CM

## 2024-09-30 DIAGNOSIS — N13.8 BENIGN PROSTATIC HYPERPLASIA WITH LOWER URINARY TRACT SYMPMS: ICD-10-CM

## 2024-09-30 DIAGNOSIS — C61 MALIGNANT NEOPLASM OF PROSTATE: ICD-10-CM

## 2024-09-30 PROBLEM — Z86.73 PERSONAL HISTORY OF TRANSIENT ISCHEMIC ATTACK (TIA), AND CEREBRAL INFARCTION WITHOUT RESIDUAL DEFICITS: Chronic | Status: ACTIVE | Noted: 2024-09-24

## 2024-09-30 PROCEDURE — 99024 POSTOP FOLLOW-UP VISIT: CPT

## 2024-09-30 PROCEDURE — 99213 OFFICE O/P EST LOW 20 MIN: CPT | Mod: 24,25

## 2024-09-30 NOTE — HISTORY OF PRESENT ILLNESS
[FreeTextEntry1] : HPI: Mr. Storey is a 76 year old male with hx of BPH, nephrolithiasis presented to the ED with AMS,  fevers and right flank pain. Patient found to have 7mm right distal ureteral  stone on CT scan. Patient emergently taken to the OR 9/5 for cystoscopy, right ureteral stent placement. Urine and blood cultures grew E.Coli on Zosyn IV. Repeat blood cultures no growth to date. Also found to have a 2-3 cm bladder stone. No nausea/vomiting, but has had fever at home to 102 since Saturday but did not want to go to the emergency room.   PVR today 35 cc. Nocturia previously 3-4x. Had prevoiusly seen Dr. Pringle with PSA to 10.8, mpMRI PIRADS1 lesion noted, 145g gland.   Anticoagulation: Previously on ASA All: None Social: lives at home, never smoker, never ETOH PMHx: BPH, nephrolithiasis FHx: gastric cancer PSHx: No prior surgeries Labs: Cr 0.9 9/6, UCx with E. coli, PIRADS1 lesion  Imaging: CT in PACS with 7 mm ureteral stone, 2 cm bladder stone  11/4: Here after admissoin to hospital. He was found to have R epididymoorchitis. WBC up to 16. He was found to have cardiology inferior infarct, seeing cardiology tomorrow. Finished cipro. Echo in hospital WNL, but needed stress test for clearance.   3/1: Here today s/p aquaablation and R URS. Has known L kidney stone on prior CT. US renal with 8 mm R stone. LL reviewed, low volume, hypocitrate <400 and elevated Grady. Recs discussed included increased water intake, lithotlyte 1 packet BID And salt reduction. Also complaining of urinary frequency. PVR today 26 cc.  7/5: Here for follow up for BPH (s/p aquaablation and R URS). 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 LL reviewed with low volume still, citraturia improved to >600. Grady still elevated with UCa elevated; discussed increasing water intake >2.5L and decreasing salt <2g a day. Occasionally has some urgency, overall very happy with voiding symptoms.   10/11: Here today after URS and BPH. ULS today no stones. Prevoid volume 200 cc, and PVR 73 cc. 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 LL with significantly improved volume, UCitrate >600, Grady much improved and has decreased salt intake. Urgency minimal, would like to try cialis for his erections.   4/11: HEre today after ureteroscopy and ULS today without stones.  cc today. Stable ?AML seen. LL with good volume, U citrate 300s but SS indices all wnl. Discussed he needs to continue citrate supplementation with 2 citrus fruits a day and low salt diet. PVR rising. Discussing starting tamsulosin and AE discussed with patient.   4/19: Here today for follow up.  Rt kidney with ULS with 7 mm cortical stone, AML stable 1.3 cm. LL today borderline volume at 1.95L, Ucit 483, discussed that he has run out of K cit. Will restart citrate supplementation and cittus fruits.  Feels he is incompletely emptying, PVR today. 51 cc. Nocturia x 0.   8/26: Here today with BPH now with gland size up to 235g gland. Given that his recurrent hematuria, and his irritative voiding symptoms q 1 hour he would like to consider intervention. PVR previously <100 cc. He states that his urinary symptoms have gotten worse despite having AA previously.  9/30: Urinating well, PVR today 26 cc. Still having significant urinary ferquency, no significant hematuria. Doing well otherwise. We advised he can start a antimuscarinic but might notice some dry mouth or constipation. Pathology with 1% pT1a GG1. Discussed we will follow PSA.  [Urinary Incontinence] : no urinary incontinence [Urinary Retention] : no urinary retention [Urinary Frequency] : urinary frequency

## 2024-09-30 NOTE — PHYSICAL EXAM
[Normal Appearance] : normal appearance [Well Groomed] : well groomed [General Appearance - In No Acute Distress] : no acute distress [Edema] : no peripheral edema [Respiration, Rhythm And Depth] : normal respiratory rhythm and effort [Exaggerated Use Of Accessory Muscles For Inspiration] : no accessory muscle use [Abdomen Soft] : soft [Abdomen Tenderness] : non-tender [Costovertebral Angle Tenderness] : no ~M costovertebral angle tenderness [Urinary Bladder Findings] : the bladder was normal on palpation [Normal Station and Gait] : the gait and station were normal for the patient's age [] : no rash [No Focal Deficits] : no focal deficits [Oriented To Time, Place, And Person] : oriented to person, place, and time [Affect] : the affect was normal [Mood] : the mood was normal [de-identified] : incisions c/d/i, healing well

## 2024-09-30 NOTE — ASSESSMENT
[FreeTextEntry1] : 80 yo M with BPH and significant LUTS  #LUTS - Urinating well s/p RASP - PSA in 3 months - Pathology discussed   #CaP - pT1a, will watch with PSA PSA in 3 months

## 2024-10-08 DIAGNOSIS — Z96.651 PRESENCE OF RIGHT ARTIFICIAL KNEE JOINT: ICD-10-CM

## 2024-10-08 RX ORDER — NAPROXEN 500 MG/1
500 TABLET ORAL
Qty: 60 | Refills: 2 | Status: ACTIVE | COMMUNITY
Start: 2024-10-08 | End: 1900-01-01

## 2024-10-08 RX ORDER — ASPIRIN ENTERIC COATED TABLETS 81 MG 81 MG/1
81 TABLET, DELAYED RELEASE ORAL
Qty: 60 | Refills: 0 | Status: ACTIVE | COMMUNITY
Start: 2024-10-08 | End: 1900-01-01

## 2024-10-08 RX ORDER — OXYCODONE 5 MG/1
5 TABLET ORAL
Qty: 28 | Refills: 0 | Status: ACTIVE | COMMUNITY
Start: 2024-10-08 | End: 1900-01-01

## 2024-10-08 RX ORDER — PANTOPRAZOLE 40 MG/1
40 TABLET, DELAYED RELEASE ORAL DAILY
Qty: 30 | Refills: 0 | Status: ACTIVE | COMMUNITY
Start: 2024-10-08 | End: 1900-01-01

## 2024-10-08 RX ORDER — TRAMADOL HYDROCHLORIDE 50 MG/1
50 TABLET, COATED ORAL
Qty: 28 | Refills: 0 | Status: ACTIVE | COMMUNITY
Start: 2024-10-08 | End: 1900-01-01

## 2024-10-08 NOTE — H&P PST ADULT - PRO ARRIVE FROM
Luis Galindo is a 82 year old female presenting with 3 month follow up  no labs done  Medications reviewed and updated.  Allyssa Alcazar, NP  Denies known Latex allergy or symptoms of Latex sensitivity.    Patient has given a verbal consent for the office visit to be recorded today.       Health Maintenance       DM/CKD Microalbumin Ratio (Yearly)  Never done    DTaP/Tdap/Td Vaccine (2 - Td or Tdap)  Overdue since 11/12/2022    COVID-19 Vaccine (4 - 2023-24 season)  Overdue since 9/1/2024    Influenza Vaccine (1)  Due since 9/1/2024           Following review of the above:  Patient wishes to discuss with clinician: DM/CKD Microalbumin Ratio, COVID-19, Dtap/Tdap/Td, and Influenza    Note: Refer to final orders and clinician documentation.           home

## 2024-10-09 ENCOUNTER — RESULT REVIEW (OUTPATIENT)
Age: 79
End: 2024-10-09

## 2024-10-09 ENCOUNTER — TRANSCRIPTION ENCOUNTER (OUTPATIENT)
Age: 79
End: 2024-10-09

## 2024-10-09 ENCOUNTER — OUTPATIENT (OUTPATIENT)
Dept: OUTPATIENT SERVICES | Facility: HOSPITAL | Age: 79
LOS: 1 days | End: 2024-10-09
Payer: COMMERCIAL

## 2024-10-09 ENCOUNTER — APPOINTMENT (OUTPATIENT)
Dept: ORTHOPEDIC SURGERY | Facility: HOSPITAL | Age: 79
End: 2024-10-09

## 2024-10-09 VITALS
TEMPERATURE: 97 F | DIASTOLIC BLOOD PRESSURE: 81 MMHG | SYSTOLIC BLOOD PRESSURE: 155 MMHG | HEIGHT: 65 IN | OXYGEN SATURATION: 98 % | RESPIRATION RATE: 16 BRPM | HEART RATE: 92 BPM | WEIGHT: 186.95 LBS

## 2024-10-09 DIAGNOSIS — M17.11 UNILATERAL PRIMARY OSTEOARTHRITIS, RIGHT KNEE: ICD-10-CM

## 2024-10-09 DIAGNOSIS — Z96.0 PRESENCE OF UROGENITAL IMPLANTS: Chronic | ICD-10-CM

## 2024-10-09 DIAGNOSIS — Z98.49 CATARACT EXTRACTION STATUS, UNSPECIFIED EYE: Chronic | ICD-10-CM

## 2024-10-09 DIAGNOSIS — Z90.79 ACQUIRED ABSENCE OF OTHER GENITAL ORGAN(S): Chronic | ICD-10-CM

## 2024-10-09 DIAGNOSIS — Z98.890 OTHER SPECIFIED POSTPROCEDURAL STATES: Chronic | ICD-10-CM

## 2024-10-09 PROCEDURE — 73560 X-RAY EXAM OF KNEE 1 OR 2: CPT | Mod: 26,RT

## 2024-10-09 PROCEDURE — 27447 TOTAL KNEE ARTHROPLASTY: CPT | Mod: RT

## 2024-10-09 PROCEDURE — 20985 CPTR-ASST DIR MS PX: CPT

## 2024-10-09 DEVICE — INSERT TIB BEARING CS X3 SZ 5 11MM: Type: IMPLANTABLE DEVICE | Site: RIGHT | Status: FUNCTIONAL

## 2024-10-09 DEVICE — COMP FEM CRUCIATE RETAINING: Type: IMPLANTABLE DEVICE | Site: RIGHT | Status: FUNCTIONAL

## 2024-10-09 DEVICE — TIBIAL COMPONENT: Type: IMPLANTABLE DEVICE | Site: RIGHT | Status: FUNCTIONAL

## 2024-10-09 DEVICE — MAKO BONE PIN 4MM X 110MM: Type: IMPLANTABLE DEVICE | Site: RIGHT | Status: FUNCTIONAL

## 2024-10-09 DEVICE — MAKO BONE PIN 4MM X 140MM: Type: IMPLANTABLE DEVICE | Site: RIGHT | Status: FUNCTIONAL

## 2024-10-09 RX ORDER — LIDOCAINE HCL 20 MG/ML
0.2 AMPUL (ML) INJECTION ONCE
Refills: 0 | Status: COMPLETED | OUTPATIENT
Start: 2024-10-09 | End: 2024-10-09

## 2024-10-09 RX ORDER — SODIUM CHLORIDE 0.9 % (FLUSH) 0.9 %
500 SYRINGE (ML) INJECTION ONCE
Refills: 0 | Status: COMPLETED | OUTPATIENT
Start: 2024-10-09 | End: 2024-10-09

## 2024-10-09 RX ORDER — TRAMADOL HYDROCHLORIDE 50 MG/1
50 TABLET, COATED ORAL EVERY 6 HOURS
Refills: 0 | Status: DISCONTINUED | OUTPATIENT
Start: 2024-10-09 | End: 2024-10-09

## 2024-10-09 RX ORDER — PANTOPRAZOLE SODIUM 40 MG/1
40 TABLET, DELAYED RELEASE ORAL ONCE
Refills: 0 | Status: COMPLETED | OUTPATIENT
Start: 2024-10-09 | End: 2024-10-09

## 2024-10-09 RX ORDER — ASPIRIN 325 MG
81 TABLET ORAL ONCE
Refills: 0 | Status: COMPLETED | OUTPATIENT
Start: 2024-10-10 | End: 2024-10-10

## 2024-10-09 RX ORDER — TAMSULOSIN HCL 0.4 MG
0.4 CAPSULE ORAL AT BEDTIME
Refills: 0 | Status: ACTIVE | OUTPATIENT
Start: 2024-10-09 | End: 2025-09-07

## 2024-10-09 RX ORDER — ACETAMINOPHEN 325 MG
1000 TABLET ORAL ONCE
Refills: 0 | Status: COMPLETED | OUTPATIENT
Start: 2024-10-10 | End: 2024-10-10

## 2024-10-09 RX ORDER — OXYCODONE HYDROCHLORIDE 30 MG/1
5 TABLET, FILM COATED, EXTENDED RELEASE ORAL EVERY 4 HOURS
Refills: 0 | Status: DISCONTINUED | OUTPATIENT
Start: 2024-10-09 | End: 2024-10-09

## 2024-10-09 RX ORDER — OXYCODONE HYDROCHLORIDE 30 MG/1
1 TABLET, FILM COATED, EXTENDED RELEASE ORAL
Qty: 0 | Refills: 0 | DISCHARGE

## 2024-10-09 RX ORDER — PANTOPRAZOLE SODIUM 40 MG/1
1 TABLET, DELAYED RELEASE ORAL
Qty: 0 | Refills: 0 | DISCHARGE

## 2024-10-09 RX ORDER — ACETAMINOPHEN 325 MG
1000 TABLET ORAL ONCE
Refills: 0 | Status: COMPLETED | OUTPATIENT
Start: 2024-10-10 | End: 2024-10-09

## 2024-10-09 RX ORDER — PANTOPRAZOLE SODIUM 40 MG/1
40 TABLET, DELAYED RELEASE ORAL
Refills: 0 | Status: ACTIVE | OUTPATIENT
Start: 2024-10-09 | End: 2025-09-07

## 2024-10-09 RX ORDER — KETOROLAC TROMETHAMINE 10 MG/1
15 TABLET, FILM COATED ORAL EVERY 8 HOURS
Refills: 0 | Status: COMPLETED | OUTPATIENT
Start: 2024-10-09 | End: 2024-10-10

## 2024-10-09 RX ORDER — ONDANSETRON HCL/PF 4 MG/2 ML
4 VIAL (ML) INJECTION EVERY 6 HOURS
Refills: 0 | Status: ACTIVE | OUTPATIENT
Start: 2024-10-09 | End: 2025-09-07

## 2024-10-09 RX ORDER — SENNOSIDES 8.6 MG
2 TABLET ORAL
Qty: 0 | Refills: 0 | DISCHARGE

## 2024-10-09 RX ORDER — TRAMADOL HYDROCHLORIDE 50 MG/1
1 TABLET, COATED ORAL
Qty: 0 | Refills: 0 | DISCHARGE

## 2024-10-09 RX ORDER — SODIUM CHLORIDE IRRIG SOLUTION 0.9 %
1000 SOLUTION, IRRIGATION IRRIGATION
Refills: 0 | Status: ACTIVE | OUTPATIENT
Start: 2024-10-09 | End: 2025-09-07

## 2024-10-09 RX ORDER — CHLORHEXIDINE GLUCONATE ORAL RINSE 1.2 MG/ML
1 SOLUTION DENTAL ONCE
Refills: 0 | Status: COMPLETED | OUTPATIENT
Start: 2024-10-09 | End: 2024-10-09

## 2024-10-09 RX ORDER — TRAMADOL HYDROCHLORIDE 50 MG/1
50 TABLET, COATED ORAL ONCE
Refills: 0 | Status: DISCONTINUED | OUTPATIENT
Start: 2024-10-09 | End: 2024-10-09

## 2024-10-09 RX ORDER — ASPIRIN 325 MG
1 TABLET ORAL
Qty: 0 | Refills: 0 | DISCHARGE

## 2024-10-09 RX ORDER — FINASTERIDE 5 MG/1
5 TABLET, FILM COATED ORAL DAILY
Refills: 0 | Status: ACTIVE | OUTPATIENT
Start: 2024-10-09 | End: 2025-09-07

## 2024-10-09 RX ORDER — ACETAMINOPHEN 325 MG
3 TABLET ORAL
Qty: 0 | Refills: 0 | DISCHARGE

## 2024-10-09 RX ORDER — SODIUM CHLORIDE 0.9 % (FLUSH) 0.9 %
3 SYRINGE (ML) INJECTION EVERY 8 HOURS
Refills: 0 | Status: ACTIVE | OUTPATIENT
Start: 2024-10-09 | End: 2025-09-07

## 2024-10-09 RX ORDER — CEFAZOLIN SODIUM 1 G
2000 VIAL (EA) INJECTION ONCE
Refills: 0 | Status: COMPLETED | OUTPATIENT
Start: 2024-10-09 | End: 2024-10-09

## 2024-10-09 RX ORDER — SODIUM CHLORIDE 0.9 % (FLUSH) 0.9 %
500 SYRINGE (ML) INJECTION ONCE
Refills: 0 | Status: COMPLETED | OUTPATIENT
Start: 2024-10-10 | End: 2024-10-10

## 2024-10-09 RX ORDER — CEFAZOLIN SODIUM 1 G
2000 VIAL (EA) INJECTION EVERY 8 HOURS
Refills: 0 | Status: COMPLETED | OUTPATIENT
Start: 2024-10-09 | End: 2024-10-10

## 2024-10-09 RX ORDER — MAGNESIUM HYDROXIDE 400 MG/5ML
30 SUSPENSION, ORAL (FINAL DOSE FORM) ORAL DAILY
Refills: 0 | Status: ACTIVE | OUTPATIENT
Start: 2024-10-09 | End: 2025-09-07

## 2024-10-09 RX ORDER — SENNOSIDES 8.6 MG
2 TABLET ORAL AT BEDTIME
Refills: 0 | Status: ACTIVE | OUTPATIENT
Start: 2024-10-09 | End: 2025-09-07

## 2024-10-09 RX ORDER — OXYCODONE HYDROCHLORIDE 30 MG/1
10 TABLET, FILM COATED, EXTENDED RELEASE ORAL EVERY 4 HOURS
Refills: 0 | Status: DISCONTINUED | OUTPATIENT
Start: 2024-10-09 | End: 2024-10-09

## 2024-10-09 RX ADMIN — Medication 400 MILLIGRAM(S): at 22:07

## 2024-10-09 RX ADMIN — Medication 3 MILLILITER(S): at 21:58

## 2024-10-09 RX ADMIN — Medication 100 MILLILITER(S): at 13:41

## 2024-10-09 RX ADMIN — Medication 666.67 MILLILITER(S): at 17:45

## 2024-10-09 RX ADMIN — Medication 1000 MILLIGRAM(S): at 22:40

## 2024-10-09 RX ADMIN — Medication 3 MILLILITER(S): at 12:51

## 2024-10-09 RX ADMIN — KETOROLAC TROMETHAMINE 15 MILLIGRAM(S): 10 TABLET, FILM COATED ORAL at 23:14

## 2024-10-09 RX ADMIN — CHLORHEXIDINE GLUCONATE ORAL RINSE 1 APPLICATION(S): 1.2 SOLUTION DENTAL at 13:40

## 2024-10-09 RX ADMIN — FINASTERIDE 5 MILLIGRAM(S): 5 TABLET, FILM COATED ORAL at 21:10

## 2024-10-09 RX ADMIN — PANTOPRAZOLE SODIUM 40 MILLIGRAM(S): 40 TABLET, DELAYED RELEASE ORAL at 13:41

## 2024-10-09 RX ADMIN — Medication 0.4 MILLIGRAM(S): at 21:10

## 2024-10-09 RX ADMIN — Medication 100 MILLIGRAM(S): at 23:09

## 2024-10-09 RX ADMIN — Medication 666.67 MILLILITER(S): at 21:45

## 2024-10-09 RX ADMIN — TRAMADOL HYDROCHLORIDE 50 MILLIGRAM(S): 50 TABLET, COATED ORAL at 13:41

## 2024-10-09 NOTE — BRIEF OPERATIVE NOTE - SECOND ASSIST NAME
Addended by: YULIET PLUNKETT on: 12/19/2019 12:54 PM     Modules accepted: Orders    
Rylan Greco (Physician Assistant)

## 2024-10-09 NOTE — OCCUPATIONAL THERAPY INITIAL EVALUATION ADULT - ADDITIONAL COMMENTS
pt reports lives in private home with spouse, 6 steps to enter, 6stairs inside, walk-in shower. Prior to admission Ind with ADLs/ambulating, no AD/DME. Owns RW.

## 2024-10-09 NOTE — CHART NOTE - NSCHARTNOTEFT_GEN_A_CORE
Resting without complaints.  No Chest Pain, SOB, N/V.    T(C): 36.5 (10-09-24 @ 17:40), Max: 36.5 (10-09-24 @ 17:40)  HR: 91 (10-09-24 @ 19:21) (78 - 92)  BP: 145/65 (10-09-24 @ 19:21) (119/59 - 155/81)  RR: 16 (10-09-24 @ 18:10) (10 - 23)  SpO2: 98% (10-09-24 @ 19:21) (97% - 98%)      Exam:  Alert and [place], No Acute Distress  Card: +S1/S2, RRR  Pulm: CTAB  Laterality: R Knee  Aquacel c/d/i  Calves soft, non-tender bilaterally  +PF/DF/EHL/FHL  SILT  + DP pulse    Xray: S/P R Total Knee Arthroplasty, prosthesis intact with no loren-prosthetic Fx.             A/P: 79yMale S/p  L/R Total Hip/Knee Arthroplasty. VSS. NAD  -PT/OT-WBAT With Posterior/Anterior Hip Precautions/ Knee Immobilizer  -IS  -DVT PPx: ASA 81mg PO BID, SCDs   -Pain Control  -Continue Current Tx  -Dispo planning to home    Jerrell Caban PA-C  Orthopedic Surgery Team  Team Pager #[phone] Resting without complaints.  No Chest Pain, SOB, N/V.    T(C): 36.5 (10-09-24 @ 17:40), Max: 36.5 (10-09-24 @ 17:40)  HR: 91 (10-09-24 @ 19:21) (78 - 92)  BP: 145/65 (10-09-24 @ 19:21) (119/59 - 155/81)  RR: 16 (10-09-24 @ 18:10) (10 - 23)  SpO2: 98% (10-09-24 @ 19:21) (97% - 98%)      Exam:  Alert and [place], No Acute Distress  Card: +S1/S2, RRR  Pulm: CTAB  Laterality: R Knee  Aquacel c/d/i  Calves soft, non-tender bilaterally  +PF/DF/EHL/FHL  SILT  + DP pulse    Xray: S/P R Total Knee Arthroplasty, prosthesis intact with no loren-prosthetic Fx.             A/P: 79y Male S/p R Total Knee Arthroplasty. VSS. NAD  -PT/OT-WBAT RLE, PT cleared for home at this time.  -FU AM labs tomorrow  -IS  -DVT PPx: ASA 81mg PO BID, SCDs   -Pain Control  -Continue Current Tx: Monitoring/Observation in recovery room overnight.  -Dispo plan to home tomorrow AM    BRAYAN FitzpatrickC  Orthopedic Surgery Team  Team Pager #[phone]

## 2024-10-09 NOTE — OCCUPATIONAL THERAPY INITIAL EVALUATION ADULT - PERTINENT HX OF CURRENT PROBLEM, REHAB EVAL
80 yo male with history of  QUINTEN ( noncompliance with CPAP), BPH, s/p TURP 2022, had recent episode of hematuria this spring 2024 s/p Robotic Prostatectomy 9/16/4 ( surgical site healed ) . Pt has bee experiencing chronic right knee pain, which is severe in intensity. He has right knee osteoarthritis. The pain substantially limits activities of daily living. Walking tolerance is reduced. Tylenol helps mildly. He does not take NSAIDs. Cortisone helped x 3 days. The patient denies any radiation of the pain to the feet and it is not associated with numbness, tingling, or weakness. Presents to PST for scheduled  Right Total Knee Arthroplasty with Rey Robot on  10/9/24. 80 yo male with history of  QUINTEN ( noncompliance with CPAP), BPH, s/p TURP 2022, had recent episode of hematuria this spring 2024 s/p Robotic Prostatectomy 9/16/4 ( surgical site healed ) . Pt has bee experiencing chronic right knee pain, which is severe in intensity. He has right knee osteoarthritis. The pain substantially limits activities of daily living. Walking tolerance is reduced. Tylenol helps mildly. He does not take NSAIDs. Cortisone helped x 3 days. The patient denies any radiation of the pain to the feet and it is not associated with numbness, tingling, or weakness. Presents to PST for scheduled  Right Total Knee Arthroplasty with Rey Robot on  10/9/24. now s/p R TKA 10/9

## 2024-10-09 NOTE — ASU DISCHARGE PLAN (ADULT/PEDIATRIC) - ASU DC SPECIAL INSTRUCTIONSFT
Please follow up with Dr. Paris at your scheduled follow up appointment in 2 weeks (Call office to confirm appointment).  PT-weight bearing as tolerated.  Aspirin 81mg twice daily x 4 weeks total for dvt prevention.  Keep dressing clean, dry and intact until date listed on dressing.  Have doctor remove any sutures (if applicable) at follow up visit.      Please follow up with your PMD within 1 month for routine checkup.

## 2024-10-09 NOTE — PHYSICAL THERAPY INITIAL EVALUATION ADULT - PERTINENT HX OF CURRENT PROBLEM, REHAB EVAL
79 y.o. M PMH QUINTEN (noncompliance with CPAP), BPH, s/p TURP 2022, had recent episode of hematuria this spring 2024 s/p Robotic Prostatectomy 9/16/4 ( surgical site healed ) . Pt has bee experiencing chronic right knee pain, which is severe in intensity. He has right knee osteoarthritis. The pain substantially limits activities of daily living. Walking tolerance is reduced. Tylenol helps mildly. He does not take NSAIDs. Cortisone helped x 3 days. The patient denies any radiation of the pain to the feet and it is not associated with numbness, tingling, or weakness. Now s/p R total knee replacement on 10/9/24.

## 2024-10-09 NOTE — ASU PATIENT PROFILE, ADULT - FALL HARM RISK - UNIVERSAL INTERVENTIONS
Bed in lowest position, wheels locked, appropriate side rails in place/Call bell, personal items and telephone in reach/Instruct patient to call for assistance before getting out of bed or chair/Non-slip footwear when patient is out of bed/[place] to call system/Physically safe environment - no spills, clutter or unnecessary equipment/Purposeful Proactive Rounding/Room/bathroom lighting operational, light cord in reach

## 2024-10-09 NOTE — ASU DISCHARGE PLAN (ADULT/PEDIATRIC) - NS MD DC FALL RISK RISK
For information on Fall & Injury Prevention, visit: [email or web address] OR  [email or web address] OR  https://www.cdc.gov/steadi/patient.html

## 2024-10-09 NOTE — PHYSICAL THERAPY INITIAL EVALUATION ADULT - ADDITIONAL COMMENTS
Pt resides in a pvt home w/ spouse, 6 steps to enter, 7 additional steps inside. PTA pt was independent with all mobility & ADL's. Did not use an AD for ambulation, owns RW.

## 2024-10-09 NOTE — ASU PATIENT PROFILE, ADULT - NSICDXPASTMEDICALHX_GEN_ALL_CORE_FT
PAST MEDICAL HISTORY:  BPH (benign prostatic hyperplasia)     Calculus of ureter right    E coli bacteremia 9/2021 - right nephrolithiasis with right ureteral stent placed, treated with Zosyn in hospital, discharged on Bactrim    Epididymoorchitis 10/2021 - on Cipro 500 mg twice daily    H/O vertigo     History of TIAs     QUINTEN (obstructive sleep apnea)

## 2024-10-10 ENCOUNTER — NON-APPOINTMENT (OUTPATIENT)
Age: 79
End: 2024-10-10

## 2024-10-10 VITALS
HEART RATE: 90 BPM | SYSTOLIC BLOOD PRESSURE: 142 MMHG | TEMPERATURE: 98 F | OXYGEN SATURATION: 98 % | RESPIRATION RATE: 16 BRPM | DIASTOLIC BLOOD PRESSURE: 79 MMHG

## 2024-10-10 PROCEDURE — 82962 GLUCOSE BLOOD TEST: CPT

## 2024-10-10 PROCEDURE — 97530 THERAPEUTIC ACTIVITIES: CPT

## 2024-10-10 PROCEDURE — S2900: CPT

## 2024-10-10 PROCEDURE — C1776: CPT

## 2024-10-10 PROCEDURE — C1713: CPT

## 2024-10-10 PROCEDURE — 73560 X-RAY EXAM OF KNEE 1 OR 2: CPT

## 2024-10-10 PROCEDURE — 97165 OT EVAL LOW COMPLEX 30 MIN: CPT

## 2024-10-10 PROCEDURE — 20985 CPTR-ASST DIR MS PX: CPT

## 2024-10-10 PROCEDURE — 97116 GAIT TRAINING THERAPY: CPT

## 2024-10-10 PROCEDURE — 97161 PT EVAL LOW COMPLEX 20 MIN: CPT

## 2024-10-10 PROCEDURE — 27447 TOTAL KNEE ARTHROPLASTY: CPT | Mod: RT

## 2024-10-10 RX ADMIN — Medication 101.6 MILLIGRAM(S): at 05:02

## 2024-10-10 RX ADMIN — Medication 1000 MILLIGRAM(S): at 06:30

## 2024-10-10 RX ADMIN — Medication 666.67 MILLILITER(S): at 05:05

## 2024-10-10 RX ADMIN — Medication 81 MILLIGRAM(S): at 05:58

## 2024-10-10 RX ADMIN — KETOROLAC TROMETHAMINE 15 MILLIGRAM(S): 10 TABLET, FILM COATED ORAL at 06:00

## 2024-10-10 RX ADMIN — PANTOPRAZOLE SODIUM 40 MILLIGRAM(S): 40 TABLET, DELAYED RELEASE ORAL at 05:59

## 2024-10-10 RX ADMIN — KETOROLAC TROMETHAMINE 15 MILLIGRAM(S): 10 TABLET, FILM COATED ORAL at 05:02

## 2024-10-10 RX ADMIN — Medication 3 MILLILITER(S): at 05:24

## 2024-10-10 RX ADMIN — Medication 400 MILLIGRAM(S): at 05:58

## 2024-10-10 RX ADMIN — Medication 100 MILLIGRAM(S): at 07:23

## 2024-10-10 NOTE — PROGRESS NOTE ADULT - ASSESSMENT
A/p: 79y Male POD#1 s/p R Total Knee Arthroplasty.  VSS. NAD.    PT/OT-WBAT RLE.  PT cleared for safe home disposition at this time.  IS  DVT PPx: ASA 81mg PO BID x 1 month  Pain Control  Continue Current Tx.  Disposition to home once transportation gets here.    Jerrell Caban PA-C  Orthopedic Surgery Team  Team Pager: [phone]

## 2024-10-10 NOTE — PROGRESS NOTE ADULT - SUBJECTIVE AND OBJECTIVE BOX
Post op Day [1]    Patient resting without complaints.  No chest pain, SOB, N/V.    T(C): 36.6 (10-10-24 @ 06:00), Max: 36.6 (10-10-24 @ 06:00)  HR: 90 (10-10-24 @ 06:00) (67 - 92)  BP: 142/79 (10-10-24 @ 06:00) (118/56 - 155/81)  RR: 16 (10-10-24 @ 06:00) (10 - 23)  SpO2: 98% (10-10-24 @ 06:00) (97% - 98%)      Exam:  Alert and Oriented, No Acute Distress  Cardiac: Normal S1 & S2, RRR, No murmurs, rubs or gallops appreciated.  Pulmonary: 18bpm, normal breathing effort, no retractions, diminished lung sounds appreciated.  Bronchial/Vesicular lungs sounds appreciated throughout all lung lobes.  Lower Extremities: R Knee  Dressing: C/D/I w/ Aquacel  Calves Soft, Non-tender bilaterally  +PF/DF/EHL/FHL  SILT  +DP and PT Pulse doppler signal appreciated 2+

## 2024-10-16 ENCOUNTER — NON-APPOINTMENT (OUTPATIENT)
Age: 79
End: 2024-10-16

## 2024-10-25 ENCOUNTER — APPOINTMENT (OUTPATIENT)
Dept: ORTHOPEDIC SURGERY | Facility: CLINIC | Age: 79
End: 2024-10-25
Payer: MEDICARE

## 2024-10-25 VITALS — BODY MASS INDEX: 29.73 KG/M2 | WEIGHT: 185 LBS | HEIGHT: 66 IN

## 2024-10-25 DIAGNOSIS — Z01.818 ENCOUNTER FOR OTHER PREPROCEDURAL EXAMINATION: ICD-10-CM

## 2024-10-25 DIAGNOSIS — Z96.651 PRESENCE OF RIGHT ARTIFICIAL KNEE JOINT: ICD-10-CM

## 2024-10-25 DIAGNOSIS — M17.11 UNILATERAL PRIMARY OSTEOARTHRITIS, RIGHT KNEE: ICD-10-CM

## 2024-10-25 PROCEDURE — 73564 X-RAY EXAM KNEE 4 OR MORE: CPT | Mod: RT

## 2024-10-25 PROCEDURE — 99024 POSTOP FOLLOW-UP VISIT: CPT

## 2024-11-22 ENCOUNTER — APPOINTMENT (OUTPATIENT)
Dept: ORTHOPEDIC SURGERY | Facility: CLINIC | Age: 79
End: 2024-11-22
Payer: MEDICARE

## 2024-11-22 VITALS — HEIGHT: 66 IN | WEIGHT: 185 LBS | BODY MASS INDEX: 29.73 KG/M2

## 2024-11-22 DIAGNOSIS — Z96.651 PRESENCE OF RIGHT ARTIFICIAL KNEE JOINT: ICD-10-CM

## 2024-11-22 PROCEDURE — 99024 POSTOP FOLLOW-UP VISIT: CPT

## 2024-11-22 RX ORDER — TRAMADOL HYDROCHLORIDE 50 MG/1
50 TABLET, COATED ORAL
Qty: 30 | Refills: 0 | Status: ACTIVE | COMMUNITY
Start: 2024-11-22 | End: 1900-01-01

## 2024-12-30 ENCOUNTER — APPOINTMENT (OUTPATIENT)
Dept: UROLOGY | Facility: CLINIC | Age: 79
End: 2024-12-30
Payer: MEDICARE

## 2024-12-30 VITALS — HEART RATE: 106 BPM | DIASTOLIC BLOOD PRESSURE: 83 MMHG | SYSTOLIC BLOOD PRESSURE: 160 MMHG

## 2024-12-30 DIAGNOSIS — N40.1 BENIGN PROSTATIC HYPERPLASIA WITH LOWER URINARY TRACT SYMPMS: ICD-10-CM

## 2024-12-30 DIAGNOSIS — C61 MALIGNANT NEOPLASM OF PROSTATE: ICD-10-CM

## 2024-12-30 DIAGNOSIS — N13.8 BENIGN PROSTATIC HYPERPLASIA WITH LOWER URINARY TRACT SYMPMS: ICD-10-CM

## 2024-12-30 PROCEDURE — 99213 OFFICE O/P EST LOW 20 MIN: CPT

## 2024-12-31 LAB — PSA SERPL-MCNC: 0.47 NG/ML

## 2025-01-01 ENCOUNTER — RX RENEWAL (OUTPATIENT)
Age: 80
End: 2025-01-01

## 2025-02-13 ENCOUNTER — INPATIENT (INPATIENT)
Facility: HOSPITAL | Age: 80
LOS: 1 days | Discharge: HOME | DRG: 322 | End: 2025-02-15
Attending: INTERNAL MEDICINE | Admitting: STUDENT IN AN ORGANIZED HEALTH CARE EDUCATION/TRAINING PROGRAM
Payer: COMMERCIAL

## 2025-02-13 VITALS
OXYGEN SATURATION: 99 % | HEIGHT: 66 IN | WEIGHT: 182.1 LBS | HEART RATE: 89 BPM | DIASTOLIC BLOOD PRESSURE: 96 MMHG | RESPIRATION RATE: 16 BRPM | TEMPERATURE: 98 F | SYSTOLIC BLOOD PRESSURE: 162 MMHG

## 2025-02-13 DIAGNOSIS — Z98.49 CATARACT EXTRACTION STATUS, UNSPECIFIED EYE: Chronic | ICD-10-CM

## 2025-02-13 DIAGNOSIS — Z98.890 OTHER SPECIFIED POSTPROCEDURAL STATES: Chronic | ICD-10-CM

## 2025-02-13 DIAGNOSIS — I21.4 NON-ST ELEVATION (NSTEMI) MYOCARDIAL INFARCTION: ICD-10-CM

## 2025-02-13 DIAGNOSIS — Z90.79 ACQUIRED ABSENCE OF OTHER GENITAL ORGAN(S): Chronic | ICD-10-CM

## 2025-02-13 DIAGNOSIS — Z96.0 PRESENCE OF UROGENITAL IMPLANTS: Chronic | ICD-10-CM

## 2025-02-13 LAB
ALBUMIN SERPL ELPH-MCNC: 4.5 G/DL — SIGNIFICANT CHANGE UP (ref 3.3–5)
ALP SERPL-CCNC: 95 U/L — SIGNIFICANT CHANGE UP (ref 40–120)
ALT FLD-CCNC: 42 U/L — SIGNIFICANT CHANGE UP (ref 10–45)
ANION GAP SERPL CALC-SCNC: 14 MMOL/L — SIGNIFICANT CHANGE UP (ref 5–17)
APTT BLD: 31.8 SEC — SIGNIFICANT CHANGE UP (ref 24.5–35.6)
AST SERPL-CCNC: 88 U/L — HIGH (ref 10–40)
BASOPHILS # BLD AUTO: 0.04 K/UL — SIGNIFICANT CHANGE UP (ref 0–0.2)
BASOPHILS NFR BLD AUTO: 0.5 % — SIGNIFICANT CHANGE UP (ref 0–2)
BILIRUB SERPL-MCNC: 0.3 MG/DL — SIGNIFICANT CHANGE UP (ref 0.2–1.2)
BUN SERPL-MCNC: 12 MG/DL — SIGNIFICANT CHANGE UP (ref 7–23)
CALCIUM SERPL-MCNC: 10 MG/DL — SIGNIFICANT CHANGE UP (ref 8.4–10.5)
CHLORIDE SERPL-SCNC: 102 MMOL/L — SIGNIFICANT CHANGE UP (ref 96–108)
CO2 SERPL-SCNC: 22 MMOL/L — SIGNIFICANT CHANGE UP (ref 22–31)
CREAT SERPL-MCNC: 1.09 MG/DL — SIGNIFICANT CHANGE UP (ref 0.5–1.3)
EGFR: 68 ML/MIN/1.73M2 — SIGNIFICANT CHANGE UP
EGFR: 68 ML/MIN/1.73M2 — SIGNIFICANT CHANGE UP
EOSINOPHIL # BLD AUTO: 0.13 K/UL — SIGNIFICANT CHANGE UP (ref 0–0.5)
EOSINOPHIL NFR BLD AUTO: 1.5 % — SIGNIFICANT CHANGE UP (ref 0–6)
GLUCOSE SERPL-MCNC: 112 MG/DL — HIGH (ref 70–99)
HCT VFR BLD CALC: 49.1 % — SIGNIFICANT CHANGE UP (ref 39–50)
HGB BLD-MCNC: 15.3 G/DL — SIGNIFICANT CHANGE UP (ref 13–17)
IMM GRANULOCYTES NFR BLD AUTO: 0.6 % — SIGNIFICANT CHANGE UP (ref 0–0.9)
INR BLD: 1.04 RATIO — SIGNIFICANT CHANGE UP (ref 0.85–1.16)
LYMPHOCYTES # BLD AUTO: 1.49 K/UL — SIGNIFICANT CHANGE UP (ref 1–3.3)
LYMPHOCYTES # BLD AUTO: 16.8 % — SIGNIFICANT CHANGE UP (ref 13–44)
MCHC RBC-ENTMCNC: 26.4 PG — LOW (ref 27–34)
MCHC RBC-ENTMCNC: 31.2 G/DL — LOW (ref 32–36)
MCV RBC AUTO: 84.7 FL — SIGNIFICANT CHANGE UP (ref 80–100)
MONOCYTES # BLD AUTO: 0.92 K/UL — HIGH (ref 0–0.9)
MONOCYTES NFR BLD AUTO: 10.4 % — SIGNIFICANT CHANGE UP (ref 2–14)
NEUTROPHILS # BLD AUTO: 6.24 K/UL — SIGNIFICANT CHANGE UP (ref 1.8–7.4)
NEUTROPHILS NFR BLD AUTO: 70.2 % — SIGNIFICANT CHANGE UP (ref 43–77)
NRBC BLD AUTO-RTO: 0 /100 WBCS — SIGNIFICANT CHANGE UP (ref 0–0)
PLATELET # BLD AUTO: 305 K/UL — SIGNIFICANT CHANGE UP (ref 150–400)
POTASSIUM SERPL-MCNC: 4.4 MMOL/L — SIGNIFICANT CHANGE UP (ref 3.5–5.3)
POTASSIUM SERPL-SCNC: 4.4 MMOL/L — SIGNIFICANT CHANGE UP (ref 3.5–5.3)
PROT SERPL-MCNC: 8.3 G/DL — SIGNIFICANT CHANGE UP (ref 6–8.3)
PROTHROM AB SERPL-ACNC: 11.9 SEC — SIGNIFICANT CHANGE UP (ref 9.9–13.4)
RBC # BLD: 5.8 M/UL — SIGNIFICANT CHANGE UP (ref 4.2–5.8)
RBC # FLD: 14.3 % — SIGNIFICANT CHANGE UP (ref 10.3–14.5)
SODIUM SERPL-SCNC: 138 MMOL/L — SIGNIFICANT CHANGE UP (ref 135–145)
TROPONIN T, HIGH SENSITIVITY RESULT: 1207 NG/L — HIGH (ref 0–51)
TROPONIN T, HIGH SENSITIVITY RESULT: 1264 NG/L — HIGH (ref 0–51)
WBC # BLD: 8.87 K/UL — SIGNIFICANT CHANGE UP (ref 3.8–10.5)
WBC # FLD AUTO: 8.87 K/UL — SIGNIFICANT CHANGE UP (ref 3.8–10.5)

## 2025-02-13 PROCEDURE — 71046 X-RAY EXAM CHEST 2 VIEWS: CPT | Mod: 26

## 2025-02-13 PROCEDURE — 99285 EMERGENCY DEPT VISIT HI MDM: CPT

## 2025-02-13 RX ORDER — ASPIRIN 325 MG
162 TABLET ORAL ONCE
Refills: 0 | Status: COMPLETED | OUTPATIENT
Start: 2025-02-13 | End: 2025-02-13

## 2025-02-13 RX ORDER — TICAGRELOR 90 MG/1
180 TABLET ORAL ONCE
Refills: 0 | Status: COMPLETED | OUTPATIENT
Start: 2025-02-13 | End: 2025-02-13

## 2025-02-13 RX ORDER — HEPARIN SODIUM 1000 [USP'U]/ML
INJECTION INTRAVENOUS; SUBCUTANEOUS
Qty: 25000 | Refills: 0 | Status: DISCONTINUED | OUTPATIENT
Start: 2025-02-13 | End: 2025-02-14

## 2025-02-13 RX ORDER — HEPARIN SODIUM 1000 [USP'U]/ML
4000 INJECTION INTRAVENOUS; SUBCUTANEOUS EVERY 6 HOURS
Refills: 0 | Status: DISCONTINUED | OUTPATIENT
Start: 2025-02-13 | End: 2025-02-15

## 2025-02-13 RX ORDER — HEPARIN SODIUM 1000 [USP'U]/ML
4000 INJECTION INTRAVENOUS; SUBCUTANEOUS ONCE
Refills: 0 | Status: COMPLETED | OUTPATIENT
Start: 2025-02-13 | End: 2025-02-13

## 2025-02-13 RX ADMIN — HEPARIN SODIUM 4000 UNIT(S): 1000 INJECTION INTRAVENOUS; SUBCUTANEOUS at 21:55

## 2025-02-13 RX ADMIN — TICAGRELOR 180 MILLIGRAM(S): 90 TABLET ORAL at 19:51

## 2025-02-13 RX ADMIN — HEPARIN SODIUM 1000 UNIT(S)/HR: 1000 INJECTION INTRAVENOUS; SUBCUTANEOUS at 21:56

## 2025-02-13 RX ADMIN — Medication 162 MILLIGRAM(S): at 19:13

## 2025-02-14 ENCOUNTER — TRANSCRIPTION ENCOUNTER (OUTPATIENT)
Age: 80
End: 2025-02-14

## 2025-02-14 ENCOUNTER — RESULT REVIEW (OUTPATIENT)
Age: 80
End: 2025-02-14

## 2025-02-14 DIAGNOSIS — I21.4 NON-ST ELEVATION (NSTEMI) MYOCARDIAL INFARCTION: ICD-10-CM

## 2025-02-14 DIAGNOSIS — Z29.9 ENCOUNTER FOR PROPHYLACTIC MEASURES, UNSPECIFIED: ICD-10-CM

## 2025-02-14 DIAGNOSIS — R09.89 OTHER SPECIFIED SYMPTOMS AND SIGNS INVOLVING THE CIRCULATORY AND RESPIRATORY SYSTEMS: ICD-10-CM

## 2025-02-14 DIAGNOSIS — N40.0 BENIGN PROSTATIC HYPERPLASIA WITHOUT LOWER URINARY TRACT SYMPTOMS: ICD-10-CM

## 2025-02-14 LAB
A1C WITH ESTIMATED AVERAGE GLUCOSE RESULT: 6.5 % — HIGH (ref 4–5.6)
ALBUMIN SERPL ELPH-MCNC: 4 G/DL — SIGNIFICANT CHANGE UP (ref 3.3–5)
ALP SERPL-CCNC: 84 U/L — SIGNIFICANT CHANGE UP (ref 40–120)
ALT FLD-CCNC: 33 U/L — SIGNIFICANT CHANGE UP (ref 10–45)
ANION GAP SERPL CALC-SCNC: 14 MMOL/L — SIGNIFICANT CHANGE UP (ref 5–17)
APTT BLD: 80.5 SEC — HIGH (ref 24.5–35.6)
AST SERPL-CCNC: 67 U/L — HIGH (ref 10–40)
BASOPHILS # BLD AUTO: 0.03 K/UL — SIGNIFICANT CHANGE UP (ref 0–0.2)
BASOPHILS NFR BLD AUTO: 0.4 % — SIGNIFICANT CHANGE UP (ref 0–2)
BILIRUB SERPL-MCNC: 0.7 MG/DL — SIGNIFICANT CHANGE UP (ref 0.2–1.2)
BUN SERPL-MCNC: 10 MG/DL — SIGNIFICANT CHANGE UP (ref 7–23)
CALCIUM SERPL-MCNC: 9.8 MG/DL — SIGNIFICANT CHANGE UP (ref 8.4–10.5)
CHLORIDE SERPL-SCNC: 104 MMOL/L — SIGNIFICANT CHANGE UP (ref 96–108)
CHOLEST SERPL-MCNC: 201 MG/DL — HIGH
CK MB BLD-MCNC: 6 % — HIGH (ref 0–3.5)
CK MB CFR SERPL CALC: 22.7 NG/ML — HIGH (ref 0–6.7)
CK SERPL-CCNC: 379 U/L — HIGH (ref 30–200)
CO2 SERPL-SCNC: 21 MMOL/L — LOW (ref 22–31)
CREAT SERPL-MCNC: 0.79 MG/DL — SIGNIFICANT CHANGE UP (ref 0.5–1.3)
EGFR: 89 ML/MIN/1.73M2 — SIGNIFICANT CHANGE UP
EGFR: 89 ML/MIN/1.73M2 — SIGNIFICANT CHANGE UP
EOSINOPHIL # BLD AUTO: 0.24 K/UL — SIGNIFICANT CHANGE UP (ref 0–0.5)
EOSINOPHIL NFR BLD AUTO: 3.1 % — SIGNIFICANT CHANGE UP (ref 0–6)
ESTIMATED AVERAGE GLUCOSE: 140 MG/DL — HIGH (ref 68–114)
GLUCOSE SERPL-MCNC: 105 MG/DL — HIGH (ref 70–99)
HCT VFR BLD CALC: 46 % — SIGNIFICANT CHANGE UP (ref 39–50)
HDLC SERPL-MCNC: 35 MG/DL — LOW
HGB BLD-MCNC: 14.8 G/DL — SIGNIFICANT CHANGE UP (ref 13–17)
IMM GRANULOCYTES NFR BLD AUTO: 0.4 % — SIGNIFICANT CHANGE UP (ref 0–0.9)
LDLC SERPL-MCNC: 148 MG/DL — HIGH
LIPID PNL WITH DIRECT LDL SERPL: 148 MG/DL — HIGH
LYMPHOCYTES # BLD AUTO: 1.45 K/UL — SIGNIFICANT CHANGE UP (ref 1–3.3)
LYMPHOCYTES # BLD AUTO: 18.6 % — SIGNIFICANT CHANGE UP (ref 13–44)
MAGNESIUM SERPL-MCNC: 2 MG/DL — SIGNIFICANT CHANGE UP (ref 1.6–2.6)
MCHC RBC-ENTMCNC: 26.6 PG — LOW (ref 27–34)
MCHC RBC-ENTMCNC: 32.2 G/DL — SIGNIFICANT CHANGE UP (ref 32–36)
MCV RBC AUTO: 82.7 FL — SIGNIFICANT CHANGE UP (ref 80–100)
MONOCYTES # BLD AUTO: 0.88 K/UL — SIGNIFICANT CHANGE UP (ref 0–0.9)
MONOCYTES NFR BLD AUTO: 11.3 % — SIGNIFICANT CHANGE UP (ref 2–14)
NEUTROPHILS # BLD AUTO: 5.17 K/UL — SIGNIFICANT CHANGE UP (ref 1.8–7.4)
NEUTROPHILS NFR BLD AUTO: 66.2 % — SIGNIFICANT CHANGE UP (ref 43–77)
NONHDLC SERPL-MCNC: 166 MG/DL — HIGH
NRBC BLD AUTO-RTO: 0 /100 WBCS — SIGNIFICANT CHANGE UP (ref 0–0)
PLATELET # BLD AUTO: 286 K/UL — SIGNIFICANT CHANGE UP (ref 150–400)
POTASSIUM SERPL-MCNC: 4.1 MMOL/L — SIGNIFICANT CHANGE UP (ref 3.5–5.3)
POTASSIUM SERPL-SCNC: 4.1 MMOL/L — SIGNIFICANT CHANGE UP (ref 3.5–5.3)
PROT SERPL-MCNC: 7.4 G/DL — SIGNIFICANT CHANGE UP (ref 6–8.3)
RBC # BLD: 5.56 M/UL — SIGNIFICANT CHANGE UP (ref 4.2–5.8)
RBC # FLD: 14.1 % — SIGNIFICANT CHANGE UP (ref 10.3–14.5)
SODIUM SERPL-SCNC: 139 MMOL/L — SIGNIFICANT CHANGE UP (ref 135–145)
TRIGL SERPL-MCNC: 99 MG/DL — SIGNIFICANT CHANGE UP
TROPONIN T, HIGH SENSITIVITY RESULT: 1430 NG/L — HIGH (ref 0–51)
WBC # BLD: 7.8 K/UL — SIGNIFICANT CHANGE UP (ref 3.8–10.5)
WBC # FLD AUTO: 7.8 K/UL — SIGNIFICANT CHANGE UP (ref 3.8–10.5)

## 2025-02-14 PROCEDURE — 99152 MOD SED SAME PHYS/QHP 5/>YRS: CPT

## 2025-02-14 PROCEDURE — 93454 CORONARY ARTERY ANGIO S&I: CPT | Mod: 26,59

## 2025-02-14 PROCEDURE — 93356 MYOCRD STRAIN IMG SPCKL TRCK: CPT

## 2025-02-14 PROCEDURE — 92978 ENDOLUMINL IVUS OCT C 1ST: CPT | Mod: 26,RC

## 2025-02-14 PROCEDURE — 99223 1ST HOSP IP/OBS HIGH 75: CPT

## 2025-02-14 PROCEDURE — 93010 ELECTROCARDIOGRAM REPORT: CPT

## 2025-02-14 PROCEDURE — 93306 TTE W/DOPPLER COMPLETE: CPT | Mod: 26

## 2025-02-14 PROCEDURE — 92928 PRQ TCAT PLMT NTRAC ST 1 LES: CPT | Mod: RC

## 2025-02-14 RX ORDER — ACETAMINOPHEN 500 MG/5ML
650 LIQUID (ML) ORAL EVERY 6 HOURS
Refills: 0 | Status: DISCONTINUED | OUTPATIENT
Start: 2025-02-14 | End: 2025-02-15

## 2025-02-14 RX ORDER — MELATONIN 5 MG
3 TABLET ORAL AT BEDTIME
Refills: 0 | Status: DISCONTINUED | OUTPATIENT
Start: 2025-02-14 | End: 2025-02-15

## 2025-02-14 RX ORDER — ATORVASTATIN CALCIUM 80 MG/1
80 TABLET, FILM COATED ORAL AT BEDTIME
Refills: 0 | Status: DISCONTINUED | OUTPATIENT
Start: 2025-02-14 | End: 2025-02-15

## 2025-02-14 RX ORDER — TICAGRELOR 90 MG/1
1 TABLET ORAL
Qty: 60 | Refills: 0
Start: 2025-02-14 | End: 2025-03-15

## 2025-02-14 RX ORDER — METOPROLOL SUCCINATE 50 MG/1
25 TABLET, EXTENDED RELEASE ORAL THREE TIMES A DAY
Refills: 0 | Status: DISCONTINUED | OUTPATIENT
Start: 2025-02-14 | End: 2025-02-15

## 2025-02-14 RX ORDER — ASPIRIN 325 MG
81 TABLET ORAL DAILY
Refills: 0 | Status: DISCONTINUED | OUTPATIENT
Start: 2025-02-14 | End: 2025-02-15

## 2025-02-14 RX ORDER — TICAGRELOR 90 MG/1
90 TABLET ORAL EVERY 12 HOURS
Refills: 0 | Status: DISCONTINUED | OUTPATIENT
Start: 2025-02-14 | End: 2025-02-15

## 2025-02-14 RX ADMIN — METOPROLOL SUCCINATE 25 MILLIGRAM(S): 50 TABLET, EXTENDED RELEASE ORAL at 21:01

## 2025-02-14 RX ADMIN — TICAGRELOR 90 MILLIGRAM(S): 90 TABLET ORAL at 06:09

## 2025-02-14 RX ADMIN — ATORVASTATIN CALCIUM 80 MILLIGRAM(S): 80 TABLET, FILM COATED ORAL at 21:01

## 2025-02-14 RX ADMIN — HEPARIN SODIUM 900 UNIT(S)/HR: 1000 INJECTION INTRAVENOUS; SUBCUTANEOUS at 08:03

## 2025-02-14 RX ADMIN — Medication 81 MILLIGRAM(S): at 13:13

## 2025-02-14 RX ADMIN — HEPARIN SODIUM 1000 UNIT(S)/HR: 1000 INJECTION INTRAVENOUS; SUBCUTANEOUS at 02:15

## 2025-02-14 RX ADMIN — Medication 750 MILLILITER(S): at 13:28

## 2025-02-14 RX ADMIN — TICAGRELOR 90 MILLIGRAM(S): 90 TABLET ORAL at 18:08

## 2025-02-14 RX ADMIN — Medication 75 MILLILITER(S): at 13:28

## 2025-02-15 ENCOUNTER — TRANSCRIPTION ENCOUNTER (OUTPATIENT)
Age: 80
End: 2025-02-15

## 2025-02-15 VITALS
HEART RATE: 78 BPM | RESPIRATION RATE: 18 BRPM | OXYGEN SATURATION: 99 % | SYSTOLIC BLOOD PRESSURE: 121 MMHG | DIASTOLIC BLOOD PRESSURE: 76 MMHG | TEMPERATURE: 98 F

## 2025-02-15 LAB
HCT VFR BLD CALC: 44.3 % — SIGNIFICANT CHANGE UP (ref 39–50)
HGB BLD-MCNC: 14.1 G/DL — SIGNIFICANT CHANGE UP (ref 13–17)
MCHC RBC-ENTMCNC: 26.8 PG — LOW (ref 27–34)
MCHC RBC-ENTMCNC: 31.8 G/DL — LOW (ref 32–36)
MCV RBC AUTO: 84.2 FL — SIGNIFICANT CHANGE UP (ref 80–100)
NRBC BLD AUTO-RTO: 0 /100 WBCS — SIGNIFICANT CHANGE UP (ref 0–0)
PLATELET # BLD AUTO: 289 K/UL — SIGNIFICANT CHANGE UP (ref 150–400)
RBC # BLD: 5.26 M/UL — SIGNIFICANT CHANGE UP (ref 4.2–5.8)
RBC # FLD: 14.4 % — SIGNIFICANT CHANGE UP (ref 10.3–14.5)
TROPONIN T, HIGH SENSITIVITY RESULT: 1090 NG/L — HIGH (ref 0–51)
WBC # BLD: 8.12 K/UL — SIGNIFICANT CHANGE UP (ref 3.8–10.5)
WBC # FLD AUTO: 8.12 K/UL — SIGNIFICANT CHANGE UP (ref 3.8–10.5)

## 2025-02-15 PROCEDURE — 99285 EMERGENCY DEPT VISIT HI MDM: CPT

## 2025-02-15 PROCEDURE — 96374 THER/PROPH/DIAG INJ IV PUSH: CPT

## 2025-02-15 PROCEDURE — C1725: CPT

## 2025-02-15 PROCEDURE — C1874: CPT

## 2025-02-15 PROCEDURE — 83735 ASSAY OF MAGNESIUM: CPT

## 2025-02-15 PROCEDURE — C1887: CPT

## 2025-02-15 PROCEDURE — 85027 COMPLETE CBC AUTOMATED: CPT

## 2025-02-15 PROCEDURE — 93005 ELECTROCARDIOGRAM TRACING: CPT

## 2025-02-15 PROCEDURE — 85025 COMPLETE CBC W/AUTO DIFF WBC: CPT

## 2025-02-15 PROCEDURE — 85730 THROMBOPLASTIN TIME PARTIAL: CPT

## 2025-02-15 PROCEDURE — 80061 LIPID PANEL: CPT

## 2025-02-15 PROCEDURE — 80053 COMPREHEN METABOLIC PANEL: CPT

## 2025-02-15 PROCEDURE — 82553 CREATINE MB FRACTION: CPT

## 2025-02-15 PROCEDURE — 36415 COLL VENOUS BLD VENIPUNCTURE: CPT

## 2025-02-15 PROCEDURE — 83036 HEMOGLOBIN GLYCOSYLATED A1C: CPT

## 2025-02-15 PROCEDURE — C9600: CPT | Mod: RC

## 2025-02-15 PROCEDURE — C1894: CPT

## 2025-02-15 PROCEDURE — 92978 ENDOLUMINL IVUS OCT C 1ST: CPT | Mod: RC

## 2025-02-15 PROCEDURE — 99231 SBSQ HOSP IP/OBS SF/LOW 25: CPT

## 2025-02-15 PROCEDURE — 93306 TTE W/DOPPLER COMPLETE: CPT

## 2025-02-15 PROCEDURE — C1769: CPT

## 2025-02-15 PROCEDURE — C1753: CPT

## 2025-02-15 PROCEDURE — 85610 PROTHROMBIN TIME: CPT

## 2025-02-15 PROCEDURE — 93454 CORONARY ARTERY ANGIO S&I: CPT | Mod: 59

## 2025-02-15 PROCEDURE — 93356 MYOCRD STRAIN IMG SPCKL TRCK: CPT

## 2025-02-15 PROCEDURE — 84484 ASSAY OF TROPONIN QUANT: CPT

## 2025-02-15 PROCEDURE — 71046 X-RAY EXAM CHEST 2 VIEWS: CPT

## 2025-02-15 PROCEDURE — 82550 ASSAY OF CK (CPK): CPT

## 2025-02-15 RX ORDER — ATORVASTATIN CALCIUM 80 MG/1
1 TABLET, FILM COATED ORAL
Qty: 30 | Refills: 0
Start: 2025-02-15 | End: 2025-03-16

## 2025-02-15 RX ORDER — ASPIRIN 325 MG
1 TABLET ORAL
Qty: 30 | Refills: 0
Start: 2025-02-15 | End: 2025-03-16

## 2025-02-15 RX ORDER — METOPROLOL SUCCINATE 50 MG/1
50 TABLET, EXTENDED RELEASE ORAL
Refills: 0 | Status: DISCONTINUED | OUTPATIENT
Start: 2025-02-15 | End: 2025-02-15

## 2025-02-15 RX ORDER — METOPROLOL SUCCINATE 50 MG/1
1 TABLET, EXTENDED RELEASE ORAL
Qty: 60 | Refills: 0
Start: 2025-02-15 | End: 2025-03-16

## 2025-02-15 RX ADMIN — TICAGRELOR 90 MILLIGRAM(S): 90 TABLET ORAL at 05:01

## 2025-02-15 RX ADMIN — METOPROLOL SUCCINATE 25 MILLIGRAM(S): 50 TABLET, EXTENDED RELEASE ORAL at 05:01

## 2025-02-15 RX ADMIN — Medication 81 MILLIGRAM(S): at 13:39

## 2025-02-21 ENCOUNTER — OUTPATIENT (OUTPATIENT)
Dept: OUTPATIENT SERVICES | Facility: HOSPITAL | Age: 80
LOS: 1 days | End: 2025-02-21

## 2025-02-21 ENCOUNTER — APPOINTMENT (OUTPATIENT)
Dept: UROLOGY | Facility: CLINIC | Age: 80
End: 2025-02-21
Payer: MEDICARE

## 2025-02-21 VITALS
SYSTOLIC BLOOD PRESSURE: 121 MMHG | DIASTOLIC BLOOD PRESSURE: 73 MMHG | HEIGHT: 65 IN | WEIGHT: 189 LBS | HEART RATE: 73 BPM | TEMPERATURE: 98.2 F | BODY MASS INDEX: 31.49 KG/M2 | RESPIRATION RATE: 16 BRPM | OXYGEN SATURATION: 99 %

## 2025-02-21 DIAGNOSIS — N20.0 CALCULUS OF KIDNEY: ICD-10-CM

## 2025-02-21 DIAGNOSIS — Z96.0 PRESENCE OF UROGENITAL IMPLANTS: Chronic | ICD-10-CM

## 2025-02-21 DIAGNOSIS — Z98.890 OTHER SPECIFIED POSTPROCEDURAL STATES: Chronic | ICD-10-CM

## 2025-02-21 DIAGNOSIS — Z90.79 ACQUIRED ABSENCE OF OTHER GENITAL ORGAN(S): Chronic | ICD-10-CM

## 2025-02-21 DIAGNOSIS — C61 MALIGNANT NEOPLASM OF PROSTATE: ICD-10-CM

## 2025-02-21 DIAGNOSIS — Z98.49 CATARACT EXTRACTION STATUS, UNSPECIFIED EYE: Chronic | ICD-10-CM

## 2025-02-21 DIAGNOSIS — R35.0 FREQUENCY OF MICTURITION: ICD-10-CM

## 2025-02-21 PROCEDURE — 76775 US EXAM ABDO BACK WALL LIM: CPT

## 2025-02-21 PROCEDURE — 99214 OFFICE O/P EST MOD 30 MIN: CPT

## 2025-04-08 ENCOUNTER — APPOINTMENT (OUTPATIENT)
Dept: ORTHOPEDIC SURGERY | Facility: CLINIC | Age: 80
End: 2025-04-08
Payer: MEDICARE

## 2025-04-08 VITALS — HEIGHT: 65 IN | BODY MASS INDEX: 31.49 KG/M2 | WEIGHT: 189 LBS

## 2025-04-08 DIAGNOSIS — Z96.651 PRESENCE OF RIGHT ARTIFICIAL KNEE JOINT: ICD-10-CM

## 2025-04-08 PROCEDURE — 99213 OFFICE O/P EST LOW 20 MIN: CPT | Mod: 25

## 2025-04-08 PROCEDURE — 73564 X-RAY EXAM KNEE 4 OR MORE: CPT | Mod: RT

## 2025-04-30 ENCOUNTER — APPOINTMENT (OUTPATIENT)
Dept: UROLOGY | Facility: CLINIC | Age: 80
End: 2025-04-30

## 2025-05-28 ENCOUNTER — NON-APPOINTMENT (OUTPATIENT)
Age: 80
End: 2025-05-28

## 2025-05-28 ENCOUNTER — APPOINTMENT (OUTPATIENT)
Dept: UROLOGY | Facility: CLINIC | Age: 80
End: 2025-05-28
Payer: MEDICARE

## 2025-05-28 VITALS
HEIGHT: 65 IN | RESPIRATION RATE: 17 BRPM | HEART RATE: 80 BPM | TEMPERATURE: 98.1 F | WEIGHT: 189 LBS | DIASTOLIC BLOOD PRESSURE: 68 MMHG | SYSTOLIC BLOOD PRESSURE: 114 MMHG | BODY MASS INDEX: 31.49 KG/M2

## 2025-05-28 DIAGNOSIS — R97.20 ELEVATED PROSTATE, SPECIFIC ANTIGEN [PSA]: ICD-10-CM

## 2025-05-28 DIAGNOSIS — N40.1 BENIGN PROSTATIC HYPERPLASIA WITH LOWER URINARY TRACT SYMPMS: ICD-10-CM

## 2025-05-28 DIAGNOSIS — N13.8 BENIGN PROSTATIC HYPERPLASIA WITH LOWER URINARY TRACT SYMPMS: ICD-10-CM

## 2025-05-28 PROCEDURE — 99214 OFFICE O/P EST MOD 30 MIN: CPT

## 2025-05-28 PROCEDURE — G2211 COMPLEX E/M VISIT ADD ON: CPT

## 2025-05-30 ENCOUNTER — OUTPATIENT (OUTPATIENT)
Dept: OUTPATIENT SERVICES | Facility: HOSPITAL | Age: 80
LOS: 1 days | End: 2025-05-30
Payer: COMMERCIAL

## 2025-05-30 ENCOUNTER — APPOINTMENT (OUTPATIENT)
Dept: CT IMAGING | Facility: IMAGING CENTER | Age: 80
End: 2025-05-30
Payer: MEDICARE

## 2025-05-30 DIAGNOSIS — Z98.49 CATARACT EXTRACTION STATUS, UNSPECIFIED EYE: Chronic | ICD-10-CM

## 2025-05-30 DIAGNOSIS — Z96.0 PRESENCE OF UROGENITAL IMPLANTS: Chronic | ICD-10-CM

## 2025-05-30 DIAGNOSIS — Z90.79 ACQUIRED ABSENCE OF OTHER GENITAL ORGAN(S): Chronic | ICD-10-CM

## 2025-05-30 DIAGNOSIS — Z98.890 OTHER SPECIFIED POSTPROCEDURAL STATES: Chronic | ICD-10-CM

## 2025-05-30 DIAGNOSIS — N20.0 CALCULUS OF KIDNEY: ICD-10-CM

## 2025-05-30 PROCEDURE — 74176 CT ABD & PELVIS W/O CONTRAST: CPT

## 2025-05-30 PROCEDURE — 74176 CT ABD & PELVIS W/O CONTRAST: CPT | Mod: 26

## 2025-06-04 ENCOUNTER — APPOINTMENT (OUTPATIENT)
Dept: UROLOGY | Facility: CLINIC | Age: 80
End: 2025-06-04
Payer: MEDICARE

## 2025-06-04 DIAGNOSIS — N20.0 CALCULUS OF KIDNEY: ICD-10-CM

## 2025-06-04 DIAGNOSIS — C61 MALIGNANT NEOPLASM OF PROSTATE: ICD-10-CM

## 2025-06-04 PROCEDURE — 99213 OFFICE O/P EST LOW 20 MIN: CPT | Mod: 93

## 2025-06-14 ENCOUNTER — OUTPATIENT (OUTPATIENT)
Dept: OUTPATIENT SERVICES | Facility: HOSPITAL | Age: 80
LOS: 1 days | End: 2025-06-14
Payer: COMMERCIAL

## 2025-06-14 ENCOUNTER — APPOINTMENT (OUTPATIENT)
Dept: MRI IMAGING | Facility: IMAGING CENTER | Age: 80
End: 2025-06-14

## 2025-06-14 DIAGNOSIS — C61 MALIGNANT NEOPLASM OF PROSTATE: ICD-10-CM

## 2025-06-14 DIAGNOSIS — Z96.0 PRESENCE OF UROGENITAL IMPLANTS: Chronic | ICD-10-CM

## 2025-06-14 DIAGNOSIS — Z98.890 OTHER SPECIFIED POSTPROCEDURAL STATES: Chronic | ICD-10-CM

## 2025-06-14 DIAGNOSIS — Z90.79 ACQUIRED ABSENCE OF OTHER GENITAL ORGAN(S): Chronic | ICD-10-CM

## 2025-06-14 DIAGNOSIS — Z98.49 CATARACT EXTRACTION STATUS, UNSPECIFIED EYE: Chronic | ICD-10-CM

## 2025-06-14 PROCEDURE — 76498P: CUSTOM | Mod: 26

## 2025-06-14 PROCEDURE — 76498 UNLISTED MR PROCEDURE: CPT

## 2025-06-14 PROCEDURE — 72197 MRI PELVIS W/O & W/DYE: CPT

## 2025-06-14 PROCEDURE — A9585: CPT

## 2025-06-14 PROCEDURE — 72197 MRI PELVIS W/O & W/DYE: CPT | Mod: 26

## 2025-06-25 ENCOUNTER — RESULT REVIEW (OUTPATIENT)
Age: 80
End: 2025-06-25

## 2025-06-25 ENCOUNTER — OUTPATIENT (OUTPATIENT)
Dept: OUTPATIENT SERVICES | Facility: HOSPITAL | Age: 80
LOS: 1 days | End: 2025-06-25
Payer: COMMERCIAL

## 2025-06-25 DIAGNOSIS — Z96.0 PRESENCE OF UROGENITAL IMPLANTS: Chronic | ICD-10-CM

## 2025-06-25 DIAGNOSIS — R97.20 ELEVATED PROSTATE SPECIFIC ANTIGEN [PSA]: ICD-10-CM

## 2025-06-25 DIAGNOSIS — Z90.79 ACQUIRED ABSENCE OF OTHER GENITAL ORGAN(S): Chronic | ICD-10-CM

## 2025-06-25 DIAGNOSIS — Z98.890 OTHER SPECIFIED POSTPROCEDURAL STATES: Chronic | ICD-10-CM

## 2025-06-25 DIAGNOSIS — Z98.49 CATARACT EXTRACTION STATUS, UNSPECIFIED EYE: Chronic | ICD-10-CM

## 2025-06-25 PROCEDURE — C8001: CPT

## 2025-07-09 NOTE — H&P PST ADULT - LYMPHATIC
PAST SURGICAL HISTORY:  No significant past surgical history     
posterior cervical L/posterior cervical R/anterior cervical L/anterior cervical R/supraclavicular L/supraclavicular R

## (undated) DEVICE — SOL IRR POUR H2O 1000ML

## (undated) DEVICE — SYR LUER LOK 3CC

## (undated) DEVICE — TUBING STRYKEFLOW II SUCTION / IRRIGATOR

## (undated) DEVICE — SOL IRR POUR H2O 250ML

## (undated) DEVICE — DRAIN CHANNEL 19FR ROUND FULL FLUTED

## (undated) DEVICE — DRAPE TOWEL BLUE 17" X 24"

## (undated) DEVICE — SOL IRR BAG NS 0.9% 3000ML

## (undated) DEVICE — FOLEY CATH 2-WAY 18FR 30CC LATEX HYDROGEL

## (undated) DEVICE — SUT CAPROSYN 4-0 30" P-12 UNDYED

## (undated) DEVICE — SOL IRR POUR NS 0.9% 500ML

## (undated) DEVICE — VENODYNE/SCD SLEEVE CALF LARGE

## (undated) DEVICE — XI OBTURATOR OPTICAL BLADELESS 8MM

## (undated) DEVICE — FOLEY CATH 2-WAY 16FR 5CC LATEX COUDE RED

## (undated) DEVICE — TUBING TUR 2 PRONG

## (undated) DEVICE — DRAPE SURGICAL #1010

## (undated) DEVICE — SPECIMEN CONTAINER 100ML

## (undated) DEVICE — DRSG CURITY GAUZE SPONGE 4 X 4" 12-PLY

## (undated) DEVICE — PREP CHLORAPREP HI-LITE ORANGE 26ML

## (undated) DEVICE — LUBRICANT INST ELECTROLUBE Z SOLUTION

## (undated) DEVICE — SUT QUILL MONODERM 0 1/2 CIRCLE TAPR 45CM 26MM

## (undated) DEVICE — NDL HYPO SAFE 22G X 1.5" (BLACK)

## (undated) DEVICE — SUT VICRYL 0 18" TIES

## (undated) DEVICE — HOOD FLYTE STRYKER SURGICOOL W PEELAWAY

## (undated) DEVICE — SHEARS COVIDIEN ENDO SHEAR 5MM X 31CM W UNIPOLAR CAUTERY

## (undated) DEVICE — DRAPE BACK TABLE COVER HEAVY DUTY 60"

## (undated) DEVICE — DRAIN RESERVOIR FOR JACKSON PRATT 100CC CARDINAL

## (undated) DEVICE — WARMING BLANKET UPPER ADULT

## (undated) DEVICE — PACK MIS KNEE (1 PIECE)

## (undated) DEVICE — SYR LUER LOK 20CC

## (undated) DEVICE — DRSG DERMABOND 0.7ML

## (undated) DEVICE — DRAPE U (CLEAR) 47 X 51"

## (undated) DEVICE — PACK ADVANCED LAPAROSCOPIC NS

## (undated) DEVICE — VENODYNE/SCD SLEEVE CALF MEDIUM

## (undated) DEVICE — DRAPE INSTRUMENT POUCH 6.75" X 11"

## (undated) DEVICE — SYR LUER LOK 5CC

## (undated) DEVICE — XI ARM FORCEP FENESTRATED BIPOLAR 8MM

## (undated) DEVICE — XI ARM CLIP APPLIER LARGE

## (undated) DEVICE — MAKO DRAPE KIT

## (undated) DEVICE — DRAPE 3/4 SHEET 52X76"

## (undated) DEVICE — POSITIONER FOAM HEADREST (PINK)

## (undated) DEVICE — IRRIGATION TRAY W PISTON SYRINGE 60ML

## (undated) DEVICE — SUT VLOC 90 2-0 6" GS-22 VIOLET

## (undated) DEVICE — XI SEAL UNIV 5- 8 MM

## (undated) DEVICE — SYR LUER LOK 50CC

## (undated) DEVICE — DRAPE LIGHT HANDLE COVER (GREEN)

## (undated) DEVICE — FOLEY CATH 3-WAY 22FR 30CC SILICONE LUBRISIL

## (undated) DEVICE — SOL IRR BAG H2O 3000ML

## (undated) DEVICE — SUT PDO 2 1/2 CIRCLE 40MM NDL 45CM

## (undated) DEVICE — SYR LUER LOK 10CC

## (undated) DEVICE — DRSG AQUACEL 3.5 X 12"

## (undated) DEVICE — SUT VICRYL 1 27" CPX UNDYED

## (undated) DEVICE — STAPLER SKIN VISI-STAT 35 WIDE

## (undated) DEVICE — XI ARM FORCEP PROGRASP 8MM

## (undated) DEVICE — POSITIONER FOAM EGG CRATE ULNAR 2PCS (PINK)

## (undated) DEVICE — SUT SOFSILK 2-0 18" C-23

## (undated) DEVICE — POSITIONER CARDIAC BUMP

## (undated) DEVICE — DRSG STERISTRIPS 0.5 X 4"

## (undated) DEVICE — DRAPE MAYO STAND 30"

## (undated) DEVICE — GLV 8.5 PROTEXIS (WHITE)

## (undated) DEVICE — POSITIONER PINK PAD PIGAZZI SYSTEM

## (undated) DEVICE — MEDICATION LABELS W MARKER

## (undated) DEVICE — XI DRAPE COLUMN

## (undated) DEVICE — MAKO CHECKPOINT KIT FEMORAL / TIBIAL

## (undated) DEVICE — D HELP - CLEARVIEW CLEARIFY SYSTEM

## (undated) DEVICE — GLV 7.5 PROTEXIS (WHITE)

## (undated) DEVICE — PREP BETADINE KIT

## (undated) DEVICE — SYR IRRIGATION PISTON 60CC

## (undated) DEVICE — XI ARM NEEDLE DRIVER LARGE

## (undated) DEVICE — TUBING AIRSEAL TRI-LUMEN FILTERED

## (undated) DEVICE — MARKING PEN W RULER

## (undated) DEVICE — SOL INJ NS 0.9% 100ML

## (undated) DEVICE — XI TIP COVER

## (undated) DEVICE — LUBRICATING JELLY ONESHOT 1.25OZ

## (undated) DEVICE — ELCTR GROUNDING PAD ADULT COVIDIEN

## (undated) DEVICE — SOL BETADINE POUCH 0.75OZ STERILE

## (undated) DEVICE — XI ARM SCISSOR MONO CURVED

## (undated) DEVICE — SUT QUILL MONODERM 2-0 3/8 CIRCLE 45CM

## (undated) DEVICE — NDL HYPO SAFE 18G X 1.5" (PINK)

## (undated) DEVICE — XI VESSEL SEALER

## (undated) DEVICE — POSITIONER PURPLE ARM ONE STEP (LARGE)

## (undated) DEVICE — MAKO VIZADISC KNEE TRACKING KIT

## (undated) DEVICE — XI DRAPE ARM

## (undated) DEVICE — ELCTR BOVIE PENCIL SMOKE EVACUATION

## (undated) DEVICE — GOWN TRIMAX LG

## (undated) DEVICE — SUT VLOC 90 2-0 9" GS-21 VIOLET

## (undated) DEVICE — MAKO BLADE STANDARD

## (undated) DEVICE — HOOD FLYTE STRYKER HELMET SHIELD

## (undated) DEVICE — XI ARM FORCEP TENACULUM

## (undated) DEVICE — INSUFFLATION NDL COVIDIEN STEP 14G FOR STEP/VERSASTEP

## (undated) DEVICE — BAG URINE W METER 2L

## (undated) DEVICE — DRSG MASTISOL

## (undated) DEVICE — TOURNIQUET CUFF 34" DUAL PORT W PLC

## (undated) DEVICE — SUT POLYSORB 0 36" GU-46

## (undated) DEVICE — SOL IRR POUR NS 0.9% 1000ML

## (undated) DEVICE — ENDOCATCH 10MM SPECIMEN POUCH

## (undated) DEVICE — DRAPE 3/4 SHEET W REINFORCEMENT 56X77"

## (undated) DEVICE — SYR LUER LOK 30CC

## (undated) DEVICE — SAW BLADE STRYKER SAGITTAL EXTRA WIDE THIN SHORT

## (undated) DEVICE — SUT STRATAFIX SYMMETRIC PDS PLUS 1 18" CTX VIOLET

## (undated) DEVICE — TROCAR SURGIQUEST AIRSEAL 8MMX100MM

## (undated) DEVICE — DRSG TEGADERM 2.5X3"

## (undated) DEVICE — STRYKER INTERPULSE HANDPIECE W IRR SUCTION TUBE

## (undated) DEVICE — DRSG WEBRIL 6"

## (undated) DEVICE — DRAPE 1/2 SHEET 40X57"

## (undated) DEVICE — SAW BLADE STRYKER SAGITTAL 81.5X12.5X1.19MM

## (undated) DEVICE — BAG DECANTER 2

## (undated) DEVICE — DRAIN JACKSON PRATT 10MM FLAT FULL NO TROCAR

## (undated) DEVICE — GLV 8.5 PROTEXIS (BLUE)

## (undated) DEVICE — SUT VLOC 90 3-0 6" CV-23 UNDYED